# Patient Record
Sex: FEMALE | URBAN - METROPOLITAN AREA
[De-identification: names, ages, dates, MRNs, and addresses within clinical notes are randomized per-mention and may not be internally consistent; named-entity substitution may affect disease eponyms.]

---

## 2021-06-01 ENCOUNTER — LAB REQUISITION (OUTPATIENT)
Dept: LAB | Age: 86
End: 2021-06-01

## 2021-06-01 DIAGNOSIS — L08.0 PYODERMA: ICD-10-CM

## 2021-06-01 PROCEDURE — 87077 CULTURE AEROBIC IDENTIFY: CPT | Performed by: CLINICAL MEDICAL LABORATORY

## 2021-06-01 PROCEDURE — 87205 SMEAR GRAM STAIN: CPT | Performed by: CLINICAL MEDICAL LABORATORY

## 2021-06-01 PROCEDURE — 87070 CULTURE OTHR SPECIMN AEROBIC: CPT | Performed by: CLINICAL MEDICAL LABORATORY

## 2021-06-01 PROCEDURE — 87186 SC STD MICRODIL/AGAR DIL: CPT | Performed by: CLINICAL MEDICAL LABORATORY

## 2021-06-04 LAB
BACTERIA SPEC AEROBE CULT: ABNORMAL
GRAM STN SPEC: ABNORMAL

## 2022-05-22 ENCOUNTER — HOSPITAL ENCOUNTER (EMERGENCY)
Dept: CT IMAGING | Age: 87
Discharge: HOME | End: 2022-05-25
Payer: COMMERCIAL

## 2022-05-22 ENCOUNTER — HOSPITAL ENCOUNTER (EMERGENCY)
Age: 87
Discharge: HOME | End: 2022-05-22
Attending: STUDENT IN AN ORGANIZED HEALTH CARE EDUCATION/TRAINING PROGRAM
Payer: COMMERCIAL

## 2022-05-22 VITALS
DIASTOLIC BLOOD PRESSURE: 90 MMHG | TEMPERATURE: 98.4 F | SYSTOLIC BLOOD PRESSURE: 162 MMHG | RESPIRATION RATE: 16 BRPM | WEIGHT: 126 LBS | HEART RATE: 91 BPM | BODY MASS INDEX: 23.19 KG/M2 | HEIGHT: 62 IN | OXYGEN SATURATION: 99 %

## 2022-05-22 DIAGNOSIS — K52.9 COLITIS: Primary | ICD-10-CM

## 2022-05-22 DIAGNOSIS — N39.0 UTI (URINARY TRACT INFECTION), UNCOMPLICATED: ICD-10-CM

## 2022-05-22 LAB
ALBUMIN SERPL-MCNC: 4 G/DL (ref 3.2–4.6)
ALBUMIN/GLOB SERPL: 1.3 {RATIO} (ref 1.2–3.5)
ALP SERPL-CCNC: 77 U/L (ref 50–130)
ALT SERPL-CCNC: 21 U/L (ref 12–65)
ANION GAP SERPL CALC-SCNC: 9 MMOL/L (ref 7–16)
APPEARANCE UR: CLEAR
AST SERPL-CCNC: 24 U/L (ref 15–37)
BACTERIA URNS QL MICRO: ABNORMAL /HPF
BASOPHILS # BLD: 0 K/UL (ref 0–0.2)
BASOPHILS NFR BLD: 0 % (ref 0–2)
BILIRUB SERPL-MCNC: 0.6 MG/DL (ref 0.2–1.1)
BILIRUB UR QL: NEGATIVE
BUN SERPL-MCNC: 14 MG/DL (ref 8–23)
CALCIUM SERPL-MCNC: 9.4 MG/DL (ref 8.3–10.4)
CASTS URNS QL MICRO: ABNORMAL /LPF
CHLORIDE SERPL-SCNC: 103 MMOL/L (ref 98–107)
CO2 SERPL-SCNC: 28 MMOL/L (ref 21–32)
COLOR UR: YELLOW
CREAT SERPL-MCNC: 0.78 MG/DL (ref 0.6–1)
CRYSTALS URNS QL MICRO: 0 /LPF
DIFFERENTIAL METHOD BLD: ABNORMAL
EOSINOPHIL # BLD: 0 K/UL (ref 0–0.8)
EOSINOPHIL NFR BLD: 0 % (ref 0.5–7.8)
EPI CELLS #/AREA URNS HPF: ABNORMAL /HPF
ERYTHROCYTE [DISTWIDTH] IN BLOOD BY AUTOMATED COUNT: 12.9 % (ref 11.9–14.6)
GLOBULIN SER CALC-MCNC: 3 G/DL (ref 2.3–3.5)
GLUCOSE SERPL-MCNC: 107 MG/DL (ref 65–100)
GLUCOSE UR STRIP.AUTO-MCNC: NEGATIVE MG/DL
HCT VFR BLD AUTO: 39.7 % (ref 35.8–46.3)
HGB BLD-MCNC: 13.4 G/DL (ref 11.7–15.4)
HGB UR QL STRIP: NEGATIVE
IMM GRANULOCYTES # BLD AUTO: 0 K/UL (ref 0–0.5)
IMM GRANULOCYTES NFR BLD AUTO: 0 % (ref 0–5)
KETONES UR QL STRIP.AUTO: 40 MG/DL
LEUKOCYTE ESTERASE UR QL STRIP.AUTO: ABNORMAL
LIPASE SERPL-CCNC: 113 U/L (ref 73–393)
LYMPHOCYTES # BLD: 0.8 K/UL (ref 0.5–4.6)
LYMPHOCYTES NFR BLD: 10 % (ref 13–44)
MAGNESIUM SERPL-MCNC: 1.9 MG/DL (ref 1.8–2.4)
MCH RBC QN AUTO: 31.6 PG (ref 26.1–32.9)
MCHC RBC AUTO-ENTMCNC: 33.8 G/DL (ref 31.4–35)
MCV RBC AUTO: 93.6 FL (ref 79.6–97.8)
MONOCYTES # BLD: 0.3 K/UL (ref 0.1–1.3)
MONOCYTES NFR BLD: 4 % (ref 4–12)
MUCOUS THREADS URNS QL MICRO: 0 /LPF
NEUTS SEG # BLD: 6.4 K/UL (ref 1.7–8.2)
NEUTS SEG NFR BLD: 85 % (ref 43–78)
NITRITE UR QL STRIP.AUTO: NEGATIVE
NRBC # BLD: 0 K/UL (ref 0–0.2)
PH UR STRIP: 7 [PH] (ref 5–9)
PLATELET # BLD AUTO: 186 K/UL (ref 150–450)
PMV BLD AUTO: 9.8 FL (ref 9.4–12.3)
POTASSIUM SERPL-SCNC: 4.3 MMOL/L (ref 3.5–5.1)
PROT SERPL-MCNC: 7 G/DL (ref 6.3–8.2)
PROT UR STRIP-MCNC: NEGATIVE MG/DL
RBC # BLD AUTO: 4.24 M/UL (ref 4.05–5.2)
RBC #/AREA URNS HPF: ABNORMAL /HPF
SODIUM SERPL-SCNC: 140 MMOL/L (ref 136–145)
SP GR UR REFRACTOMETRY: 1.02 (ref 1–1.02)
UROBILINOGEN UR QL STRIP.AUTO: 0.2 EU/DL (ref 0.2–1)
WBC # BLD AUTO: 7.6 K/UL (ref 4.3–11.1)
WBC URNS QL MICRO: ABNORMAL /HPF

## 2022-05-22 PROCEDURE — 85025 COMPLETE CBC W/AUTO DIFF WBC: CPT

## 2022-05-22 PROCEDURE — 2580000003 HC RX 258: Performed by: STUDENT IN AN ORGANIZED HEALTH CARE EDUCATION/TRAINING PROGRAM

## 2022-05-22 PROCEDURE — 81003 URINALYSIS AUTO W/O SCOPE: CPT

## 2022-05-22 PROCEDURE — 6370000000 HC RX 637 (ALT 250 FOR IP): Performed by: STUDENT IN AN ORGANIZED HEALTH CARE EDUCATION/TRAINING PROGRAM

## 2022-05-22 PROCEDURE — 6360000004 HC RX CONTRAST MEDICATION: Performed by: STUDENT IN AN ORGANIZED HEALTH CARE EDUCATION/TRAINING PROGRAM

## 2022-05-22 PROCEDURE — 96375 TX/PRO/DX INJ NEW DRUG ADDON: CPT

## 2022-05-22 PROCEDURE — 83735 ASSAY OF MAGNESIUM: CPT

## 2022-05-22 PROCEDURE — 83690 ASSAY OF LIPASE: CPT

## 2022-05-22 PROCEDURE — 74177 CT ABD & PELVIS W/CONTRAST: CPT

## 2022-05-22 PROCEDURE — 80053 COMPREHEN METABOLIC PANEL: CPT

## 2022-05-22 PROCEDURE — 81001 URINALYSIS AUTO W/SCOPE: CPT

## 2022-05-22 PROCEDURE — 99285 EMERGENCY DEPT VISIT HI MDM: CPT

## 2022-05-22 PROCEDURE — 6360000002 HC RX W HCPCS: Performed by: STUDENT IN AN ORGANIZED HEALTH CARE EDUCATION/TRAINING PROGRAM

## 2022-05-22 PROCEDURE — 96374 THER/PROPH/DIAG INJ IV PUSH: CPT

## 2022-05-22 PROCEDURE — 96361 HYDRATE IV INFUSION ADD-ON: CPT

## 2022-05-22 RX ORDER — ONDANSETRON 2 MG/ML
4 INJECTION INTRAMUSCULAR; INTRAVENOUS
Status: COMPLETED | OUTPATIENT
Start: 2022-05-22 | End: 2022-05-22

## 2022-05-22 RX ORDER — METRONIDAZOLE 500 MG/1
500 TABLET ORAL 3 TIMES DAILY
Qty: 30 TABLET | Refills: 0 | Status: SHIPPED | OUTPATIENT
Start: 2022-05-22 | End: 2022-06-01

## 2022-05-22 RX ORDER — PROMETHAZINE HYDROCHLORIDE 12.5 MG/1
12.5 SUPPOSITORY RECTAL 3 TIMES DAILY PRN
Qty: 8 SUPPOSITORY | Refills: 0 | Status: SHIPPED | OUTPATIENT
Start: 2022-05-22 | End: 2022-09-12

## 2022-05-22 RX ORDER — 0.9 % SODIUM CHLORIDE 0.9 %
500 INTRAVENOUS SOLUTION INTRAVENOUS
Status: COMPLETED | OUTPATIENT
Start: 2022-05-22 | End: 2022-05-22

## 2022-05-22 RX ORDER — METRONIDAZOLE 500 MG/1
500 TABLET ORAL
Status: COMPLETED | OUTPATIENT
Start: 2022-05-22 | End: 2022-05-22

## 2022-05-22 RX ORDER — CEFPODOXIME PROXETIL 200 MG/1
200 TABLET, FILM COATED ORAL 2 TIMES DAILY
Qty: 20 TABLET | Refills: 0 | Status: SHIPPED | OUTPATIENT
Start: 2022-05-22 | End: 2022-06-01

## 2022-05-22 RX ORDER — CEFPODOXIME PROXETIL 200 MG/1
200 TABLET, FILM COATED ORAL ONCE
Status: COMPLETED | OUTPATIENT
Start: 2022-05-22 | End: 2022-05-22

## 2022-05-22 RX ORDER — METOCLOPRAMIDE HYDROCHLORIDE 5 MG/ML
10 INJECTION INTRAMUSCULAR; INTRAVENOUS ONCE
Status: COMPLETED | OUTPATIENT
Start: 2022-05-22 | End: 2022-05-22

## 2022-05-22 RX ADMIN — SODIUM CHLORIDE 500 ML: 900 INJECTION, SOLUTION INTRAVENOUS at 15:11

## 2022-05-22 RX ADMIN — METOCLOPRAMIDE 10 MG: 5 INJECTION, SOLUTION INTRAMUSCULAR; INTRAVENOUS at 16:57

## 2022-05-22 RX ADMIN — ONDANSETRON 4 MG: 2 INJECTION INTRAMUSCULAR; INTRAVENOUS at 15:11

## 2022-05-22 RX ADMIN — METRONIDAZOLE 500 MG: 500 TABLET ORAL at 19:56

## 2022-05-22 RX ADMIN — CEFPODOXIME PROXETIL 200 MG: 200 TABLET, FILM COATED ORAL at 19:56

## 2022-05-22 RX ADMIN — IOPAMIDOL 100 ML: 755 INJECTION, SOLUTION INTRAVENOUS at 18:30

## 2022-05-22 NOTE — ED PROVIDER NOTES
[facility] Emergency Department Provider Note                   PCP:                None Provider               Age: 80 y.o. Sex: female       ICD-10-CM    1. Colitis  K52.9    2. UTI (urinary tract infection), uncomplicated  S34.0        DISPOSITION Decision To Discharge 05/22/2022 07:37:57 PM       New Prescriptions    CEFPODOXIME (VANTIN) 200 MG TABLET    Take 1 tablet by mouth 2 times daily for 10 days    METRONIDAZOLE (FLAGYL) 500 MG TABLET    Take 1 tablet by mouth 3 times daily for 10 days    PROMETHAZINE (PHENERGAN) 12.5 MG SUPPOSITORY    Place 1 suppository rectally 3 times daily as needed for Nausea       Orders Placed This Encounter   Procedures    CT ABDOMEN PELVIS W IV CONTRAST Additional Contrast? None (cannot tolerate any po)    CBC with Auto Differential    Comprehensive Metabolic Panel    Magnesium    Lipase    Urinalysis with Microscopic    Continuous Pulse Oximetry    POCT Urinalysis no Micro        MDM  Number of Diagnoses or Management Options  Colitis  UTI (urinary tract infection), uncomplicated  Diagnosis management comments: 75-year-old female presents the ER with nausea and vomiting. Given Zofran initially, she continued to have vomiting after that. Given Reglan which resolved the symptoms. Patient also given IV fluid. Lab work showed normal white count, stable H&H, normal electrolytes and kidney function, normal liver enzymes and lipase, urine shows urinary tract infection, CT scan with IV contrast shows evidence for colitis. Will treat with Vantin and Flagyl, will give first dose here in the ER. Patient given prescription for Phenergan suppositories since Zofran does not work for her. She was given strict return precautions. Patient and family voiced understanding and agreement with this plan.        Amount and/or Complexity of Data Reviewed  Clinical lab tests: ordered and reviewed  Tests in the radiology section of CPT®: reviewed    Risk of Complications, Morbidity, and/or Mortality  Presenting problems: moderate  Diagnostic procedures: moderate  Management options: moderate         Varghese Byrne is a 80 y.o. female who presents to the Emergency Department with chief complaint of    Chief Complaint   Patient presents with    Emesis      80-year-old female presents to the emergency department with daughter at bedside. Daughter is concerned because for the past 3 days patient has had nausea with vomiting with decreased p.o. intake. Patient reports her stomach has felt unwell but denies any significant pain. Patient does have some dementia which contributes to poor history from patient. Patient also has had some loose stool and states her stomach is felt unwell. Denies any significant abdominal pain. Does report some dysuria. Review of Systems   Constitutional: Negative for chills and fever. HENT: Negative for sore throat. Eyes: Negative for photophobia. Respiratory: Negative for cough and shortness of breath. Cardiovascular: Negative for chest pain. Gastrointestinal: Positive for nausea and vomiting. Negative for diarrhea. Genitourinary: Positive for dysuria. Musculoskeletal: Negative for neck pain and neck stiffness. Skin: Negative for rash. Neurological: Negative for syncope and headaches. Psychiatric/Behavioral: Negative for confusion. All other systems reviewed and are negative. All other systems reviewed and are negative. No past medical history on file. No past surgical history on file. No family history on file.         Social Connections:     Frequency of Communication with Friends and Family: Not on file    Frequency of Social Gatherings with Friends and Family: Not on file    Attends Taoist Services: Not on file    Active Member of Clubs or Organizations: Not on file    Attends Club or Organization Meetings: Not on file    Marital Status: Not on file        No Known Allergies     Vitals signs and nursing note reviewed. Patient Vitals for the past 4 hrs:   Pulse Resp BP SpO2   05/22/22 1800 79 18 (!) 159/77 96 %   05/22/22 1730 79 -- (!) 154/81 95 %   05/22/22 1645 73 16 (!) 166/80 95 %   05/22/22 1600 78 18 (!) 174/93 97 %          Physical Exam  Vitals and nursing note reviewed. Constitutional:       General: She is not in acute distress. Appearance: Normal appearance. HENT:      Head: Normocephalic. Nose: Nose normal.      Mouth/Throat:      Mouth: Mucous membranes are dry. Eyes:      Extraocular Movements: Extraocular movements intact. Cardiovascular:      Rate and Rhythm: Normal rate and regular rhythm. Pulses: Normal pulses. Heart sounds: Normal heart sounds. Pulmonary:      Effort: Pulmonary effort is normal. No respiratory distress. Breath sounds: Normal breath sounds. Abdominal:      General: Abdomen is flat. Palpations: Abdomen is soft. Tenderness: There is no abdominal tenderness. There is no guarding or rebound. Comments: No significant tenderness to palpation, patient does report she is feels uneasy throughout her abdomen   Musculoskeletal:         General: No swelling or tenderness. Normal range of motion. Cervical back: Normal range of motion. No rigidity. Skin:     General: Skin is warm. Findings: No rash. Neurological:      General: No focal deficit present. Mental Status: She is alert and oriented to person, place, and time.    Psychiatric:         Mood and Affect: Mood normal.          Procedures    Labs Reviewed   CBC WITH AUTO DIFFERENTIAL - Abnormal; Notable for the following components:       Result Value    Seg Neutrophils 85 (*)     Lymphocytes 10 (*)     Eosinophils % 0 (*)     All other components within normal limits   COMPREHENSIVE METABOLIC PANEL - Abnormal; Notable for the following components:    Glucose 107 (*)     All other components within normal limits   URINALYSIS WITH MICROSCOPIC - Abnormal; Notable for the following components:    Ketones, Urine 40 (*)     Leukocyte Esterase, Urine SMALL (*)     All other components within normal limits   MAGNESIUM   LIPASE   POCT URINALYSIS DIPSTICK        CT ABDOMEN PELVIS W IV CONTRAST Additional Contrast? None (cannot tolerate any po)   Final Result   1. Mesenteric edema about the colon suggesting colitis. This report was made using voice transcription. Despite my best efforts to avoid   any, transcription errors may persist. If there is any question about the   accuracy of the report or need for clarification, then please call [phone], or text me through [employer] for clarification or correction. [place] Coma Scale  Eye Opening: Spontaneous  Best Verbal Response: Oriented  Best Motor Response: Obeys commands  [place] Coma Scale Score: 15                     Voice dictation software was used during the making of this note. This software is not perfect and grammatical and other typographical errors may be present. This note has not been completely proofread for errors.         Jesus Avelar,   05/23/22 0025 none

## 2022-05-22 NOTE — ED TRIAGE NOTES
Pt ambulatory to triage with her walker. Pt states that for the past week her stomach has been upset, then two days ago pt was eating dinner and she threw up. Pt has then had several more episodes of vomiting since then. Pt denies abd pain, pt denies dysuria, denies diarrhea, denies fever. Pt was just seen at urgent care prior to coming here. They did a chest xray that was unremarkable. Pt states she feels like she may be coughing up mucus, the last time pt ate was yesterday at dinner.

## 2022-05-23 ASSESSMENT — ENCOUNTER SYMPTOMS
DIARRHEA: 0
VOMITING: 1
SHORTNESS OF BREATH: 0
PHOTOPHOBIA: 0
SORE THROAT: 0
COUGH: 0
NAUSEA: 1

## 2022-05-23 NOTE — ED NOTES
I have reviewed discharge instructions with the patient and daughter. The patient and daughter verbalized understanding. Patient left ED via Discharge Method: ambulatory to Home with daughter    Opportunity for questions and clarification provided. No acute distress present      Patient given 3 scripts. To continue your aftercare when you leave the hospital, you may receive an automated call from our care team to check in on how you are doing. This is a free service and part of our promise to provide the best care and service to meet your aftercare needs.  If you have questions, or wish to unsubscribe from this service please call [phone]. Thank you for Choosing our [facility] Emergency Department.       Regan Schaffer RN  05/22/22 2003

## 2022-09-10 SDOH — HEALTH STABILITY: PHYSICAL HEALTH: ON AVERAGE, HOW MANY DAYS PER WEEK DO YOU ENGAGE IN MODERATE TO STRENUOUS EXERCISE (LIKE A BRISK WALK)?: 0 DAYS

## 2022-09-10 ASSESSMENT — SOCIAL DETERMINANTS OF HEALTH (SDOH)
WITHIN THE LAST YEAR, HAVE YOU BEEN AFRAID OF YOUR PARTNER OR EX-PARTNER?: NO
WITHIN THE LAST YEAR, HAVE YOU BEEN KICKED, HIT, SLAPPED, OR OTHERWISE PHYSICALLY HURT BY YOUR PARTNER OR EX-PARTNER?: NO
WITHIN THE LAST YEAR, HAVE YOU BEEN HUMILIATED OR EMOTIONALLY ABUSED IN OTHER WAYS BY YOUR PARTNER OR EX-PARTNER?: NO
WITHIN THE LAST YEAR, HAVE TO BEEN RAPED OR FORCED TO HAVE ANY KIND OF SEXUAL ACTIVITY BY YOUR PARTNER OR EX-PARTNER?: NO

## 2022-09-12 ENCOUNTER — OFFICE VISIT (OUTPATIENT)
Dept: FAMILY MEDICINE CLINIC | Facility: CLINIC | Age: 87
End: 2022-09-12
Payer: COMMERCIAL

## 2022-09-12 VITALS
WEIGHT: 126.2 LBS | BODY MASS INDEX: 23.22 KG/M2 | HEART RATE: 65 BPM | SYSTOLIC BLOOD PRESSURE: 132 MMHG | RESPIRATION RATE: 18 BRPM | HEIGHT: 62 IN | OXYGEN SATURATION: 97 % | DIASTOLIC BLOOD PRESSURE: 68 MMHG

## 2022-09-12 DIAGNOSIS — M19.90 OSTEOARTHRITIS, UNSPECIFIED OSTEOARTHRITIS TYPE, UNSPECIFIED SITE: ICD-10-CM

## 2022-09-12 DIAGNOSIS — J30.9 ALLERGIC RHINITIS, UNSPECIFIED SEASONALITY, UNSPECIFIED TRIGGER: ICD-10-CM

## 2022-09-12 DIAGNOSIS — K21.9 GASTROESOPHAGEAL REFLUX DISEASE WITHOUT ESOPHAGITIS: ICD-10-CM

## 2022-09-12 DIAGNOSIS — I10 PRIMARY HYPERTENSION: Primary | ICD-10-CM

## 2022-09-12 DIAGNOSIS — Z00.00 LABORATORY EXAM ORDERED AS PART OF ROUTINE GENERAL MEDICAL EXAMINATION: ICD-10-CM

## 2022-09-12 DIAGNOSIS — I25.10 CORONARY ARTERY DISEASE DUE TO LIPID RICH PLAQUE: ICD-10-CM

## 2022-09-12 DIAGNOSIS — K58.0 IRRITABLE BOWEL SYNDROME WITH DIARRHEA: ICD-10-CM

## 2022-09-12 DIAGNOSIS — B00.1 COLD SORE: ICD-10-CM

## 2022-09-12 DIAGNOSIS — I25.83 CORONARY ARTERY DISEASE DUE TO LIPID RICH PLAQUE: ICD-10-CM

## 2022-09-12 DIAGNOSIS — E78.5 HYPERLIPIDEMIA, UNSPECIFIED HYPERLIPIDEMIA TYPE: ICD-10-CM

## 2022-09-12 DIAGNOSIS — E55.9 VITAMIN D DEFICIENCY: ICD-10-CM

## 2022-09-12 DIAGNOSIS — H91.93 BILATERAL HEARING LOSS, UNSPECIFIED HEARING LOSS TYPE: ICD-10-CM

## 2022-09-12 DIAGNOSIS — Z23 ENCOUNTER FOR IMMUNIZATION: ICD-10-CM

## 2022-09-12 PROBLEM — H91.90 HEARING LOSS: Status: ACTIVE | Noted: 2022-09-12

## 2022-09-12 PROBLEM — K58.9 IRRITABLE BOWEL SYNDROME: Status: ACTIVE | Noted: 2022-09-12

## 2022-09-12 LAB
BASOPHILS # BLD: 0.1 K/UL (ref 0–0.2)
BASOPHILS NFR BLD: 1 % (ref 0–2)
DIFFERENTIAL METHOD BLD: ABNORMAL
EOSINOPHIL # BLD: 0.1 K/UL (ref 0–0.8)
EOSINOPHIL NFR BLD: 2 % (ref 0.5–7.8)
ERYTHROCYTE [DISTWIDTH] IN BLOOD BY AUTOMATED COUNT: 12.6 % (ref 11.9–14.6)
HCT VFR BLD AUTO: 38.4 % (ref 35.8–46.3)
HGB BLD-MCNC: 12.3 G/DL (ref 11.7–15.4)
IMM GRANULOCYTES # BLD AUTO: 0 K/UL (ref 0–0.5)
IMM GRANULOCYTES NFR BLD AUTO: 0 % (ref 0–5)
LYMPHOCYTES # BLD: 1.7 K/UL (ref 0.5–4.6)
LYMPHOCYTES NFR BLD: 28 % (ref 13–44)
MCH RBC QN AUTO: 31.5 PG (ref 26.1–32.9)
MCHC RBC AUTO-ENTMCNC: 32 G/DL (ref 31.4–35)
MCV RBC AUTO: 98.2 FL (ref 79.6–97.8)
MONOCYTES # BLD: 0.7 K/UL (ref 0.1–1.3)
MONOCYTES NFR BLD: 11 % (ref 4–12)
NEUTS SEG # BLD: 3.6 K/UL (ref 1.7–8.2)
NEUTS SEG NFR BLD: 58 % (ref 43–78)
NRBC # BLD: 0 K/UL (ref 0–0.2)
PLATELET # BLD AUTO: 241 K/UL (ref 150–450)
PMV BLD AUTO: 10.8 FL (ref 9.4–12.3)
RBC # BLD AUTO: 3.91 M/UL (ref 4.05–5.2)
WBC # BLD AUTO: 6.3 K/UL (ref 4.3–11.1)

## 2022-09-12 PROCEDURE — 1123F ACP DISCUSS/DSCN MKR DOCD: CPT | Performed by: NURSE PRACTITIONER

## 2022-09-12 PROCEDURE — G8420 CALC BMI NORM PARAMETERS: HCPCS | Performed by: NURSE PRACTITIONER

## 2022-09-12 PROCEDURE — G8427 DOCREV CUR MEDS BY ELIG CLIN: HCPCS | Performed by: NURSE PRACTITIONER

## 2022-09-12 PROCEDURE — 1090F PRES/ABSN URINE INCON ASSESS: CPT | Performed by: NURSE PRACTITIONER

## 2022-09-12 PROCEDURE — 1036F TOBACCO NON-USER: CPT | Performed by: NURSE PRACTITIONER

## 2022-09-12 PROCEDURE — 99203 OFFICE O/P NEW LOW 30 MIN: CPT | Performed by: NURSE PRACTITIONER

## 2022-09-12 RX ORDER — SIMVASTATIN 40 MG
TABLET ORAL
COMMUNITY
Start: 2022-08-17

## 2022-09-12 RX ORDER — LISINOPRIL 10 MG/1
TABLET ORAL
Qty: 90 TABLET | Refills: 1 | Status: SHIPPED | OUTPATIENT
Start: 2022-09-12 | End: 2022-10-25 | Stop reason: SINTOL

## 2022-09-12 RX ORDER — NAPROXEN 500 MG/1
500 TABLET ORAL PRN
COMMUNITY

## 2022-09-12 RX ORDER — M-VIT,TX,IRON,MINS/CALC/FOLIC 27MG-0.4MG
1 TABLET ORAL DAILY
COMMUNITY

## 2022-09-12 RX ORDER — ACETAMINOPHEN 160 MG
TABLET,DISINTEGRATING ORAL
COMMUNITY
End: 2022-09-14 | Stop reason: DRUGHIGH

## 2022-09-12 RX ORDER — LISINOPRIL 10 MG/1
TABLET ORAL
COMMUNITY
Start: 2022-06-12 | End: 2022-09-12 | Stop reason: SDUPTHER

## 2022-09-12 RX ORDER — VALACYCLOVIR HYDROCHLORIDE 1 G/1
TABLET, FILM COATED ORAL
COMMUNITY
Start: 2022-06-12

## 2022-09-12 RX ORDER — DOXYCYCLINE HYCLATE 100 MG
100 TABLET ORAL 2 TIMES DAILY
COMMUNITY
End: 2022-09-12

## 2022-09-12 RX ORDER — CETIRIZINE HYDROCHLORIDE 10 MG/1
10 TABLET ORAL PRN
COMMUNITY

## 2022-09-12 RX ORDER — CHOLESTYRAMINE 4 G/9G
POWDER, FOR SUSPENSION ORAL
COMMUNITY
Start: 2022-07-06 | End: 2022-10-03 | Stop reason: SDUPTHER

## 2022-09-12 SDOH — HEALTH STABILITY: PHYSICAL HEALTH: ON AVERAGE, HOW MANY DAYS PER WEEK DO YOU ENGAGE IN MODERATE TO STRENUOUS EXERCISE (LIKE A BRISK WALK)?: 0 DAYS

## 2022-09-12 SDOH — HEALTH STABILITY: PHYSICAL HEALTH: ON AVERAGE, HOW MANY MINUTES DO YOU ENGAGE IN EXERCISE AT THIS LEVEL?: 0 MIN

## 2022-09-12 ASSESSMENT — ENCOUNTER SYMPTOMS
SORE THROAT: 0
SHORTNESS OF BREATH: 0
VOMITING: 0
NAUSEA: 0
SINUS PRESSURE: 0
APNEA: 0
BLOOD IN STOOL: 0
RECTAL PAIN: 0
RHINORRHEA: 0
RESPIRATORY NEGATIVE: 1
ABDOMINAL PAIN: 0
DIARRHEA: 1
STRIDOR: 0
COLOR CHANGE: 0
CHOKING: 0
CONSTIPATION: 0
SINUS PAIN: 0
VOICE CHANGE: 0
TROUBLE SWALLOWING: 0
BACK PAIN: 0
CHEST TIGHTNESS: 0
ANAL BLEEDING: 0
ABDOMINAL DISTENTION: 0
COUGH: 0
WHEEZING: 0
EYE PAIN: 0
EYE DISCHARGE: 0
EYES NEGATIVE: 1
FACIAL SWELLING: 0

## 2022-09-12 ASSESSMENT — PATIENT HEALTH QUESTIONNAIRE - PHQ9
SUM OF ALL RESPONSES TO PHQ QUESTIONS 1-9: 0
SUM OF ALL RESPONSES TO PHQ9 QUESTIONS 1 & 2: 0
7. TROUBLE CONCENTRATING ON THINGS, SUCH AS READING THE NEWSPAPER OR WATCHING TELEVISION: 0
6. FEELING BAD ABOUT YOURSELF - OR THAT YOU ARE A FAILURE OR HAVE LET YOURSELF OR YOUR FAMILY DOWN: 0
SUM OF ALL RESPONSES TO PHQ QUESTIONS 1-9: 0
SUM OF ALL RESPONSES TO PHQ QUESTIONS 1-9: 0
1. LITTLE INTEREST OR PLEASURE IN DOING THINGS: 0
10. IF YOU CHECKED OFF ANY PROBLEMS, HOW DIFFICULT HAVE THESE PROBLEMS MADE IT FOR YOU TO DO YOUR WORK, TAKE CARE OF THINGS AT HOME, OR GET ALONG WITH OTHER PEOPLE: 0
2. FEELING DOWN, DEPRESSED OR HOPELESS: 0
3. TROUBLE FALLING OR STAYING ASLEEP: 0
8. MOVING OR SPEAKING SO SLOWLY THAT OTHER PEOPLE COULD HAVE NOTICED. OR THE OPPOSITE, BEING SO FIGETY OR RESTLESS THAT YOU HAVE BEEN MOVING AROUND A LOT MORE THAN USUAL: 0
4. FEELING TIRED OR HAVING LITTLE ENERGY: 0
5. POOR APPETITE OR OVEREATING: 0
9. THOUGHTS THAT YOU WOULD BE BETTER OFF DEAD, OR OF HURTING YOURSELF: 0
SUM OF ALL RESPONSES TO PHQ QUESTIONS 1-9: 0

## 2022-09-12 ASSESSMENT — ANXIETY QUESTIONNAIRES
7. FEELING AFRAID AS IF SOMETHING AWFUL MIGHT HAPPEN: 0
2. NOT BEING ABLE TO STOP OR CONTROL WORRYING: 0
IF YOU CHECKED OFF ANY PROBLEMS ON THIS QUESTIONNAIRE, HOW DIFFICULT HAVE THESE PROBLEMS MADE IT FOR YOU TO DO YOUR WORK, TAKE CARE OF THINGS AT HOME, OR GET ALONG WITH OTHER PEOPLE: NOT DIFFICULT AT ALL
GAD7 TOTAL SCORE: 2
1. FEELING NERVOUS, ANXIOUS, OR ON EDGE: 0
6. BECOMING EASILY ANNOYED OR IRRITABLE: 1
3. WORRYING TOO MUCH ABOUT DIFFERENT THINGS: 1
5. BEING SO RESTLESS THAT IT IS HARD TO SIT STILL: 0
4. TROUBLE RELAXING: 0

## 2022-09-12 ASSESSMENT — LIFESTYLE VARIABLES
HOW OFTEN DO YOU HAVE A DRINK CONTAINING ALCOHOL: NEVER
HOW MANY STANDARD DRINKS CONTAINING ALCOHOL DO YOU HAVE ON A TYPICAL DAY: PATIENT DOES NOT DRINK

## 2022-09-12 NOTE — PROGRESS NOTES
[address]  Phone: [phone] Fax [phone]  Marcella Mary. Niyah MS, APRN, FNP-C  9/12/2022   Chief Complaint   Patient presents with    New Patient     Pt here today to [facility] care. Reports moving to area from [facility] last year. Most recently seen by PA at the [facility] earlier this year. Lives with her . Daughter checks on pt and her  frequently. Other     Pt reports having a hx of multiple chronic conditions and takes several medications. Med rec completed with pt's daughter who had pt's medication list. Pt requesting referrals to local Cardiologist and GI. Pt needs refill on Lisinopril. Denies needing refill on any other medications. Please see ROS/Assessment and Plan section for full details of all items addressed during today's assessment. ASSESSMENT/PLAN:  Below is the assessment and plan developed based on review of pertinent history, physical exam, labs, studies, and medications. 1. Primary hypertension  Pt reports taking Lisinopril 10 mg po daily and reports following heart healthy/DASH diet. Pt reports BP well controlled when she is checking it. BP WNL today 132/68 (goal <140/90). Discussed with pt. Will continue current dose of Lisinopril-refill given per pt request and continue heart healthy/DASH diet. Pt to f/u with me in 6 weeks. Will monitor.   - Comprehensive Metabolic Panel; Future  - lisinopril (PRINIVIL;ZESTRIL) 10 MG tablet; TAKE 1 TABLET BY MOUTH DAILY FOR 90 DAYS  Dispense: 90 tablet; Refill: 1  - 05699 - Venipuncture    2. Hyperlipidemia, unspecified hyperlipidemia type  Pt reports having hx of HLD. Reports taking Simvastatin 40 mg po daily and reports following heart healthy/DASH diet. Discussed with pt. Will have pt continue current dose of Simvastatin for now-denies needing refill and continue heart healthy/DASH diet. Will check fasting lipids prior to f/u with me in 6 weeks. Will adjust dose of Simvastatin if needed based on result if needed. Will monitor.   - Lipid Panel; Future  - 74383 - Venipuncture    3. Coronary artery disease due to lipid rich plaque  Pt reports having hx of CAD-hx of cardiac stents in past per pt. Per pt was seeing Cardiology in [place]. Would like referral to local Cardiologist to monitor (requests Dr. Noe Epperson at [facility]). Pt denies any recent or current chest pain or pressure or SOB. Discussed with pt. Will refer pt to Dr. Noe Epperson per her request. Pt to f/u with me in 6 weeks. Pt agrees to go to ER or call 9-1-1 for any symptoms of MI as discussed. Will monitor.   - Jeanette Cornelius MD, Cardiology, [place]    4. Irritable bowel syndrome with diarrhea  5. Gastroesophageal reflux disease without esophagitis  Pt reports GERD/IBS controlled at this time with PRN OTC Tums, current dose of Cholestyramine, and GERD/IBS diet. Pt reports having periods when GERD/IBS seems to worsen, but over past few weeks has been well controlled. Pt was treated for Colitis in May 2022 with abx (seen on CT in ER May 2022). Denies any current symptoms of Colitis. Discussed with pt. Will refer pt to GI for further evaluation and treatment of her GERD/IBS due to recent hx of Colitis. Will havd pt continue GERD/IBS diet, current dose of PRN OTC Tums, and current dose of Cholestyramine-denies needing refill. May consider trying pt on Omeprazole for GERD and PRN Bentyl for IBS-D should her GERD and and IBS-D symptoms worsen again. Will check CBC/CMP today. Pt to f/u with me in 6 weeks. Will monitor.   - CBC with Auto Differential; Future  - Comprehensive Metabolic Panel; Future  - Marcus Guillen MD, Gastroenterology, [place]    6. Osteoarthritis, unspecified osteoarthritis type, unspecified site  Pt reports having OA pain to neck, back, hands-relieved by occasional PRN OTC Aleve. Uses rolling walker due to OA pain. Discussed with pt.  Will have pt take PRN OTC Aleve sparingly due to hx of GERD. Recommend she try PRN OTC Tylenol Arthritis as directed in stead. Pt to f/u with me in 6 weeks. Will monitor. 7. Cold sore  Pt reports having a hx of cold sores to her mouth. Takes PRN Valtrex with relief. Denies having any recent cold sores. Discussed with pt. Will have pt continue PRN Valtrex-denies needing refill. Pt to f/u with me in 6 weeks. Will monitor. 8. Vitamin D deficiency  Pt reports having hx of Vitamin D deficiency. Reports taking Vitamin D 2000 units po daily. Discussed with pt. Will have pt continue current dose of Vitamin D for now-denies needing refill. Will check Vitamin D level today. Will adjust dose of Vitamin D if needed based on result if needed. Pt to f/u with me in 6 weeks. Will monitor.   - Vitamin D 25 Hydroxy; Future  - 26905 - Venipuncture    9. Bilateral hearing loss, unspecified hearing loss type  Pt has bilat hearing loss-bilat hearing aids. 10. Laboratory exam ordered as part of routine general medical examination  Following baseline labs drawn today except for fasting lipids. Pt to have fasting lipids drawn prior to f/u with me in 6 weeks. Will discuss results with pt at next appointment. - CBC with Auto Differential; Future  - Comprehensive Metabolic Panel; Future  - Lipid Panel; Future  - TSH; Future  - T4, Free; Future  - Hepatitis C Antibody; Future    11. Encounter for immunization  Pt declined Covid, Flu, Tdap, Shingrix, Pneumonia vaccines. 12. Allergic rhinitis, unspecified seasonality, unspecified trigger  Pt reports allergic rhinitis well controlled on PRN Zyrtec. Denies any current symptoms. Discussed with pt. Will have pt continue current dose of PRN Zyrtec-denies needing refill at this time. Pt to f/u with me in 6 weeks. Will monitor. Return in about 6 weeks (around 10/24/2022) for Lab review/recheck GI issues/chronic conditions. Call sooner for concerns.  ER for severe symptoms. SUBJECTIVE/OBJECTIVE:    JULIO-  Pk Tapia (: 1931) is a 80 y.o. female, New patient patient, here for evaluation of the following chief complaint(s):  New Patient (Pt here today [facility]. Reports moving to area from [place] last year. Most recently seen by PA at the [facility] earlier this year. Lives with her . Daughter checks on pt and her  frequently. ) and Other (Pt reports having a hx of multiple chronic conditions and takes several medications. Med rec completed with pt's daughter who had pt's medication list. Pt requesting referrals to local Cardiologist and GI. Pt needs refill on Lisinopril. Denies needing refill on any other medications. Please see ROS/Assessment and Plan section for full details of all items addressed during today's assessment. /)     Allergies   Allergen Reactions    Seasonal      [unfilled]  Past Medical History:   Diagnosis Date    CAD (coronary artery disease)     Had 4 stents    GERD (gastroesophageal reflux disease)     Has been in ER about 2 mo ago for issues Has issues with dry coughing, vomiting,and bad bowels issues    Hearing loss     Been wearing aids for a long time    Hyperlipidemia     Irritable bowel syndrome     Never professionally diagnosed but thinks she has it    Osteoarthritis Many years    Has it in neck, back and hands    Primary hypertension      Past Surgical History:   Procedure Laterality Date    CARDIAC SURGERY      4 stents    COSMETIC SURGERY  Many    Eyes, and 3 face lifts    HYSTERECTOMY, TOTAL ABDOMINAL (CERVIX REMOVED)      JOINT REPLACEMENT Bilateral 20-30 yrs ago    Knees     Family History   Problem Relation Age of Onset    Alcohol Abuse Father     Alcohol Abuse Brother     Alcohol Abuse Sister     Alcohol Abuse Brother      Social History     Tobacco Use   Smoking Status Never   Smokeless Tobacco Never         Review of Systems   Constitutional: Negative.   Negative for appetite change, chills, diaphoresis, fatigue, fever and unexpected weight change. HENT:  Positive for hearing loss (has bilat hearing aids). Negative for congestion, dental problem, drooling, ear discharge, ear pain, facial swelling, mouth sores, nosebleeds, postnasal drip, rhinorrhea, sinus pressure, sinus pain, sneezing, sore throat, tinnitus, trouble swallowing and voice change. Allergic rhinitis well controlled on PRN Zyrtec. Denies any current symptoms   Eyes: Negative. Negative for pain, discharge and visual disturbance. Respiratory: Negative. Negative for apnea, cough, choking, chest tightness, shortness of breath, wheezing and stridor. Cardiovascular: Negative. Negative for chest pain, palpitations and leg swelling. Gastrointestinal:  Positive for diarrhea (IBS-D controlled with current dose of Cholesteryramine and IBS diet per pt). Negative for abdominal distention, abdominal pain, anal bleeding, blood in stool, constipation, nausea, rectal pain and vomiting. GERD controlled at present with PRN OTC Tums/GERD diet per pt    Endocrine: Negative. Negative for cold intolerance, heat intolerance, polydipsia, polyphagia and polyuria. Genitourinary: Negative. Negative for decreased urine volume, difficulty urinating, dyspareunia, dysuria, flank pain, frequency, genital sores, hematuria, menstrual problem, pelvic pain, urgency, vaginal bleeding, vaginal discharge and vaginal pain. Musculoskeletal:  Positive for arthralgias (OA pain to neck, back, hands-relieved by occasional PRN OTC Aleve; uses rolling walker due to OA pain) and gait problem (uses rolling walker due to OA pain). Negative for back pain, joint swelling, myalgias, neck pain and neck stiffness. Skin:  Negative for color change, pallor, rash and wound. Pt reports having a hx of cold sores to her mouth. None recent.  Takes PRN Valtrex with relief   Allergic/Immunologic: Positive for environmental allergies (Allergic rhinitis well controlled on PRN Zyrtec. Denies any current symptoms). Neurological:  Negative for dizziness, tremors, seizures, syncope, facial asymmetry, speech difficulty, weakness, light-headedness, numbness and headaches. Hematological: Negative. Negative for adenopathy. Does not bruise/bleed easily. Psychiatric/Behavioral: Negative. Negative for agitation, behavioral problems, confusion, decreased concentration, dysphoric mood, hallucinations, self-injury, sleep disturbance and suicidal ideas. The patient is not nervous/anxious and is not hyperactive. Vitals:    09/12/22 1442   BP: 132/68   Pulse: 65   Resp: 18   SpO2: 97%       Physical Exam  Vitals reviewed. Constitutional:       General: She is not in acute distress. Appearance: Normal appearance. She is normal weight. She is not ill-appearing, toxic-appearing or diaphoretic. HENT:      Head: Normocephalic and atraumatic. Right Ear: Tympanic membrane, ear canal and external ear normal. There is no impacted cerumen. Left Ear: Tympanic membrane, ear canal and external ear normal. There is no impacted cerumen. Ears:      Comments: Bilat hearing aids removed from ear exam and then replaced     Nose: Nose normal. No congestion or rhinorrhea. Mouth/Throat:      Mouth: Mucous membranes are moist.      Pharynx: Oropharynx is clear. No oropharyngeal exudate or posterior oropharyngeal erythema. Comments: No cold sores noted to mouth at present  Eyes:      General: No scleral icterus. Right eye: No discharge. Left eye: No discharge. Extraocular Movements: Extraocular movements intact. Conjunctiva/sclera: Conjunctivae normal.      Pupils: Pupils are equal, round, and reactive to light. Cardiovascular:      Rate and Rhythm: Normal rate and regular rhythm. Pulses: Normal pulses. Heart sounds: Normal heart sounds. No murmur heard. No friction rub. No gallop.    Pulmonary: Effort: Pulmonary effort is normal. No respiratory distress. Breath sounds: Normal breath sounds. No stridor. No wheezing, rhonchi or rales. Chest:      Chest wall: No tenderness. Abdominal:      General: Abdomen is flat. Bowel sounds are normal. There is no distension. Palpations: Abdomen is soft. There is no mass. Tenderness: There is no abdominal tenderness. There is no right CVA tenderness, left CVA tenderness, guarding or rebound. Hernia: No hernia is present. Musculoskeletal:         General: No swelling, tenderness, deformity or signs of injury. Normal range of motion. Cervical back: Normal range of motion and neck supple. No rigidity or tenderness. Right lower leg: No edema. Left lower leg: No edema. Comments: gait slow but steady and assisted by rolling walker   Lymphadenopathy:      Cervical: No cervical adenopathy. Skin:     General: Skin is warm. Coloration: Skin is not jaundiced or pale. Findings: No bruising, erythema, lesion or rash. Neurological:      General: No focal deficit present. Mental Status: She is alert and oriented to person, place, and time. Cranial Nerves: No cranial nerve deficit. Sensory: No sensory deficit. Motor: No weakness. Coordination: Coordination normal.      Gait: Gait (gait slow but steady and assisted by rolling walker) normal.   Psychiatric:         Mood and Affect: Mood normal.         Behavior: Behavior normal.         Thought Content:  Thought content normal.         Judgment: Judgment normal.   Component      Latest Ref Rng & Units 5/22/2022 5/22/2022 5/22/2022           2:04 PM  2:04 PM  2:04 PM   WBC      4.3 - 11.1 K/uL   7.6   RBC      4.05 - 5.2 M/uL   4.24   Hemoglobin Quant      11.7 - 15.4 g/dL   13.4   Hematocrit      35.8 - 46.3 %   39.7   MCV      79.6 - 97.8 FL   93.6   MCH      26.1 - 32.9 PG   31.6   MCHC      31.4 - 35.0 g/dL   33.8   RDW      11.9 - 14.6 %   12.9 Platelet Count      693 - 450 K/uL   186   MPV      9.4 - 12.3 FL   9.8   Nucleated Red Blood Cells      0.0 - 0.2 K/uL   0.00   Differential Type         AUTOMATED   Seg Neutrophils      43 - 78 %   85 (H)   Lymphocytes      13 - 44 %   10 (L)   Monocytes      4.0 - 12.0 %   4   Eosinophils %      0.5 - 7.8 %   0 (L)   Basophils      0.0 - 2.0 %   0   Immature Granulocytes      0.0 - 5.0 %   0   Segs Absolute      1.7 - 8.2 K/UL   6.4   Absolute Lymph #      0.5 - 4.6 K/UL   0.8   Absolute Mono #      0.1 - 1.3 K/UL   0.3   Absolute Eos #      0.0 - 0.8 K/UL   0.0   Basophils Absolute      0.0 - 0.2 K/UL   0.0   Absolute Immature Granulocyte      0.0 - 0.5 K/UL   0.0   Sodium      136 - 145 mmol/L  140    Potassium      3.5 - 5.1 mmol/L  4.3    Chloride      98 - 107 mmol/L  103    CO2      21 - 32 mmol/L  28    Anion Gap      7 - 16 mmol/L  9    Glucose, Random      65 - 100 mg/dL  107 (H)    BUN,BUNPL      8 - 23 MG/DL  14    Creatinine      0.6 - 1.0 MG/DL  0.78    GFR African American      >60 ml/min/1.73m2  >60    GFR Non-African American      >60 ml/min/1.73m2  >60    CALCIUM, SERUM, 495830      8.3 - 10.4 MG/DL  9.4    Bilirubin      0.2 - 1.1 MG/DL  0.6    ALT      12 - 65 U/L  21    AST      15 - 37 U/L  24    Alk Phosphatase      50 - 130 U/L  77    Total Protein      6.3 - 8.2 g/dL  7.0    Albumin      3.2 - 4.6 g/dL  4.0    Globulin      2.3 - 3.5 g/dL  3.0    ALBUMIN/GLOBULIN RATIO      1.2 - 3.5    1.3    Lipase      73 - 393 U/L 113       CT ABDOMEN AND PELVIS WITH INTRAVENOUS CONTRAST DATED 5/22/2022. History: Abdominal discomfort for one week. Vomiting with food for 2 days. Comparison: None. Technique:   Multiple contiguous helical CT images reconstructed at 5 mm   intervals were obtained from above the diaphragms through the ischial   tuberosities following 100 cc Isovue-370 without acute complication.   All CT   scans performed at this facility use one or all of the following: Automated   exposure control, adjustment of the mA and/or kVp according to patient's size,   iterative reconstruction. Findings:   CT ABDOMEN:     Limited evaluation of the lung bases and base of the mediastinum demonstrates no   significant abnormalities. The Liver is homogeneous in attenuation. The spleen is homogeneous in   attenuation. No contour deforming or enhancing mass lesions are seen of the   pancreas or adrenal glands. The gallbladder has an unremarkable CT appearance   without radiopaque stones or pericholecystic fluid/inflammatory changes. The   kidneys enhance symmetrically and no evidence of hydronephrosis is seen. The visualized loops of small bowel and colon are normal in caliber. The   appendix is seen on axial image 55 without acute abnormality. No free air is   seen. However, mesenteric stranding is seen most evident adjacent to the splenic   flexure the colon although felt to occur adjacent additional segments of colon. Abnormal fluid collection is seen. No adenopathy is seen. The abdominal aorta   shows mild to moderate atherosclerotic calcification. CT PELVIS:   No abnormal pelvic fluid collections or inflammatory changes are present. No   pelvic adenopathy is seen. The urinary bladder appears thick-walled although   this is likely due to poor distention given that no adjacent mesenteric edema is   seen. Impression   1. Mesenteric edema about the colon suggesting colitis.     PHQ-9 Total Score: 0 (9/12/2022  2:40 PM)  Thoughts that you would be better off dead, or of hurting yourself in some way: Not at all (9/12/2022  2:40 PM)  KIMBERLEY-7 SCREENING 9/12/2022   Feeling nervous, anxious, or on edge Not at all   Not being able to stop or control worrying Not at all   Worrying too much about different things Several days   Trouble relaxing Not at all   Being so restless that it is hard to sit still Not at all   Becoming easily annoyed or irritable Several days   Feeling afraid as if something awful might happen Not at all   KIMBERLEY-7 Total Score 2   How difficult have these problems made it for you to do your work, take care of things at home, or get along with other people? Not difficult at all       PLEASE NOTE:  This document has been produced using voice recognition software. Unrecognized errors in transcription may be present. An electronic signature was used to authenticate this note.   -- KELBY Pike NP

## 2022-09-14 DIAGNOSIS — E55.9 VITAMIN D DEFICIENCY: Primary | ICD-10-CM

## 2022-09-14 PROBLEM — N28.9 RENAL INSUFFICIENCY: Status: ACTIVE | Noted: 2022-09-14

## 2022-09-14 LAB
25(OH)D3 SERPL-MCNC: 19.1 NG/ML (ref 30–100)
ALBUMIN SERPL-MCNC: 4 G/DL (ref 3.2–4.6)
ALBUMIN/GLOB SERPL: 1.7 {RATIO} (ref 1.2–3.5)
ALP SERPL-CCNC: 86 U/L (ref 50–136)
ALT SERPL-CCNC: 18 U/L (ref 12–65)
ANION GAP SERPL CALC-SCNC: 4 MMOL/L (ref 4–13)
AST SERPL-CCNC: 15 U/L (ref 15–37)
BILIRUB SERPL-MCNC: 0.3 MG/DL (ref 0.2–1.1)
BUN SERPL-MCNC: 19 MG/DL (ref 8–23)
CALCIUM SERPL-MCNC: 9 MG/DL (ref 8.3–10.4)
CHLORIDE SERPL-SCNC: 106 MMOL/L (ref 101–110)
CO2 SERPL-SCNC: 25 MMOL/L (ref 21–32)
CREAT SERPL-MCNC: 1 MG/DL (ref 0.6–1)
GLOBULIN SER CALC-MCNC: 2.3 G/DL (ref 2.3–3.5)
GLUCOSE SERPL-MCNC: 101 MG/DL (ref 65–100)
HCV AB SER QL: NONREACTIVE
POTASSIUM SERPL-SCNC: 4.4 MMOL/L (ref 3.5–5.1)
PROT SERPL-MCNC: 6.3 G/DL (ref 6.3–8.2)
SODIUM SERPL-SCNC: 135 MMOL/L (ref 136–145)
T4 FREE SERPL-MCNC: 1.1 NG/DL (ref 0.78–1.46)
TSH, 3RD GENERATION: 2.17 UIU/ML (ref 0.36–3.74)

## 2022-10-03 RX ORDER — CHOLESTYRAMINE 4 G/9G
1 POWDER, FOR SUSPENSION ORAL DAILY PRN
Qty: 90 PACKET | Refills: 1 | Status: SHIPPED | OUTPATIENT
Start: 2022-10-03

## 2022-10-12 ENCOUNTER — NURSE ONLY (OUTPATIENT)
Dept: FAMILY MEDICINE CLINIC | Facility: CLINIC | Age: 87
End: 2022-10-12

## 2022-10-12 DIAGNOSIS — E78.5 HYPERLIPIDEMIA, UNSPECIFIED HYPERLIPIDEMIA TYPE: ICD-10-CM

## 2022-10-12 DIAGNOSIS — Z00.00 LABORATORY EXAM ORDERED AS PART OF ROUTINE GENERAL MEDICAL EXAMINATION: ICD-10-CM

## 2022-10-12 LAB
CHOLEST SERPL-MCNC: 160 MG/DL
HDLC SERPL-MCNC: 90 MG/DL (ref 40–60)
HDLC SERPL: 1.8 {RATIO}
LDLC SERPL CALC-MCNC: 55.8 MG/DL
TRIGL SERPL-MCNC: 71 MG/DL (ref 35–150)
VLDLC SERPL CALC-MCNC: 14.2 MG/DL (ref 6–23)

## 2022-10-25 ENCOUNTER — INITIAL CONSULT (OUTPATIENT)
Dept: CARDIOLOGY CLINIC | Age: 87
End: 2022-10-25
Payer: COMMERCIAL

## 2022-10-25 VITALS
BODY MASS INDEX: 23.08 KG/M2 | HEIGHT: 62 IN | SYSTOLIC BLOOD PRESSURE: 138 MMHG | HEART RATE: 81 BPM | DIASTOLIC BLOOD PRESSURE: 66 MMHG

## 2022-10-25 DIAGNOSIS — E78.2 MIXED HYPERLIPIDEMIA: ICD-10-CM

## 2022-10-25 DIAGNOSIS — I10 PRIMARY HYPERTENSION: ICD-10-CM

## 2022-10-25 DIAGNOSIS — I25.10 CORONARY ARTERY DISEASE INVOLVING NATIVE CORONARY ARTERY OF NATIVE HEART WITHOUT ANGINA PECTORIS: Primary | ICD-10-CM

## 2022-10-25 DIAGNOSIS — R05.3 CHRONIC COUGH: ICD-10-CM

## 2022-10-25 PROCEDURE — 93000 ELECTROCARDIOGRAM COMPLETE: CPT | Performed by: INTERNAL MEDICINE

## 2022-10-25 PROCEDURE — 99204 OFFICE O/P NEW MOD 45 MIN: CPT | Performed by: INTERNAL MEDICINE

## 2022-10-25 PROCEDURE — 1123F ACP DISCUSS/DSCN MKR DOCD: CPT | Performed by: INTERNAL MEDICINE

## 2022-10-25 RX ORDER — ASPIRIN 81 MG/1
81 TABLET ORAL DAILY
Qty: 90 TABLET | Refills: 1 | COMMUNITY
Start: 2022-10-25

## 2022-10-25 RX ORDER — AMLODIPINE BESYLATE 5 MG/1
5 TABLET ORAL DAILY
Qty: 90 TABLET | Refills: 3 | Status: SHIPPED | OUTPATIENT
Start: 2022-10-25

## 2022-10-25 ASSESSMENT — ENCOUNTER SYMPTOMS
SHORTNESS OF BREATH: 0
COUGH: 1
BACK PAIN: 1

## 2022-10-25 NOTE — PROGRESS NOTES
[facility] CARDIOLOGY  [address] [facility], [address]  [facility], [address] [place] [address]  PHONE: [phone]      10/25/22    NAME:  Rose Mary Spencer  : 1931  MRN: [id number]         SUBJECTIVE:   Rose Mary Spencer is a 80 y.o. female seen for a consultation visit regarding the following:     Chief Complaint   Patient presents with    Consultation    Coronary Artery Disease    Hypertension    Hyperlipidemia            HPI:  Consultation is requested by KELBY Collier NP for evaluation of Consultation, Coronary Artery Disease, Hypertension, and Hyperlipidemia   . Consult to establish local cardiology care having moved here from [place] with her . Followed there for CAD with prior coronary stents. Minimal medical therapy includes low intensity statin with well controlled lipids, and lisinopril      Here with her daughter. Patient recalls little about her cardiac history other than having had a stent. Daughter notes she had presented with syncope at the time and has done well since. She's had a chronic, dry, hacking cough, has been on the ACEi for years. The cough has been so severe she may have some vomiting at night. 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 She's not been taking low dose ASA, some memory loss but no history of intolerance just never felt it helped her feel any differently. PAST CARDIAC HISTORY:  ~- coronary stent in [place] - no records          Past Medical History, Past Surgical History, Family history, Social History, and Medications were all reviewed with the patient today and updated as necessary. Prior to Admission medications    Medication Sig Start Date End Date Taking?  Authorizing Provider   amLODIPine (NORVASC) 5 MG tablet Take 1 tablet by mouth daily 10/25/22  Yes Itz Hernandez MD   aspirin EC 81 MG EC tablet Take 1 tablet by mouth daily 10/25/22  Yes Nya Roldan MD Di   cholestyramine (QUESTRAN) 4 g packet Take 1 packet by mouth daily as needed (IBS)  Patient taking differently: Take 1 packet by mouth daily 10/3/22  Yes KELBY Stephens NP   Cholecalciferol (VITAMIN D3) 125 MCG (5000 UT) CAPS Take 1 capsule by mouth daily 9/14/22  Yes KELBY Gomez NP   simvastatin (ZOCOR) 40 MG tablet TAKE 1 TABLET BY MOUTH IN THE EVENING ONCE A DAY 90 DAYS 8/17/22  Yes Historical Provider, MD   valACYclovir (VALTREX) 1 g tablet TAKE 2 TABLETS AT THE ONSET OF THE COLD SORE, AND 2 TABLETS 12 HOURS LATER 6/12/22  Yes Historical Provider, MD   naproxen (NAPROSYN) 500 MG tablet Take 500 mg by mouth as needed for Pain   Yes Historical Provider, MD   cetirizine (ZYRTEC) 10 MG tablet Take 10 mg by mouth as needed for Allergies   Yes Historical Provider, MD   Multiple Vitamins-Minerals (THERAPEUTIC MULTIVITAMIN-MINERALS) tablet Take 1 tablet by mouth daily   Yes Historical Provider, MD     Allergies   Allergen Reactions    Seasonal      Past Medical History:   Diagnosis Date    CAD (coronary artery disease) 2008    Had 4 stents    GERD (gastroesophageal reflux disease)     Has been in ER about 2 mo ago for issues Has issues with dry coughing, vomiting,and bad bowels issues    Hearing loss     Been wearing aids for a long time    Hyperlipidemia     Irritable bowel syndrome     Never professionally diagnosed but thinks she has it    Osteoarthritis Many years    Has it in neck, back and hands    Primary hypertension      Past Surgical History:   Procedure Laterality Date    CARDIAC SURGERY  2008    4 stents    COSMETIC SURGERY  Many    Eyes, and 3 face lifts    HYSTERECTOMY, TOTAL ABDOMINAL (CERVIX REMOVED)  1972    JOINT REPLACEMENT Bilateral 20-30 yrs ago    Knees     Family History   Problem Relation Age of Onset    Alcohol Abuse Father     Alcohol Abuse Brother     Alcohol Abuse Sister     Alcohol Abuse Brother      Social History     Tobacco Use    Smoking status: Never    Smokeless tobacco: Never   Substance Use Topics    Alcohol use: Yes     Alcohol/week: 14.0 standard drinks     Types: 14 Glasses of wine per week     Comment: Has had a history of bad drinking       ROS:    Review of Systems   Cardiovascular:  Negative for chest pain. Respiratory:  Positive for cough. Negative for shortness of breath. Musculoskeletal:  Positive for arthritis, back pain and joint pain. PHYSICAL EXAM:   /66   Pulse 81   Ht 5' 2\" (1.575 m)   LMP 01/01/1975   BMI 23.08 kg/m²      Physical Exam  Constitutional:       Appearance: Normal appearance. HENT:      Head: Normocephalic and atraumatic. Eyes:      Conjunctiva/sclera: Conjunctivae normal.   Neck:      Vascular: No carotid bruit. Cardiovascular:      Rate and Rhythm: Normal rate and regular rhythm. Heart sounds: No murmur heard. No friction rub. No gallop. Pulmonary:      Effort: No respiratory distress. Breath sounds: No wheezing or rales. Musculoskeletal:         General: No swelling. Cervical back: Neck supple. Skin:     General: Skin is warm and dry. Neurological:      General: No focal deficit present. Mental Status: She is alert. Psychiatric:         Mood and Affect: Mood normal.         Behavior: Behavior normal.       Medical problems and test results were reviewed with the patient today.      DATA REVIEW    BMP  Lab Results   Component Value Date/Time     09/12/2022 03:23 PM    K 4.4 09/12/2022 03:23 PM     09/12/2022 03:23 PM    CO2 25 09/12/2022 03:23 PM    BUN 19 09/12/2022 03:23 PM    CREATININE 1.00 09/12/2022 03:23 PM    GLUCOSE 101 09/12/2022 03:23 PM    CALCIUM 9.0 09/12/2022 03:23 PM        LIPIDS  Lab Results   Component Value Date    CHOL 160 10/12/2022     Lab Results   Component Value Date    TRIG 71 10/12/2022     Lab Results   Component Value Date    HDL 90 (H) 10/12/2022     Lab Results   Component Value Date    LDLCALC 55.8 10/12/2022 Lab Results   Component Value Date    LABVLDL 14.2 10/12/2022     Lab Results   Component Value Date    CHOLHDLRATIO 1.8 10/12/2022       EKG    Sinus rhythm 81  normal axis, intervals, ST and T waves  low voltage      CXR/IMAGING        DEVICE INTERROGATION        OUTSIDE RECORDS REVIEW    Records from outside providers have been reviewed and summarized as noted in the HPI, past history and data review sections of this note, and reviewed with patient. .       ASSESSMENT and PLAN    [facility] Staff was seen today for consultation, coronary artery disease, hypertension and hyperlipidemia. Diagnoses and all orders for this visit:    Coronary artery disease involving native coronary artery of native heart without angina pectoris  -     EKG 12 Lead    Primary hypertension  -     EKG 12 Lead    Mixed hyperlipidemia  -     EKG 12 Lead    Chronic cough    Other orders  -     amLODIPine (NORVASC) 5 MG tablet; Take 1 tablet by mouth daily  -     aspirin EC 81 MG EC tablet; Take 1 tablet by mouth daily        IMPRESSION:    No angina. On a good, safe regimen with excellent lipid control, continue with the exception of lisinopril which could be contributing to chronic cough (also likely exacerbated in part by local allergies). Should improve within a month if ACEi related. Low dose amlodipine, discussed possibility of edema, notify me if a problem    Return for BP check with me in 3 months. Encouraged to begin a low dose daily ASA, (daughter recalls patient had multiple stents)    Return in about 3 months (around 1/25/2023). Thank you for allowing me to participate in this patient's care. Please call or contact me if there are any questions or concerns regarding the above.       Glo Carrel, MD  10/25/22  11:40 AM

## 2022-10-27 ENCOUNTER — OFFICE VISIT (OUTPATIENT)
Dept: FAMILY MEDICINE CLINIC | Facility: CLINIC | Age: 87
End: 2022-10-27
Payer: COMMERCIAL

## 2022-10-27 ENCOUNTER — HOSPITAL ENCOUNTER (OUTPATIENT)
Dept: GENERAL RADIOLOGY | Age: 87
Discharge: HOME | End: 2022-10-30

## 2022-10-27 VITALS — DIASTOLIC BLOOD PRESSURE: 82 MMHG | OXYGEN SATURATION: 99 % | HEART RATE: 79 BPM | SYSTOLIC BLOOD PRESSURE: 136 MMHG

## 2022-10-27 DIAGNOSIS — B00.1 COLD SORE: ICD-10-CM

## 2022-10-27 DIAGNOSIS — J30.9 ALLERGIC RHINITIS, UNSPECIFIED SEASONALITY, UNSPECIFIED TRIGGER: ICD-10-CM

## 2022-10-27 DIAGNOSIS — H91.93 BILATERAL HEARING LOSS, UNSPECIFIED HEARING LOSS TYPE: ICD-10-CM

## 2022-10-27 DIAGNOSIS — K21.9 GASTROESOPHAGEAL REFLUX DISEASE WITHOUT ESOPHAGITIS: ICD-10-CM

## 2022-10-27 DIAGNOSIS — I25.10 CORONARY ARTERY DISEASE INVOLVING NATIVE CORONARY ARTERY OF NATIVE HEART WITHOUT ANGINA PECTORIS: ICD-10-CM

## 2022-10-27 DIAGNOSIS — K58.0 IRRITABLE BOWEL SYNDROME WITH DIARRHEA: ICD-10-CM

## 2022-10-27 DIAGNOSIS — E78.2 MIXED HYPERLIPIDEMIA: ICD-10-CM

## 2022-10-27 DIAGNOSIS — M19.90 OSTEOARTHRITIS, UNSPECIFIED OSTEOARTHRITIS TYPE, UNSPECIFIED SITE: ICD-10-CM

## 2022-10-27 DIAGNOSIS — R05.3 CHRONIC COUGH: Primary | ICD-10-CM

## 2022-10-27 DIAGNOSIS — I10 PRIMARY HYPERTENSION: ICD-10-CM

## 2022-10-27 DIAGNOSIS — Z23 ENCOUNTER FOR IMMUNIZATION: ICD-10-CM

## 2022-10-27 DIAGNOSIS — Z91.81 AT HIGH RISK FOR FALLS: ICD-10-CM

## 2022-10-27 DIAGNOSIS — N28.9 RENAL INSUFFICIENCY: ICD-10-CM

## 2022-10-27 DIAGNOSIS — E55.9 VITAMIN D DEFICIENCY: ICD-10-CM

## 2022-10-27 DIAGNOSIS — R05.3 CHRONIC COUGH: ICD-10-CM

## 2022-10-27 LAB
BASOPHILS # BLD: 0.1 K/UL (ref 0–0.2)
BASOPHILS NFR BLD: 1 % (ref 0–2)
DIFFERENTIAL METHOD BLD: ABNORMAL
EOSINOPHIL # BLD: 0.1 K/UL (ref 0–0.8)
EOSINOPHIL NFR BLD: 1 % (ref 0.5–7.8)
ERYTHROCYTE [DISTWIDTH] IN BLOOD BY AUTOMATED COUNT: 11.9 % (ref 11.9–14.6)
HCT VFR BLD AUTO: 36.7 % (ref 35.8–46.3)
HGB BLD-MCNC: 11.8 G/DL (ref 11.7–15.4)
IMM GRANULOCYTES # BLD AUTO: 0 K/UL (ref 0–0.5)
IMM GRANULOCYTES NFR BLD AUTO: 0 % (ref 0–5)
LYMPHOCYTES # BLD: 1.3 K/UL (ref 0.5–4.6)
LYMPHOCYTES NFR BLD: 16 % (ref 13–44)
MCH RBC QN AUTO: 31.1 PG (ref 26.1–32.9)
MCHC RBC AUTO-ENTMCNC: 32.2 G/DL (ref 31.4–35)
MCV RBC AUTO: 96.6 FL (ref 82–102)
MONOCYTES # BLD: 0.8 K/UL (ref 0.1–1.3)
MONOCYTES NFR BLD: 10 % (ref 4–12)
NEUTS SEG # BLD: 5.7 K/UL (ref 1.7–8.2)
NEUTS SEG NFR BLD: 72 % (ref 43–78)
NRBC # BLD: 0 K/UL (ref 0–0.2)
PLATELET # BLD AUTO: 337 K/UL (ref 150–450)
PMV BLD AUTO: 9.7 FL (ref 9.4–12.3)
RBC # BLD AUTO: 3.8 M/UL (ref 4.05–5.2)
WBC # BLD AUTO: 8 K/UL (ref 4.3–11.1)

## 2022-10-27 PROCEDURE — 99214 OFFICE O/P EST MOD 30 MIN: CPT | Performed by: NURSE PRACTITIONER

## 2022-10-27 PROCEDURE — 1123F ACP DISCUSS/DSCN MKR DOCD: CPT | Performed by: NURSE PRACTITIONER

## 2022-10-27 RX ORDER — BENZONATATE 100 MG/1
100 CAPSULE ORAL 3 TIMES DAILY PRN
Qty: 30 CAPSULE | Refills: 2 | Status: SHIPPED | OUTPATIENT
Start: 2022-10-27

## 2022-10-27 RX ORDER — DOXYCYCLINE HYCLATE 100 MG/1
100 CAPSULE ORAL 2 TIMES DAILY
Qty: 14 CAPSULE | Refills: 0 | Status: SHIPPED | OUTPATIENT
Start: 2022-10-27 | End: 2022-11-03

## 2022-10-27 ASSESSMENT — ENCOUNTER SYMPTOMS
BLOOD IN STOOL: 0
ABDOMINAL PAIN: 0
SINUS PAIN: 0
RHINORRHEA: 1
EYES NEGATIVE: 1
COUGH: 1
TROUBLE SWALLOWING: 0
VOICE CHANGE: 0
EYE DISCHARGE: 0
EYE PAIN: 0
COLOR CHANGE: 0
RECTAL PAIN: 0
CHEST TIGHTNESS: 0
BACK PAIN: 0
FACIAL SWELLING: 0
CHOKING: 0
CONSTIPATION: 0
STRIDOR: 0
SHORTNESS OF BREATH: 0
NAUSEA: 0
SINUS PRESSURE: 0
WHEEZING: 0
ANAL BLEEDING: 0
SORE THROAT: 1
VOMITING: 0
ABDOMINAL DISTENTION: 0
DIARRHEA: 1
APNEA: 0

## 2022-10-27 NOTE — PROGRESS NOTES
[address], [address] [place] [address]  Phone: [phone] Fax [phone]  Tejas De La Torre. Niyah MS, APRN, FNP-C  10/27/2022   Chief Complaint   Patient presents with    Follow-up     Pt here today for f/u for routine lab review and to recheck multiple chronic conditions. Pt reports taking her medications and following POC as directed. Please see ROS/Assessment and Plan sections for full details of all items addressed during appointment today. Pt accompanied today by her daughter. ASSESSMENT/PLAN:  Below is the assessment and plan developed based on review of pertinent history, physical exam, labs, studies, and medications. 1. Chronic cough  Pt reports chronic cough for past month or so. Was dry at first but over past few days has become productive at times with yellow sputum. Pt denies any SOB, wheezing, CP, or fever. Pt denies any prior hx of COPD or asthma. Is having some increased allergic rhinitis symptoms (Takes Zyrtec and Flonase for allergic rhinitis but not consistently lately. Reports some nasal congestion with clear rhinorrhea and scratchy sore throat-PND recently. Denies any sinus pressure or pain. Has not been taking her Zyrtec and Flonase consistently lately). Pt saw [facility] Cardiology-Dr. Grant 10/25/22 and she stopped pt's Lisinopril and switched her to Norvasc 5 mg po daily for HTN as she felt the ACE may have been causing her chronic cough. On physical exam today, pt has no findings to suggest acute sinusitis. Lung CTA. RA sat WNL 99%. Discussed with pt. Will have pt take her Flonase and Zyrtec more consistently. Recommend pt take PRN OTC Mucinex as directed to help with allergy symptoms/productive cough. Will also give PRN Tessalon as directed to help with productive cough. Will given Doxy 100 mg po bid for 7 days to cover for possible emerging bronchitis. Will check CXR and CBC today to rule out CAP.  Will have pt continue plan to avoid ACE inhibitors and take Norvasc as directed for HTN. Pt to f/u with me in 2 weeks to recheck. Pt agrees to call sooner for concerns/new or worsening symptoms. Pt agrees to go to ER for severe symptoms as discussed. If not improving, will refer pt to Pulmonary for PFT's to rule out asthma/COPD. Will monitor.   - doxycycline hyclate (VIBRAMYCIN) 100 MG capsule; Take 1 capsule by mouth 2 times daily for 7 days  Dispense: 14 capsule; Refill: 0  - benzonatate (TESSALON) 100 MG capsule; Take 1 capsule by mouth 3 times daily as needed for Cough  Dispense: 30 capsule; Refill: 2  - XR CHEST PA LAT (2 VIEWS); Future  - CBC with Auto Differential; Future    2. Allergic rhinitis, unspecified seasonality, unspecified trigger  See # 1 above. - CBC with Auto Differential; Future    3. Gastroesophageal reflux disease without esophagitis  4. Irritable bowel syndrome with diarrhea  Pt reports GERD/IBS controlled at this time with PRN OTC Tums, current dose of Cholestyramine, and GERD/IBS diet. Pt reports having periods when GERD/IBS seems to worsen, but over past few weeks has been well controlled. Pt was treated for Colitis in May 2022 with abx (seen on CT in ER May 2022). Denies any current symptoms of Colitis. Pt was referred to GI for further evaluation and treatment of her GERD/IBS due to recent hx of Colitis-has appointment 11/11/22. Discussed with pt. Will havd pt continue GERD/IBS diet, current dose of PRN OTC Tums, and current dose of Cholestyramine-denies needing refill, and keep upcoming appointment with GI as scheduled. May consider trying pt on Omeprazole for GERD and PRN Bentyl for IBS-D should her GERD and and IBS-D symptoms worsen again. Pt to f/u with me in 2 weeks. Will monitor. 5. Primary hypertension  Pt saw [facility] Cardiology-Dr. Grant 10/25/22 and she stopped pt's Lisinopril and switched her to Norvasc 5 mg po daily for HTN as she felt the ACE may have been causing the cough.  Pt reports stopping the Lisinopril and taking Norvasc as directed. Pt following heart healthy/DASH diet. Pt's BP /82 today. Discussed with pt. Will have pt continue current dose of Norvasc-denies needing refill and follow heart healthy/DASH diet. Pt to f/u with me in 2 weeks. Will monitor. 6. Mixed hyperlipidemia  Pt reports taking Simvastatin 40 mg po daily and reports following heart healthy/DASH diet as directed for HLD. On 10/12/22, pt's fasting lipids were well controlled and at goal. Discussed with pt. Will have pt continue current dose of Simvastatin-denies needing refill and continue heart healthy/DASH diet. Will check fasting lipids again in 6 months (April 2023) Pt to f/u with me in 2 weeks. Will monitor. 7. Coronary artery disease involving native coronary artery of native heart without angina pectoris  Pt reports having hx of CAD-hx of cardiac stents in past per pt. Pt was seeing Cardiology in [place] and was referred at last appointment to local Cardiologist to monitor/manage CAD. Pt saw [facility] Cardiology-Dr. Grant 10/25/22 and established care for her CAD. Pt denies any recent or current chest pain or pressure or SOB. Discussed with pt. Pt encouraged to continue medications/POC per [facility] Cardiology-Dr. Charisma Hernandez for CAD. Pt agrees to go to ER or call 9-1-1 for any symptoms of MI as discussed. Pt to f/u with me in 2 weeks. Will monitor. 8. Osteoarthritis, unspecified osteoarthritis type, unspecified site  Pt reports having OA pain to neck, back, hands-relieved by occasional PRN OTC Aleve, but this was discouraged previously and pt recommended to take PRN OTC Tylenol Arthritis as directed due to age/recent mild renal insufficiency/hx of GERD. Discussed with pt. Pt again encouraged to avoid PRN OTC Aleve and other NSAIDs and take PRN OTC Tylenol Arthritis as directed for her OA pain. Pt to f/u with me in in 2 weeks. Will monitor. 9. Cold sore  Pt reports having a hx of cold sores to her mouth.  Takes PRN Valtrex with relief. Denies having any recent cold sores. Discussed with pt. Will have pt continue PRN Valtrex-denies needing refill. Pt to f/u with me in 2 weeks. Will monitor. 10. Vitamin D deficiency  On 22, pt's Vitamin D level was low at at 19.1. I stopped Vitamin D 2000 units po daily and sent new Rx for Vitamin D 5000 units po daily to pt's pharmacy. Pt reports taking as directed. Discussed with pt. Will have pt continue Vitamin D 5000 units po daily. Will recheck Vitamin D level in 3 months (2023). Pt to f/u with me in 2 weeks. Will monitor. 11. Bilateral hearing loss, unspecified hearing loss type  Pt has bilat hearing loss-bilat hearing aids. 12. Renal insufficiency  On 22, pt's serum creatinine was normal at 1.00, but GFR was a little low at 55. Pt was encouraged to stay well hydrated and to avoid PRN OTC Aleve/other OTC NSAIDs. Discussed with pt. Will recheck BMP today. If renal insufficiency still present, will refer pt to Nephrology. Will have pt continue to avoid PRN OTC Aleve/other OTC NSAIDs and stay well hydrated. Pt to f/u with me in 2 weeks. Will monitor.   - Basic Metabolic Panel; Future    13. Encounter for immunization  Pt declined Covid, Flu, Tdap, Shingrix, Pneumonia vaccines. Will continue to encourage pt to get UTD on her vaccines at future appointments. 14. At high risk for falls  Pt reports having 1 fall in past year but none recently per pt. Pt reports that she has not fallen since she got her rolling walker. Pt encouraged to continue to use rolling walker at all times and report any further falls to me. Pt to f/u with me in 2 weeks. Will monitor. Return in about 2 weeks (around 11/10/2022) for F/u with me in 2 weeks to recheck cough/allergies. Call sooner for concerns. ER for severe symptoms.         SUBJECTIVE/OBJECTIVE:    HPI 22-  Alta Moran (: 1931) is a 80 y.o. female, New patient patient, here for evaluation of the following chief complaint(s):  New Patient (Pt here today to [facility]. Reports moving to area from [place] last year. Most recently seen by PA at the [facility] earlier this year. Lives with her . Daughter checks on pt and her  frequently. ) and Other (Pt reports having a hx of multiple chronic conditions and takes several medications. Med rec completed with pt's daughter who had pt's medication list. Pt requesting referrals to local Cardiologist and GI. Pt needs refill on Lisinopril. Denies needing refill on any other medications. Please see ROS/Assessment and Plan section for full details of all items addressed during today's assessment. /)    HPI today-  Andrea Sanchez (: 1931) is a 80 y.o. female, Established patient patient, here for evaluation of the following chief complaint(s):  Follow-up (Pt here today for f/u for routine lab review and to recheck multiple chronic conditions. Pt reports taking her medications and following POC as directed. Please see ROS/Assessment and Plan sections for full details of all items addressed during appointment today.  Pt accompanied today by her daughter. )     Allergies   Allergen Reactions    Seasonal      [unfilled]  Past Medical History:   Diagnosis Date    CAD (coronary artery disease)     Had 4 stents    GERD (gastroesophageal reflux disease)     Has been in ER about 2 mo ago for issues Has issues with dry coughing, vomiting,and bad bowels issues    Hearing loss     Been wearing aids for a long time    Hyperlipidemia     Irritable bowel syndrome     Never professionally diagnosed but thinks she has it    Osteoarthritis Many years    Has it in neck, back and hands    Primary hypertension      Past Surgical History:   Procedure Laterality Date    CARDIAC SURGERY      4 stents    COSMETIC SURGERY  Many    Eyes, and 3 face lifts    HYSTERECTOMY, TOTAL ABDOMINAL (CERVIX REMOVED)      JOINT REPLACEMENT Bilateral 20-30 yrs ago Knees     Family History   Problem Relation Age of Onset    Alcohol Abuse Father     Alcohol Abuse Brother     Alcohol Abuse Sister     Alcohol Abuse Brother      Social History     Tobacco Use   Smoking Status Never   Smokeless Tobacco Never         Review of Systems   Constitutional: Negative. Negative for appetite change, chills, diaphoresis, fatigue, fever and unexpected weight change. HENT:  Positive for congestion, hearing loss (has bilat hearing aids), postnasal drip (scratchy sore throat-PND), rhinorrhea (clear) and sore throat (scratchy sore throat-PND). Negative for dental problem, drooling, ear discharge, ear pain, facial swelling, mouth sores, nosebleeds, sinus pressure, sinus pain, sneezing, tinnitus, trouble swallowing and voice change. Takes Zyrtec and Flonase for allergic rhinitis but not consistently lately. Reports some nasal congestion with clear rhinorrhea and scratchy sore throat-PND recently. Denies any sinus pressure or pain. Eyes: Negative. Negative for pain, discharge and visual disturbance. Respiratory:  Positive for cough. Negative for apnea, choking, chest tightness, shortness of breath, wheezing and stridor. Pt reports chronic cough for past month or so. Was dry at first but over past few days has become productive at times with yellow sputum. Pt denies any SOB, wheezing, CP, or fever. Pt denies any prior hx of COPD or asthma. Is having some increased allergic rhinitis symptoms-see HENT. Has not been taking her Zyrtec and Flonase consistently lately. Pt saw [facility] Cardiology-Dr. Nara Huerta 10/25/22 and she stopped pt's Lisinopril and switched her to Norvasc 5 mg po daily for HTN as she felt the ACE may have been causing her chronic cough   Cardiovascular: Negative. Negative for chest pain, palpitations and leg swelling. Gastrointestinal:  Positive for diarrhea (IBS-D controlled with current dose of Cholesteryramine and IBS diet per pt).  Negative for abdominal distention, abdominal pain, anal bleeding, blood in stool, constipation, nausea, rectal pain and vomiting. GERD controlled at present with PRN OTC Tums/GERD diet per pt    Endocrine: Negative. Negative for cold intolerance, heat intolerance, polydipsia, polyphagia and polyuria. Genitourinary: Negative. Negative for decreased urine volume, difficulty urinating, dyspareunia, dysuria, flank pain, frequency, genital sores, hematuria, menstrual problem, pelvic pain, urgency, vaginal bleeding, vaginal discharge and vaginal pain. Musculoskeletal:  Positive for arthralgias (OA pain to neck, back, hands-relieved by occasional PRN OTC Aleve, but this was discouraged previously and pt recommended to take PRN OTC Tylenol Arthritis as directed due to age/recent mild renal insufficiency/hx of GERD) and gait problem (uses rolling walker due to OA pain). Negative for back pain, joint swelling, myalgias, neck pain and neck stiffness. Skin:  Negative for color change, pallor, rash and wound. Pt reports having a hx of cold sores to her mouth. None recent. Takes PRN Valtrex with relief   Allergic/Immunologic: Positive for environmental allergies (Takes Zyrtec and Flonase for allergic rhinitis but not consistently lately. Reports some nasal congestion with clear rhinorrhea and scratchy sore throat-PND recently. ). Neurological:  Negative for dizziness, tremors, seizures, syncope, facial asymmetry, speech difficulty, weakness, light-headedness, numbness and headaches. Hematological: Negative. Negative for adenopathy. Does not bruise/bleed easily. Psychiatric/Behavioral: Negative. Negative for agitation, behavioral problems, confusion, decreased concentration, dysphoric mood, hallucinations, self-injury, sleep disturbance and suicidal ideas. The patient is not nervous/anxious and is not hyperactive. Vitals:    10/27/22 1418   BP: 136/82   Pulse: 79   SpO2: 99%       Physical Exam  Vitals reviewed. Constitutional:       General: She is not in acute distress. Appearance: Normal appearance. She is normal weight. She is not ill-appearing, toxic-appearing or diaphoretic. HENT:      Head: Normocephalic and atraumatic. Right Ear: Tympanic membrane, ear canal and external ear normal. There is no impacted cerumen. Left Ear: Tympanic membrane, ear canal and external ear normal. There is no impacted cerumen. Ears:      Comments: Bilat hearing aids removed from ear exam and then replaced     Nose: Congestion (turbs boggy and edematous) and rhinorrhea (clear) present. Comments: No sinus ttp       Mouth/Throat:      Mouth: Mucous membranes are moist.      Pharynx: Posterior oropharyngeal erythema (cobblestone appearance-PND. No tonsillary edema or exudates) present. No oropharyngeal exudate. Comments: No cold sores noted to mouth at present  Eyes:      General: No scleral icterus. Right eye: No discharge. Left eye: No discharge. Extraocular Movements: Extraocular movements intact. Conjunctiva/sclera: Conjunctivae normal.      Pupils: Pupils are equal, round, and reactive to light. Cardiovascular:      Rate and Rhythm: Normal rate and regular rhythm. Pulses: Normal pulses. Heart sounds: Normal heart sounds. No murmur heard. No friction rub. No gallop. Pulmonary:      Effort: Pulmonary effort is normal. No respiratory distress. Breath sounds: Normal breath sounds. No stridor. No wheezing, rhonchi or rales. Chest:      Chest wall: No tenderness. Abdominal:      General: Abdomen is flat. Bowel sounds are normal. There is no distension. Palpations: Abdomen is soft. There is no mass. Tenderness: There is no abdominal tenderness. There is no right CVA tenderness, left CVA tenderness, guarding or rebound. Hernia: No hernia is present. Musculoskeletal:         General: No swelling, tenderness, deformity or signs of injury.  Normal range of motion. Cervical back: Normal range of motion and neck supple. No rigidity or tenderness. Right lower leg: No edema. Left lower leg: No edema. Comments: gait slow but steady and assisted by rolling walker   Lymphadenopathy:      Cervical: No cervical adenopathy. Skin:     General: Skin is warm. Capillary Refill: Capillary refill takes less than 2 seconds. Coloration: Skin is not jaundiced or pale. Findings: No bruising, erythema, lesion or rash. Neurological:      General: No focal deficit present. Mental Status: She is alert and oriented to person, place, and time. Cranial Nerves: No cranial nerve deficit. Sensory: No sensory deficit. Motor: No weakness. Coordination: Coordination normal.      Gait: Gait (gait slow but steady and assisted by rolling walker) normal.   Psychiatric:         Mood and Affect: Mood normal.         Behavior: Behavior normal.         Thought Content:  Thought content normal.         Judgment: Judgment normal.     Following labs reviewed with pt today  Component      Latest Ref Rng & Units 10/12/2022           9:08 AM   CHOLESTEROL, TOTAL, [id number]      <200 MG/   Triglycerides      35 - 150 MG/DL 71   HDL Cholesterol      40 - 60 MG/DL 90 (H)   LDL Calculated      <100 MG/DL 55.8   VLDL Cholesterol Calculated      6.0 - 23.0 MG/DL 14.2   Chol/HDL Ratio       1.8     Component      Latest Ref Rng & Units 9/12/2022           3:23 PM   WBC      4.3 - 11.1 K/uL 6.3   RBC      4.05 - 5.2 M/uL 3.91 (L)   Hemoglobin Quant      11.7 - 15.4 g/dL 12.3   Hematocrit      35.8 - 46.3 % 38.4   MCV      79.6 - 97.8 FL 98.2 (H)   MCH      26.1 - 32.9 PG 31.5   MCHC      31.4 - 35.0 g/dL 32.0   RDW      11.9 - 14.6 % 12.6   Platelet Count      615 - 450 K/uL 241   MPV      9.4 - 12.3 FL 10.8   Nucleated Red Blood Cells      0.0 - 0.2 K/uL 0.00   Differential Type       AUTOMATED   Seg Neutrophils      43 - 78 % 58   Lymphocytes 13 - 44 % 28   Monocytes      4.0 - 12.0 % 11   Eosinophils %      0.5 - 7.8 % 2   Basophils      0.0 - 2.0 % 1   Immature Granulocytes      0.0 - 5.0 % 0   Segs Absolute      1.7 - 8.2 K/UL 3.6   Absolute Lymph #      0.5 - 4.6 K/UL 1.7   Absolute Mono #      0.1 - 1.3 K/UL 0.7   Absolute Eos #      0.0 - 0.8 K/UL 0.1   Basophils Absolute      0.0 - 0.2 K/UL 0.1   Absolute Immature Granulocyte      0.0 - 0.5 K/UL 0.0     Component      Latest Ref Rng & Units 9/12/2022 5/22/2022           3:23 PM  2:04 PM   Sodium      136 - 145 mmol/L 135 (L) 140   Potassium      3.5 - 5.1 mmol/L 4.4 4.3   Chloride      101 - 110 mmol/L 106 103   CO2      21 - 32 mmol/L 25 28   Anion Gap      4 - 13 mmol/L 4 9   Glucose, Random      65 - 100 mg/dL 101 (H) 107 (H)   BUN,BUNPL      8 - 23 MG/DL 19 14   Creatinine      0.6 - 1.0 MG/DL 1.00 0.78   GFR African American      >60 ml/min/1.73m2 >60 >60   GFR Non-African American      >60 ml/min/1.73m2 55 (L) >60   CALCIUM, SERUM, 349722      8.3 - 10.4 MG/DL 9.0 9.4   Bilirubin      0.2 - 1.1 MG/DL 0.3 0.6   ALT      12 - 65 U/L 18 21   AST      15 - 37 U/L 15 24   Alk Phosphatase      50 - 136 U/L 86 77   Total Protein      6.3 - 8.2 g/dL 6.3 7.0   Albumin      3.2 - 4.6 g/dL 4.0 4.0   Globulin      2.3 - 3.5 g/dL 2.3 3.0   ALBUMIN/GLOBULIN RATIO      1.2 - 3.5   1.7 1.3     Component      Latest Ref Rng & Units 9/12/2022 9/12/2022           3:23 PM  3:23 PM   T4 Free      0.78 - 1.46 NG/DL  1.1   TSH, 3RD GENERATION      0.358 - 3.740 uIU/mL 2.170      Component      Latest Ref Rng & Units 9/12/2022           3:23 PM   Hepatitis C Ab      NONREACTIVE   NONREACTIVE     Component      Latest Ref Rng & Units 9/12/2022           3:23 PM   Vit D, 25-Hydroxy      30.0 - 100.0 ng/mL 19.1 (L)     PLEASE NOTE:  This document has been produced using voice recognition software. Unrecognized errors in transcription may be present.      On this date 10/27/2022 I have spent 30 minutes reviewing previous notes, test results and face to face with the patient discussing the diagnosis and importance of compliance with the treatment plan as well as documenting on the day of the visit. An electronic signature was used to authenticate this note.   -- KELBY Marshall NP

## 2022-10-28 ENCOUNTER — CLINICAL DOCUMENTATION (OUTPATIENT)
Dept: FAMILY MEDICINE CLINIC | Facility: CLINIC | Age: 87
End: 2022-10-28

## 2022-10-28 DIAGNOSIS — R11.0 NAUSEA: Primary | ICD-10-CM

## 2022-10-28 PROBLEM — J18.9 COMMUNITY ACQUIRED PNEUMONIA OF LEFT LOWER LOBE OF LUNG: Status: ACTIVE | Noted: 2022-10-28

## 2022-10-28 LAB
ANION GAP SERPL CALC-SCNC: 4 MMOL/L (ref 2–11)
BUN SERPL-MCNC: 17 MG/DL (ref 8–23)
CALCIUM SERPL-MCNC: 9.4 MG/DL (ref 8.3–10.4)
CHLORIDE SERPL-SCNC: 104 MMOL/L (ref 101–110)
CO2 SERPL-SCNC: 28 MMOL/L (ref 21–32)
CREAT SERPL-MCNC: 0.7 MG/DL (ref 0.6–1)
GLUCOSE SERPL-MCNC: 108 MG/DL (ref 65–100)
POTASSIUM SERPL-SCNC: 4.6 MMOL/L (ref 3.5–5.1)
SODIUM SERPL-SCNC: 136 MMOL/L (ref 133–143)

## 2022-10-28 RX ORDER — ONDANSETRON 4 MG/1
4 TABLET, FILM COATED ORAL 3 TIMES DAILY PRN
Qty: 15 TABLET | Refills: 0 | Status: SHIPPED | OUTPATIENT
Start: 2022-10-28

## 2022-10-28 NOTE — PROGRESS NOTES
Pt's daughter was at an appointment with pt's  and reported that pt was taking the Doxy as directed for her CAP, but reports that it was causing some nausea. Denies pt having any V/D/abd pain. Denies pt having any fevers, SOB, wheezing, CP. Recommended to pt's daughter that pt take the Doxy with food. Sent Rx for PRN Zofran for pt to take to make the Doxy more tolerable. Advised daughter to take pt to UC or ER over weekend for any worsening symptoms-SOB, wheezing, CP, fever, V/D/abd pain, etc. Pt's daughter verbalized complete understanding. Brian Linder MS, APRN, FNP-C

## 2022-10-28 NOTE — PROGRESS NOTES
Spoke with radiologist Dr. Gulshan Qureshi and clarified that CXR was supposed to read that pt's pneumonia was in the left lower lung not right. He recommended repeat CXR after pt finishes Doxy to be sure it resolves. Will order the CXR at pt's f/u with me 11/10/22. Marielena Linder MS, APRN, FNP-C

## 2022-11-10 ENCOUNTER — HOSPITAL ENCOUNTER (OUTPATIENT)
Dept: GENERAL RADIOLOGY | Age: 87
Discharge: HOME | End: 2022-11-13

## 2022-11-10 ENCOUNTER — OFFICE VISIT (OUTPATIENT)
Dept: FAMILY MEDICINE CLINIC | Facility: CLINIC | Age: 87
End: 2022-11-10
Payer: COMMERCIAL

## 2022-11-10 VITALS
OXYGEN SATURATION: 97 % | WEIGHT: 126 LBS | BODY MASS INDEX: 23.19 KG/M2 | DIASTOLIC BLOOD PRESSURE: 70 MMHG | HEIGHT: 62 IN | SYSTOLIC BLOOD PRESSURE: 110 MMHG | HEART RATE: 86 BPM

## 2022-11-10 DIAGNOSIS — H91.93 BILATERAL HEARING LOSS, UNSPECIFIED HEARING LOSS TYPE: ICD-10-CM

## 2022-11-10 DIAGNOSIS — N28.9 RENAL INSUFFICIENCY: ICD-10-CM

## 2022-11-10 DIAGNOSIS — Z23 ENCOUNTER FOR IMMUNIZATION: ICD-10-CM

## 2022-11-10 DIAGNOSIS — K58.0 IRRITABLE BOWEL SYNDROME WITH DIARRHEA: ICD-10-CM

## 2022-11-10 DIAGNOSIS — J18.9 COMMUNITY ACQUIRED PNEUMONIA OF LEFT LOWER LOBE OF LUNG: Primary | ICD-10-CM

## 2022-11-10 DIAGNOSIS — R05.3 CHRONIC COUGH: ICD-10-CM

## 2022-11-10 DIAGNOSIS — J30.9 ALLERGIC RHINITIS, UNSPECIFIED SEASONALITY, UNSPECIFIED TRIGGER: ICD-10-CM

## 2022-11-10 DIAGNOSIS — B00.1 COLD SORE: ICD-10-CM

## 2022-11-10 DIAGNOSIS — K21.9 GASTROESOPHAGEAL REFLUX DISEASE WITHOUT ESOPHAGITIS: ICD-10-CM

## 2022-11-10 DIAGNOSIS — E55.9 VITAMIN D DEFICIENCY: ICD-10-CM

## 2022-11-10 DIAGNOSIS — I25.10 CORONARY ARTERY DISEASE INVOLVING NATIVE CORONARY ARTERY OF NATIVE HEART WITHOUT ANGINA PECTORIS: ICD-10-CM

## 2022-11-10 DIAGNOSIS — I10 PRIMARY HYPERTENSION: ICD-10-CM

## 2022-11-10 DIAGNOSIS — J18.9 COMMUNITY ACQUIRED PNEUMONIA OF LEFT LOWER LOBE OF LUNG: ICD-10-CM

## 2022-11-10 DIAGNOSIS — M19.90 OSTEOARTHRITIS, UNSPECIFIED OSTEOARTHRITIS TYPE, UNSPECIFIED SITE: ICD-10-CM

## 2022-11-10 DIAGNOSIS — E78.2 MIXED HYPERLIPIDEMIA: ICD-10-CM

## 2022-11-10 PROCEDURE — 1123F ACP DISCUSS/DSCN MKR DOCD: CPT | Performed by: NURSE PRACTITIONER

## 2022-11-10 PROCEDURE — 99214 OFFICE O/P EST MOD 30 MIN: CPT | Performed by: NURSE PRACTITIONER

## 2022-11-10 ASSESSMENT — ENCOUNTER SYMPTOMS
SHORTNESS OF BREATH: 0
STRIDOR: 0
TROUBLE SWALLOWING: 0
BLOOD IN STOOL: 0
WHEEZING: 0
RECTAL PAIN: 0
CHOKING: 0
EYES NEGATIVE: 1
CONSTIPATION: 0
NAUSEA: 0
APNEA: 0
ABDOMINAL PAIN: 0
SINUS PRESSURE: 0
ANAL BLEEDING: 0
COUGH: 0
CHEST TIGHTNESS: 0
EYE DISCHARGE: 0
VOMITING: 0
COLOR CHANGE: 0
VOICE CHANGE: 0
DIARRHEA: 1
BACK PAIN: 0
RHINORRHEA: 0
SINUS PAIN: 0
ABDOMINAL DISTENTION: 0
FACIAL SWELLING: 0
EYE PAIN: 0
SORE THROAT: 0

## 2022-11-10 ASSESSMENT — PATIENT HEALTH QUESTIONNAIRE - PHQ9
SUM OF ALL RESPONSES TO PHQ9 QUESTIONS 1 & 2: 0
2. FEELING DOWN, DEPRESSED OR HOPELESS: 0
SUM OF ALL RESPONSES TO PHQ QUESTIONS 1-9: 0
1. LITTLE INTEREST OR PLEASURE IN DOING THINGS: 0
SUM OF ALL RESPONSES TO PHQ QUESTIONS 1-9: 0

## 2022-11-10 NOTE — PROGRESS NOTES
[address] [place] [address], [address] [place] [address]  Phone: [phone] Fax [phone]  Shireen Linder MS, APRN, FNP-C  11/10/2022   Chief Complaint   Patient presents with    Follow-up     Pt here today to recheck CAP to LLL and to recheck multiple chronic conditions. Pt's accompanied today by her daughter. Pneumonia     Pt had previously reported having a chronic cough for past month or so. Was dry at first but became productive with yellow sputum. Pt had a CXR 10/27/22 that revealed  possible patchy pneumonia to LLL. CBC was WNL. Pt was treated with Doxy 100 mg po bid for 7 days as well as PRN OTC Mucinex and PRN Tessalon. Pt reports taking as directed. Allergic Reaction     Pt reports several times that she felt that Doxy caused her to itch, but never developed a rash or any face, lip, tongue, or throat swelling. Pt was able to finish the Doxy-due to reaction will list Doxy as possible allergy however. Pt reports that her productive cough has completely resolved. Pt denies having any fevers and denies any SOB, wheezing, or CP. Allergic Rhinitis      Pt reports taking Zyrtec and Flonase for allergic rhinitis as directed-now well controlled. Denies any current symptoms. Please see ROS/Assessment and Plan sections for full details of all other items addressed during today's appointment. ASSESSMENT/PLAN:  Below is the assessment and plan developed based on review of pertinent history, physical exam, labs, studies, and medications. 1. Community acquired pneumonia of left lower lobe of lung  2. Chronic cough  Pt had previously reported having a chronic cough for past month or so. Was dry at first but became productive with yellow sputum. Pt had a CXR 10/27/22 that revealed  possible patchy pneumonia to LLL. CBC was WNL. Pt was treated with Doxy 100 mg po bid for 7 days as well as PRN OTC Mucinex and PRN Tessalon.  Pt reports taking as directed. ), Allergic Reaction (Pt reports several times that she felt that Doxy caused her to itch, but never developed a rash or any face, lip, tongue, or throat swelling. Pt was able to finish the Doxy-due to reaction will list Doxy as possible allergy however. Pt reports that her productive cough has completely resolved. Pt denies having any fevers and denies any SOB, wheezing, or CP. Pt's lungs CTA today. RA sat WNL 97%. Discussed with pt. Will recheck CXR today to be sure that CAP to LLL has resolved. Pt to f/u with me in 8 weeks. Pt agrees to call me sooner should cough return. Will monitor. - XR CHEST PA LAT (2 VIEWS); Future    3. Allergic rhinitis, unspecified seasonality, unspecified trigger  Pt reports taking Zyrtec and Flonase for allergic rhinitis as directed-now well controlled. Denies any current symptoms. Discussed with pt. Will have pt continue current dose of Flonase and Zyrtec-denies needing refills. Pt to f/u with me in 8 weeks. Will monitor. 4. Gastroesophageal reflux disease without esophagitis  5. Irritable bowel syndrome with diarrhea  Pt reports GERD/IBS controlled at this time with PRN OTC Tums, current dose of Cholestyramine, and GERD/IBS diet. Pt reports having periods when GERD/IBS seems to worsen, but over past few weeks has been well controlled. Pt was treated for Colitis in May 2022 with abx (seen on CT in ER May 2022). Denies any current symptoms of Colitis. Pt was referred to GI for further evaluation and treatment of her GERD/IBS due to recent hx of Colitis-has appointment tomorrow, 11/11/22. Discussed with pt. Will have pt continue GERD/IBS diet, current dose of PRN OTC Tums, and current dose of Cholestyramine-denies needing refill, and keep upcoming appointment with GI as scheduled. May consider trying pt on Omeprazole for GERD and PRN Bentyl for IBS-D should her GERD and and IBS-D symptoms worsen again. Pt to f/u with me in 8 weeks.  Will monitor.   - Comprehensive Metabolic Panel; Future    6. Primary hypertension  Pt reports taking Norvasc 5 mg po daily as directed. Pt following heart healthy/DASH diet. Pt's BP /70 today. Discussed with pt. Will have pt continue current dose of Norvasc-denies needing refill and follow heart healthy/DASH diet. Pt to f/u with me in 8 weeks. Will monitor.   - Comprehensive Metabolic Panel; Future    7. Mixed hyperlipidemia  Pt reports taking Simvastatin 40 mg po daily and reports following heart healthy/DASH diet as directed for HLD. On 10/12/22, pt's fasting lipids were well controlled and at goal. Discussed with pt. Will have pt continue current dose of Simvastatin-denies needing refill and continue heart healthy/DASH diet. Will check fasting lipids again at 6 month point (April 2023) Pt to f/u with me in 8 weeks. Will monitor. 8. Coronary artery disease involving native coronary artery of native heart without angina pectoris  Pt reports having hx of CAD-hx of cardiac stents in past per pt. Pt was seeing Cardiology in [place] and was referred at last appointment to local Cardiologist to monitor/manage CAD. Pt saw [facility] Cardiology-Dr. Grant 10/25/22 and established care for her CAD. Pt denies any recent or current chest pain or pressure or SOB. Discussed with pt. Pt encouraged to continue medications/POC per [facility] Cardiology-Dr. Elena Brannon for CAD. Pt agrees to go to ER or call 9-1-1 for any symptoms of MI as discussed. Pt to f/u with me in 8 weeks. Will monitor.   - Comprehensive Metabolic Panel; Future    9. Osteoarthritis, unspecified osteoarthritis type, unspecified site  Pt reports having OA pain to neck, back, hands-relieved by occasional PRN OTC Aleve, but this was discouraged previously and pt recommended to take PRN OTC Tylenol Arthritis as directed due to age/recent mild renal insufficiency/hx of GERD. Discussed with pt.  Pt again encouraged to avoid PRN OTC Aleve and other NSAIDs and take PRN OTC Tylenol Arthritis as directed for her OA multiple chronic conditions. Pt reports taking her medications and following POC as directed. Please see ROS/Assessment and Plan sections for full details of all items addressed during appointment today. Pt accompanied today by her daughter. )       HPI today-  Sarika Ortega (: 1931) is a 80 y.o. female, Established patient patient, here for evaluation of the following chief complaint(s):  Follow-up (Pt here today to recheck CAP to LLL and to recheck multiple chronic conditions. Pt's accompanied today by her daughter. /), Pneumonia (Pt had previously reported having a chronic cough for past month or so. Was dry at first but became productive with yellow sputum. Pt had a CXR 10/27/22 that revealed  possible patchy pneumonia to LLL. CBC was WNL. Pt was treated with Doxy 100 mg po bid for 7 days as well as PRN OTC Mucinex and PRN Tessalon. Pt reports taking as directed. ), Allergic Reaction (Pt reports several times that she felt that Doxy caused her to itch, but never developed a rash or any face, lip, tongue, or throat swelling. Pt was able to finish the Doxy-due to reaction will list Doxy as possible allergy however. Pt reports that her productive cough has completely resolved. Pt denies having any fevers and denies any SOB, wheezing, or CP./), and Allergic Rhinitis  (Pt reports taking Zyrtec and Flonase for allergic rhinitis as directed-now well controlled. Denies any current symptoms.  Please see ROS/Assessment and Plan sections for full details of all other items addressed during today's appointment. )     Allergies   Allergen Reactions    Doxycycline Itching    Seasonal      [unfilled]  Past Medical History:   Diagnosis Date    CAD (coronary artery disease)     Had 4 stents    GERD (gastroesophageal reflux disease)     Has been in ER about 2 mo ago for issues Has issues with dry coughing, vomiting,and bad bowels issues    Hearing loss     Been wearing aids for a long time    Hyperlipidemia Irritable bowel syndrome     Never professionally diagnosed but thinks she has it    Osteoarthritis Many years    Has it in neck, back and hands    Primary hypertension      Past Surgical History:   Procedure Laterality Date    CARDIAC SURGERY  2008    4 stents    COSMETIC SURGERY  Many    Eyes, and 3 face lifts    HYSTERECTOMY, TOTAL ABDOMINAL (CERVIX REMOVED)  1972    JOINT REPLACEMENT Bilateral 20-30 yrs ago    Knees     Family History   Problem Relation Age of Onset    Alcohol Abuse Father     Alcohol Abuse Brother     Alcohol Abuse Sister     Alcohol Abuse Brother      Social History     Tobacco Use   Smoking Status Never   Smokeless Tobacco Never         Review of Systems   Constitutional: Negative. Negative for appetite change, chills, diaphoresis, fatigue, fever and unexpected weight change. HENT:  Positive for hearing loss (has bilat hearing aids). Negative for congestion, dental problem, drooling, ear discharge, ear pain, facial swelling, mouth sores, nosebleeds, postnasal drip, rhinorrhea, sinus pressure, sinus pain, sneezing, sore throat, tinnitus, trouble swallowing and voice change. Pt reports taking Zyrtec and Flonase for allergic rhinitis as directed-now well controlled. Denies any current symptoms. Eyes: Negative. Negative for pain, discharge and visual disturbance. Respiratory:  Negative for apnea, cough (resolved), choking, chest tightness, shortness of breath, wheezing and stridor. Pt had previously reported having a chronic cough for past month or so. Was dry at first but became productive with yellow sputum. Pt had a CXR 10/27/22 that revealed  possible patchy pneumonia to LLL. CBC was WNL. Pt was treated with Doxy 100 mg po bid for 7 days as well as PRN OTC Mucinex and PRN Tessalon. Pt reports taking as directed. Pt reports several times that she felt that Doxy caused her to itch, but never developed a rash or any face, lip, tongue, or throat swelling.  Pt was able to finish the Doxy-due to reaction will list Doxy as possible allergy however. Pt reports that her productive cough has completely resolved. Pt denies having any fevers and denies any SOB, wheezing, or CP. Cardiovascular: Negative. Negative for chest pain, palpitations and leg swelling. Gastrointestinal:  Positive for diarrhea (IBS-D controlled with current dose of Cholesteryramine and IBS diet per pt). Negative for abdominal distention, abdominal pain, anal bleeding, blood in stool, constipation, nausea, rectal pain and vomiting. GERD controlled at present with PRN OTC Tums/GERD diet per pt    Endocrine: Negative. Negative for cold intolerance, heat intolerance, polydipsia, polyphagia and polyuria. Genitourinary: Negative. Negative for decreased urine volume, difficulty urinating, dyspareunia, dysuria, flank pain, frequency, genital sores, hematuria, menstrual problem, pelvic pain, urgency, vaginal bleeding, vaginal discharge and vaginal pain. Musculoskeletal:  Positive for arthralgias (OA pain to neck, back, hands-relieved by occasional PRN OTC Aleve, but this was discouraged previously and pt recommended to take PRN OTC Tylenol Arthritis as directed due to age/recent mild renal insufficiency/hx of GERD) and gait problem (uses rolling walker due to OA pain). Negative for back pain, joint swelling, myalgias, neck pain and neck stiffness. Skin:  Negative for color change, pallor, rash and wound. Pt reports having a hx of cold sores to her mouth. None recent. Takes PRN Valtrex with relief   Allergic/Immunologic: Positive for environmental allergies (Pt reports taking Zyrtec and Flonase for allergic rhinitis as directed-now well controlled. Denies any current symptoms. ). Neurological:  Negative for dizziness, tremors, seizures, syncope, facial asymmetry, speech difficulty, weakness, light-headedness, numbness and headaches. Hematological: Negative. Negative for adenopathy.  Does not bruise/bleed easily. Psychiatric/Behavioral: Negative. Negative for agitation, behavioral problems, confusion, decreased concentration, dysphoric mood, hallucinations, self-injury, sleep disturbance and suicidal ideas. The patient is not nervous/anxious and is not hyperactive. Vitals:    11/10/22 1451   BP: 110/70   Pulse: 86   SpO2: 97%       Physical Exam  Vitals reviewed. Constitutional:       General: She is not in acute distress. Appearance: Normal appearance. She is normal weight. She is not ill-appearing, toxic-appearing or diaphoretic. HENT:      Head: Normocephalic and atraumatic. Right Ear: Tympanic membrane, ear canal and external ear normal. There is no impacted cerumen. Left Ear: Tympanic membrane, ear canal and external ear normal. There is no impacted cerumen. Ears:      Comments: Bilat hearing aids removed from ear exam and then replaced     Nose: Nose normal. No congestion or rhinorrhea. Comments: No sinus ttp       Mouth/Throat:      Mouth: Mucous membranes are moist.      Pharynx: Oropharynx is clear. No oropharyngeal exudate or posterior oropharyngeal erythema. Comments: No cold sores noted to mouth at present  Eyes:      General: No scleral icterus. Right eye: No discharge. Left eye: No discharge. Extraocular Movements: Extraocular movements intact. Conjunctiva/sclera: Conjunctivae normal.      Pupils: Pupils are equal, round, and reactive to light. Cardiovascular:      Rate and Rhythm: Normal rate and regular rhythm. Pulses: Normal pulses. Heart sounds: Normal heart sounds. No murmur heard. No friction rub. No gallop. Pulmonary:      Effort: Pulmonary effort is normal. No respiratory distress. Breath sounds: Normal breath sounds. No stridor. No wheezing, rhonchi or rales. Chest:      Chest wall: No tenderness. Abdominal:      General: Abdomen is flat. Bowel sounds are normal. There is no distension. Palpations: Abdomen is soft. There is no mass. Tenderness: There is no abdominal tenderness. There is no right CVA tenderness, left CVA tenderness, guarding or rebound. Hernia: No hernia is present. Musculoskeletal:         General: No swelling, tenderness, deformity or signs of injury. Normal range of motion. Cervical back: Normal range of motion and neck supple. No rigidity or tenderness. Right lower leg: No edema. Left lower leg: No edema. Comments: gait slow but steady and assisted by rolling walker   Lymphadenopathy:      Cervical: No cervical adenopathy. Skin:     General: Skin is warm. Capillary Refill: Capillary refill takes less than 2 seconds. Coloration: Skin is not jaundiced or pale. Findings: No bruising, erythema, lesion or rash. Neurological:      General: No focal deficit present. Mental Status: She is alert and oriented to person, place, and time. Cranial Nerves: No cranial nerve deficit. Sensory: No sensory deficit. Motor: No weakness. Coordination: Coordination normal.      Gait: Gait (gait slow but steady and assisted by rolling walker) normal.   Psychiatric:         Mood and Affect: Mood normal.         Behavior: Behavior normal.         Thought Content:  Thought content normal.         Judgment: Judgment normal.     Component      Latest Ref Rng & Units 10/27/2022 9/12/2022           2:58 PM  3:23 PM   WBC      4.3 - 11.1 K/uL 8.0 6.3   RBC      4.05 - 5.2 M/uL 3.80 (L) 3.91 (L)   Hemoglobin Quant      11.7 - 15.4 g/dL 11.8 12.3   Hematocrit      35.8 - 46.3 % 36.7 38.4   MCV      82 - 102 FL 96.6 98.2 (H)   MCH      26.1 - 32.9 PG 31.1 31.5   MCHC      31.4 - 35.0 g/dL 32.2 32.0   RDW      11.9 - 14.6 % 11.9 12.6   Platelet Count      309 - 450 K/uL 337 241   MPV      9.4 - 12.3 FL 9.7 10.8   Nucleated Red Blood Cells      0.0 - 0.2 K/uL 0.00 0.00   Differential Type       AUTOMATED AUTOMATED   Seg Neutrophils      43 - 78 % 72 58   Lymphocytes      13 - 44 % 16 28   Monocytes      4.0 - 12.0 % 10 11   Eosinophils %      0.5 - 7.8 % 1 2   Basophils      0.0 - 2.0 % 1 1   Immature Granulocytes      0.0 - 5.0 % 0 0   Segs Absolute      1.7 - 8.2 K/UL 5.7 3.6   Absolute Lymph #      0.5 - 4.6 K/UL 1.3 1.7   Absolute Mono #      0.1 - 1.3 K/UL 0.8 0.7   Absolute Eos #      0.0 - 0.8 K/UL 0.1 0.1   Basophils Absolute      0.0 - 0.2 K/UL 0.1 0.1   Absolute Immature Granulocyte      0.0 - 0.5 K/UL 0.0 0.0     Component      Latest Ref Rng & Units 10/27/2022 9/12/2022           2:58 PM  3:23 PM   Sodium      133 - 143 mmol/L 136 135 (L)   Potassium      3.5 - 5.1 mmol/L 4.6 4.4   Chloride      101 - 110 mmol/L 104 106   CO2      21 - 32 mmol/L 28 25   Anion Gap      2 - 11 mmol/L 4 4   Glucose, Random      65 - 100 mg/dL 108 (H) 101 (H)   BUN,BUNPL      8 - 23 MG/DL 17 19   Creatinine      0.6 - 1.0 MG/DL 0.70 1.00   GFR African American      >60 ml/min/1.73m2  >60   GFR Non-African American      >60 ml/min/1.73m2  55 (L)   CALCIUM, SERUM, 616819      8.3 - 10.4 MG/DL 9.4 9.0   Bilirubin      0.2 - 1.1 MG/DL  0.3   ALT      12 - 65 U/L  18   AST      15 - 37 U/L  15   Alk Phosphatase      50 - 136 U/L  86   Total Protein      6.3 - 8.2 g/dL  6.3   Albumin      3.2 - 4.6 g/dL  4.0   Globulin      2.3 - 3.5 g/dL  2.3   ALBUMIN/GLOBULIN RATIO      1.2 - 3.5    1.7   Est, Glom Filt Rate      >60 ml/min/1.73m2 >60      Component      Latest Ref Rng & Units 10/12/2022           9:08 AM   CHOLESTEROL, TOTAL, [id number]      <200 MG/   Triglycerides      35 - 150 MG/DL 71   HDL Cholesterol      40 - 60 MG/DL 90 (H)   LDL Calculated      <100 MG/DL 55.8   VLDL Cholesterol Calculated      6.0 - 23.0 MG/DL 14.2   Chol/HDL Ratio       1.8     Component      Latest Ref Rng & Units 9/12/2022 9/12/2022           3:23 PM  3:23 PM   T4 Free      0.78 - 1.46 NG/DL  1.1   TSH, 3RD GENERATION      0.358 - 3.740 uIU/mL 2.170      Component      Latest Ref Rng & Units 9/12/2022 9/12/2022           3:23 PM  3:23 PM   Vit D, 25-Hydroxy      30.0 - 100.0 ng/mL 19.1 (L)    Hepatitis C Ab      NONREACTIVE    NONREACTIVE          XR CHEST (2 VW): Patient Communication   Append Comments   Add Notifications     CXR revealed possible patchy pneumonia. Take Doxycycline and other medications as discussed. Keep f/u as scheduled. Call sooner for any new or worsening symptoms. Go to ER for any high fever, trouble breathing, chest pain. Written by KELBY Greenfield NP on 10/27/2022  5:16 PM EDT  Seen by patient Tommie Solitario on 10/27/2022  8:31 PM    Routing History    Priority Sent On From To Message Type    10/27/2022  4:54 PM [place], [facility] Incoming Orders Results To [address], APRN - NP Results     Radiation Dose Estimates    No radiation information found for this patient  Addendum    Signed by Tavo Mesa MD on 10/28/22 9215  Addendum: Addendum: Correction to the body of the report. The airspace    opacity   is in the left lower lung. Narrative   PA AND LATERAL CHEST X-RAY. Clinical Indication: Chronic cough       Comparison: No prior       Findings: 2 views of the chest submitted demonstrate patchy airspace opacity in   the right lower lung concerning for pneumonia. There is no pleural effusion. The   cardiac silhouette and mediastinum are unremarkable. Impression   Left lower lung pneumonia suspected. PLEASE NOTE:  This document has been produced using voice recognition software. Unrecognized errors in transcription may be present. On this date 11/10/2022 I have spent 30 minutes reviewing previous notes, test results and face to face with the patient discussing the diagnosis and importance of compliance with the treatment plan as well as documenting on the day of the visit. An electronic signature was used to authenticate this note.   -- KELBY Greenfield NP

## 2022-11-11 ENCOUNTER — OFFICE VISIT (OUTPATIENT)
Dept: GASTROENTEROLOGY | Age: 87
End: 2022-11-11
Payer: COMMERCIAL

## 2022-11-11 VITALS
HEART RATE: 53 BPM | SYSTOLIC BLOOD PRESSURE: 136 MMHG | HEIGHT: 62 IN | DIASTOLIC BLOOD PRESSURE: 81 MMHG | OXYGEN SATURATION: 94 % | TEMPERATURE: 96.9 F | WEIGHT: 130 LBS | BODY MASS INDEX: 23.92 KG/M2

## 2022-11-11 DIAGNOSIS — K21.9 GERD WITHOUT ESOPHAGITIS: Primary | ICD-10-CM

## 2022-11-11 DIAGNOSIS — J18.9 COMMUNITY ACQUIRED PNEUMONIA OF LEFT LOWER LOBE OF LUNG: Primary | ICD-10-CM

## 2022-11-11 DIAGNOSIS — K52.9 CHRONIC DIARRHEA: ICD-10-CM

## 2022-11-11 PROCEDURE — 1123F ACP DISCUSS/DSCN MKR DOCD: CPT | Performed by: INTERNAL MEDICINE

## 2022-11-11 PROCEDURE — 99203 OFFICE O/P NEW LOW 30 MIN: CPT | Performed by: INTERNAL MEDICINE

## 2022-11-11 RX ORDER — AZITHROMYCIN 250 MG/1
TABLET, FILM COATED ORAL
Qty: 6 TABLET | Refills: 0 | Status: SHIPPED | OUTPATIENT
Start: 2022-11-11

## 2022-11-11 ASSESSMENT — ENCOUNTER SYMPTOMS
DIARRHEA: 1
ABDOMINAL PAIN: 0
COLOR CHANGE: 0
VOMITING: 0
TROUBLE SWALLOWING: 0
NAUSEA: 1
SHORTNESS OF BREATH: 0
BLOOD IN STOOL: 0

## 2022-11-11 NOTE — PROGRESS NOTES
Jake Mc (:  1931) is a 80 y.o. female, new patient here for evaluation of the following chief complaint(s):  Nausea & Vomiting and Heartburn         ASSESSMENT/PLAN:  1. GERD without esophagitis  2. Chronic diarrhea    Instructed the patient to start taking Pepcid AC on an as-needed basis. Since some days she said she does not require or have reflux symptoms. Diet and lifestyle modification were discussed especially early dinner. For her chronic diarrhea she is to continue cholestyramine as the patient like the effect and is happy with her bowel movement. Since there is no bleeding weight loss and the CT scan did not show any mass lesion into the colon, colonoscopy is optional at this moment but the patient is against the idea. Subjective   SUBJECTIVE/OBJECTIVE  Patient present to establish herself she is a pleasant 58-year-old female patient with transplant from [place] she came here to live next to her daughter. She has longstanding history of reflux disease and diarrhea with IBS type on maintained on cholestyramine therapy. This summer she had an episode of acute nausea vomiting discomfort and diarrhea she was seen in the emergency room CT scan was suspicious for the possibility of infectious colitis she was treated with antibiotic. Since then her bowel movements are back to normal as long as she take her cholestyramine she has good solid bowel movement on a daily basis. Her reflux is occasionally not well controlled because she only takes Tums. Denied any dysphagia any vomiting weight loss. She had had prior endoscopic and colonoscopic evaluation in the past stopped after age 79.     Past Medical History:   Diagnosis Date    CAD (coronary artery disease)     Had 4 stents    GERD (gastroesophageal reflux disease)     Has been in ER about 2 mo ago for issues Has issues with dry coughing, vomiting,and bad bowels issues    Hearing loss     Been wearing aids for a long time    Hyperlipidemia     Irritable bowel syndrome     Never professionally diagnosed but thinks she has it    Osteoarthritis Many years    Has it in neck, back and hands    Primary hypertension        Past Surgical History:   Procedure Laterality Date    CARDIAC SURGERY  2008    4 stents    COSMETIC SURGERY  Many    Eyes, and 3 face lifts    HYSTERECTOMY, TOTAL ABDOMINAL (CERVIX REMOVED)  1972    JOINT REPLACEMENT Bilateral 20-30 yrs ago    Knees             Allergies   Allergen Reactions    Doxycycline Itching    Seasonal           Review of Systems   Constitutional:  Negative for appetite change. HENT:  Negative for mouth sores and trouble swallowing. Respiratory:  Negative for shortness of breath. Cardiovascular:  Negative for chest pain. Gastrointestinal:  Positive for diarrhea and nausea. Negative for abdominal pain, blood in stool and vomiting. Skin:  Negative for color change. Allergic/Immunologic: Negative for food allergies. Neurological:  Negative for seizures and weakness. Hematological:  Does not bruise/bleed easily. Objective   Physical Exam  HENT:      Head: Normocephalic. Mouth/Throat:      Mouth: Mucous membranes are moist.   Eyes:      General: No scleral icterus. Cardiovascular:      Rate and Rhythm: Normal rate and regular rhythm. Pulmonary:      Effort: No respiratory distress. Abdominal:      General: There is no distension. Tenderness: There is no abdominal tenderness. There is no rebound. Lymphadenopathy:      Cervical: No cervical adenopathy. Skin:     Coloration: Skin is not jaundiced. Findings: No bruising. Neurological:      General: No focal deficit present. Mental Status: She is alert.             Current Outpatient Medications   Medication Sig Dispense Refill    azithromycin (ZITHROMAX) 250 MG tablet Take 2 Tablets with food by mouth first day, then 1 tab with food by mouth daily for days 2 through 5. 6 tablet 0    ondansetron (ZOFRAN) 4 MG tablet Take 1 tablet by mouth 3 times daily as needed for Nausea 15 tablet 0    benzonatate (TESSALON) 100 MG capsule Take 1 capsule by mouth 3 times daily as needed for Cough 30 capsule 2    amLODIPine (NORVASC) 5 MG tablet Take 1 tablet by mouth daily 90 tablet 3    aspirin EC 81 MG EC tablet Take 1 tablet by mouth daily 90 tablet 1    Cholecalciferol (VITAMIN D3) 125 MCG (5000 UT) CAPS Take 1 capsule by mouth daily 90 capsule 1    simvastatin (ZOCOR) 40 MG tablet TAKE 1 TABLET BY MOUTH IN THE EVENING ONCE A DAY 90 DAYS      valACYclovir (VALTREX) 1 g tablet TAKE 2 TABLETS AT THE ONSET OF THE COLD SORE, AND 2 TABLETS 12 HOURS LATER      naproxen (NAPROSYN) 500 MG tablet Take 500 mg by mouth as needed for Pain      cetirizine (ZYRTEC) 10 MG tablet Take 10 mg by mouth as needed for Allergies      Multiple Vitamins-Minerals (THERAPEUTIC MULTIVITAMIN-MINERALS) tablet Take 1 tablet by mouth daily      cholestyramine (QUESTRAN) 4 g packet Take 1 packet by mouth daily as needed (IBS) (Patient taking differently: Take 1 packet by mouth daily) 90 packet 1     No current facility-administered medications for this visit. Family History   Problem Relation Age of Onset    Alcohol Abuse Father     Alcohol Abuse Brother     Alcohol Abuse Sister     Alcohol Abuse Brother        Return if symptoms worsen or fail to improve. An electronic signature was used to authenticate this note.     --Jaun Raymond MD

## 2022-11-16 DIAGNOSIS — E78.2 MIXED HYPERLIPIDEMIA: Primary | ICD-10-CM

## 2022-11-16 RX ORDER — SIMVASTATIN 40 MG
40 TABLET ORAL NIGHTLY
Qty: 90 TABLET | Refills: 1 | Status: SHIPPED | OUTPATIENT
Start: 2022-11-16 | End: 2022-11-22 | Stop reason: ALTCHOICE

## 2022-11-22 DIAGNOSIS — E78.2 MIXED HYPERLIPIDEMIA: ICD-10-CM

## 2022-11-22 DIAGNOSIS — B37.2 YEAST DERMATITIS: Primary | ICD-10-CM

## 2022-11-22 RX ORDER — EZETIMIBE 10 MG/1
10 TABLET ORAL DAILY
Qty: 90 TABLET | Refills: 1 | Status: SHIPPED | OUTPATIENT
Start: 2022-11-22

## 2022-12-02 ENCOUNTER — TELEPHONE (OUTPATIENT)
Dept: CARDIOLOGY CLINIC | Age: 87
End: 2022-12-02

## 2022-12-02 NOTE — TELEPHONE ENCOUNTER
My chart message from pt. Trent Matthew, I noticed that my moms ankles are swelling in the last week. I think you asked me to let you know if the new medication Amlodipine caused any changes in the legs( I think thats what you said) Anyways, they are in fact swelling. Lmk how to proceed. Pretty   Has no idea what BP is running.

## 2022-12-02 NOTE — TELEPHONE ENCOUNTER
----- [place] [id number] [place] sent at 12/2/2022  4:41 PM EST -----  Regarding: FW: Ankles Swelling     ----- Message -----  From: Alta Moran  Sent: 12/2/2022   4:26 PM EST  To: [facility] Card[facility] Clinical Staff  Subject: Ankles Swelling                                  Hi Tiff, I noticed that my moms ankles are swelling in the last week. I think you asked me to let you know if the new medication Amlodipine caused any changes in the legs( I think thats what you said) Anyways, they are in fact swelling. Lmk how to proceed.  Naya Sanchez

## 2022-12-03 ENCOUNTER — HOSPITAL ENCOUNTER (EMERGENCY)
Age: 87
Discharge: HOME | End: 2022-12-03
Attending: EMERGENCY MEDICINE
Payer: MEDICARE

## 2022-12-03 ENCOUNTER — HOSPITAL ENCOUNTER (EMERGENCY)
Dept: CT IMAGING | Age: 87
End: 2022-12-03
Payer: MEDICARE

## 2022-12-03 ENCOUNTER — HOSPITAL ENCOUNTER (EMERGENCY)
Dept: GENERAL RADIOLOGY | Age: 87
End: 2022-12-03
Payer: MEDICARE

## 2022-12-03 VITALS
TEMPERATURE: 98 F | WEIGHT: 123 LBS | HEIGHT: 62 IN | SYSTOLIC BLOOD PRESSURE: 168 MMHG | RESPIRATION RATE: 17 BRPM | BODY MASS INDEX: 22.63 KG/M2 | OXYGEN SATURATION: 98 % | HEART RATE: 80 BPM | DIASTOLIC BLOOD PRESSURE: 95 MMHG

## 2022-12-03 DIAGNOSIS — S81.812A SKIN TEAR OF LEFT LOWER LEG WITHOUT COMPLICATION, INITIAL ENCOUNTER: ICD-10-CM
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
## 2022-12-03 DIAGNOSIS — S80.812A: ICD-10-CM

## 2022-12-03 DIAGNOSIS — R11.2 NAUSEA AND VOMITING, UNSPECIFIED VOMITING TYPE: ICD-10-CM

## 2022-12-03 LAB
ALBUMIN SERPL-MCNC: 3.9 G/DL (ref 3.2–4.6)
ALBUMIN/GLOB SERPL: 1.2 {RATIO} (ref 0.4–1.6)
ALP SERPL-CCNC: 121 U/L (ref 50–130)
ALT SERPL-CCNC: 34 U/L (ref 12–65)
ANION GAP SERPL CALC-SCNC: 9 MMOL/L (ref 2–11)
APPEARANCE UR: ABNORMAL
AST SERPL-CCNC: 30 U/L (ref 15–37)
BACTERIA URNS QL MICRO: NEGATIVE /HPF
BASOPHILS # BLD: 0 K/UL (ref 0–0.2)
BASOPHILS NFR BLD: 1 % (ref 0–2)
BILIRUB SERPL-MCNC: 0.6 MG/DL (ref 0.2–1.1)
BILIRUB UR QL: NEGATIVE
BUN SERPL-MCNC: 16 MG/DL (ref 8–23)
CALCIUM SERPL-MCNC: 9 MG/DL (ref 8.3–10.4)
CASTS URNS QL MICRO: ABNORMAL /LPF
CHLORIDE SERPL-SCNC: 104 MMOL/L (ref 101–110)
CO2 SERPL-SCNC: 27 MMOL/L (ref 21–32)
COLOR UR: ABNORMAL
CREAT SERPL-MCNC: 0.66 MG/DL (ref 0.6–1)
DIFFERENTIAL METHOD BLD: ABNORMAL
EOSINOPHIL # BLD: 0 K/UL (ref 0–0.8)
EOSINOPHIL NFR BLD: 0 % (ref 0.5–7.8)
EPI CELLS #/AREA URNS HPF: ABNORMAL /HPF
ERYTHROCYTE [DISTWIDTH] IN BLOOD BY AUTOMATED COUNT: 14.1 % (ref 11.9–14.6)
GLOBULIN SER CALC-MCNC: 3.3 G/DL (ref 2.8–4.5)
GLUCOSE SERPL-MCNC: 121 MG/DL (ref 65–100)
GLUCOSE UR STRIP.AUTO-MCNC: NEGATIVE MG/DL
HCT VFR BLD AUTO: 38.4 % (ref 35.8–46.3)
HGB BLD-MCNC: 12.8 G/DL (ref 11.7–15.4)
HGB UR QL STRIP: NEGATIVE
IMM GRANULOCYTES # BLD AUTO: 0 K/UL (ref 0–0.5)
IMM GRANULOCYTES NFR BLD AUTO: 0 % (ref 0–5)
KETONES UR QL STRIP.AUTO: 15 MG/DL
LEUKOCYTE ESTERASE UR QL STRIP.AUTO: ABNORMAL
LIPASE SERPL-CCNC: 113 U/L (ref 73–393)
LYMPHOCYTES # BLD: 1 K/UL (ref 0.5–4.6)
LYMPHOCYTES NFR BLD: 14 % (ref 13–44)
MAGNESIUM SERPL-MCNC: 2.2 MG/DL (ref 1.8–2.4)
MCH RBC QN AUTO: 30.7 PG (ref 26.1–32.9)
MCHC RBC AUTO-ENTMCNC: 33.3 G/DL (ref 31.4–35)
MCV RBC AUTO: 92.1 FL (ref 82–102)
MONOCYTES # BLD: 0.4 K/UL (ref 0.1–1.3)
MONOCYTES NFR BLD: 5 % (ref 4–12)
NEUTS SEG # BLD: 5.7 K/UL (ref 1.7–8.2)
NEUTS SEG NFR BLD: 79 % (ref 43–78)
NITRITE UR QL STRIP.AUTO: NEGATIVE
NRBC # BLD: 0 K/UL (ref 0–0.2)
PH UR STRIP: 8 [PH] (ref 5–9)
PLATELET # BLD AUTO: 245 K/UL (ref 150–450)
PMV BLD AUTO: 9 FL (ref 9.4–12.3)
POTASSIUM SERPL-SCNC: 3.4 MMOL/L (ref 3.5–5.1)
PROT SERPL-MCNC: 7.2 G/DL (ref 6.3–8.2)
PROT UR STRIP-MCNC: NEGATIVE MG/DL
RBC # BLD AUTO: 4.17 M/UL (ref 4.05–5.2)
RBC #/AREA URNS HPF: ABNORMAL /HPF
SODIUM SERPL-SCNC: 140 MMOL/L (ref 133–143)
SP GR UR REFRACTOMETRY: 1.01 (ref 1–1.02)
UROBILINOGEN UR QL STRIP.AUTO: 0.2 EU/DL (ref 0.2–1)
WBC # BLD AUTO: 7.2 K/UL (ref 4.3–11.1)
WBC URNS QL MICRO: ABNORMAL /HPF

## 2022-12-03 PROCEDURE — 72072 X-RAY EXAM THORAC SPINE 3VWS: CPT

## 2022-12-03 PROCEDURE — 80053 COMPREHEN METABOLIC PANEL: CPT

## 2022-12-03 PROCEDURE — 73562 X-RAY EXAM OF KNEE 3: CPT

## 2022-12-03 PROCEDURE — 96374 THER/PROPH/DIAG INJ IV PUSH: CPT

## 2022-12-03 PROCEDURE — 72125 CT NECK SPINE W/O DYE: CPT

## 2022-12-03 PROCEDURE — 81001 URINALYSIS AUTO W/SCOPE: CPT

## 2022-12-03 PROCEDURE — 83735 ASSAY OF MAGNESIUM: CPT

## 2022-12-03 PROCEDURE — A4216 STERILE WATER/SALINE, 10 ML: HCPCS | Performed by: EMERGENCY MEDICINE

## 2022-12-03 PROCEDURE — 96375 TX/PRO/DX INJ NEW DRUG ADDON: CPT

## 2022-12-03 PROCEDURE — 83690 ASSAY OF LIPASE: CPT

## 2022-12-03 PROCEDURE — 74022 RADEX COMPL AQT ABD SERIES: CPT

## 2022-12-03 PROCEDURE — 73590 X-RAY EXAM OF LOWER LEG: CPT

## 2022-12-03 PROCEDURE — C9113 INJ PANTOPRAZOLE SODIUM, VIA: HCPCS | Performed by: EMERGENCY MEDICINE

## 2022-12-03 PROCEDURE — 85025 COMPLETE CBC W/AUTO DIFF WBC: CPT

## 2022-12-03 PROCEDURE — 2580000003 HC RX 258: Performed by: EMERGENCY MEDICINE

## 2022-12-03 PROCEDURE — 99284 EMERGENCY DEPT VISIT MOD MDM: CPT

## 2022-12-03 PROCEDURE — 70450 CT HEAD/BRAIN W/O DYE: CPT

## 2022-12-03 PROCEDURE — 6360000002 HC RX W HCPCS: Performed by: EMERGENCY MEDICINE

## 2022-12-03 RX ORDER — FAMOTIDINE 20 MG/1
20 TABLET, FILM COATED ORAL 2 TIMES DAILY
Qty: 20 TABLET | Refills: 0 | Status: SHIPPED | OUTPATIENT
Start: 2022-12-03

## 2022-12-03 RX ORDER — ONDANSETRON 2 MG/ML
4 INJECTION INTRAMUSCULAR; INTRAVENOUS
Status: COMPLETED | OUTPATIENT
Start: 2022-12-03 | End: 2022-12-03

## 2022-12-03 RX ORDER — FAMOTIDINE 20 MG/1
20 TABLET, FILM COATED ORAL 2 TIMES DAILY
Qty: 20 TABLET | Refills: 0 | Status: SHIPPED | OUTPATIENT
Start: 2022-12-03 | End: 2022-12-03 | Stop reason: SDUPTHER

## 2022-12-03 RX ORDER — ONDANSETRON 4 MG/1
4 TABLET, ORALLY DISINTEGRATING ORAL 4 TIMES DAILY PRN
Qty: 21 TABLET | Refills: 0 | Status: SHIPPED | OUTPATIENT
Start: 2022-12-03

## 2022-12-03 RX ORDER — 0.9 % SODIUM CHLORIDE 0.9 %
1000 INTRAVENOUS SOLUTION INTRAVENOUS ONCE
Status: COMPLETED | OUTPATIENT
Start: 2022-12-03 | End: 2022-12-03

## 2022-12-03 RX ORDER — ONDANSETRON 4 MG/1
4 TABLET, ORALLY DISINTEGRATING ORAL 4 TIMES DAILY PRN
Qty: 21 TABLET | Refills: 0 | Status: SHIPPED | OUTPATIENT
Start: 2022-12-03 | End: 2022-12-03 | Stop reason: SDUPTHER

## 2022-12-03 RX ADMIN — SODIUM CHLORIDE 1000 ML: 900 INJECTION, SOLUTION INTRAVENOUS at 15:07

## 2022-12-03 RX ADMIN — ONDANSETRON 4 MG: 2 INJECTION INTRAMUSCULAR; INTRAVENOUS at 14:49

## 2022-12-03 RX ADMIN — SODIUM CHLORIDE 40 MG: 9 INJECTION, SOLUTION INTRAMUSCULAR; INTRAVENOUS; SUBCUTANEOUS at 14:49

## 2022-12-03 ASSESSMENT — ENCOUNTER SYMPTOMS
EYE PAIN: 0
WHEEZING: 0
ABDOMINAL PAIN: 0
COUGH: 0
BACK PAIN: 1
TROUBLE SWALLOWING: 0
CONSTIPATION: 0
VOMITING: 1
SHORTNESS OF BREATH: 0
EYE REDNESS: 0
EYE ITCHING: 0
VISUAL CHANGE: 0
DIARRHEA: 0
NAUSEA: 1
SINUS PAIN: 0

## 2022-12-03 ASSESSMENT — PAIN - FUNCTIONAL ASSESSMENT
PAIN_FUNCTIONAL_ASSESSMENT: 0-10
PAIN_FUNCTIONAL_ASSESSMENT: NONE - DENIES PAIN

## 2022-12-03 ASSESSMENT — PAIN SCALES - GENERAL: PAINLEVEL_OUTOF10: 0

## 2022-12-03 NOTE — ED TRIAGE NOTES
Per ems report , pt fell after walker brakes came lose, pt has lac to left leg and, reddened area on back  Pt had 3 episode of vomiting today

## 2022-12-03 NOTE — DISCHARGE INSTRUCTIONS
Continue all your current medications  Use Zofran as needed for episodes of nausea  Be sure to drink plenty of clear liquids  Apply Neosporin and clean dressings to the left leg wounds once a day    Be sure to exile your evil walker to an appropriate dumpster     Use your new walker whenever you are up and moving around    Call your doctor or the follow up doctor to set up appointment for recheck visit    Return to ER for any worsening symptoms or new problems which may arise

## 2022-12-03 NOTE — ED PROVIDER NOTES
Emergency Department Provider Note                   PCP:                KELBY Parish NP               Age: 80 y.o. Sex: female       ICD-10-CM    1. Fall, initial encounter  [address] XXXA       2. Skin tear of left lower leg without complication, initial encounter  S81.812A       3. Calf abrasion, left, initial encounter  S80.812A       4. Nausea and vomiting, unspecified vomiting type  R11.2           DISPOSITION Decision To Discharge 12/03/2022 03:41:16 PM       MDM  Number of Diagnoses or Management Options  Calf abrasion, left, initial encounter: new, needed workup  Fall, initial encounter: new, needed workup  Nausea and vomiting, unspecified vomiting type: new, needed workup  Skin tear of left lower leg without complication, initial encounter: new, needed workup  Diagnosis management comments: 66-year-old female patient presents after a fall last night  Has skin tear and abrasions to her left lower extremity and a area of erythema on her upper back which is linear  Difficult to tell whether or not this could be early shingles as the patient does complain of some itching to the area. Family members are fairly consistent in their assessment that it was related to the trauma from the fall last evening  Patient is also had 2-3 episodes of nausea vomiting today  Patient reports she has had \"stomach issues\"  We will check her labs including a lipase today we will need to check her urine as well.   Will check CT of her head and neck plain films of her thoracic spine and plain films of the left lower extremity  Family reports that the patient's last tetanus shot was approximately 1 year ago    3:47 PM  Work-up today reveals no evidence of other acute injuries  Arthritic changes on imaging  Lab work is normal    Patient will be discharged home with Zofran and Pepcid for her nausea and vomiting  Advised close primary care follow-up  Fall and walker precautions discussed       Amount and/or Complexity of Data Reviewed  Clinical lab tests: ordered and reviewed  Tests in the radiology section of CPT®: ordered and reviewed  Tests in the medicine section of CPT®: ordered and reviewed  Decide to obtain previous medical records or to obtain history from someone other than the patient: yes  Review and summarize past medical records: yes  Independent visualization of images, tracings, or specimens: yes    Risk of Complications, Morbidity, and/or Mortality  Presenting problems: moderate  Diagnostic procedures: moderate  Management options: moderate  General comments: Elements of this note have been dictated via voice recognition software. Text and phrases may be limited by the accuracy of the software. The chart has been reviewed, but errors may still be present.         Patient Progress  Patient progress: improved             Orders Placed This Encounter   Procedures    CT HEAD WO CONTRAST    CT CERVICAL SPINE WO CONTRAST    XR THORACIC SPINE (3 VIEWS)    XR KNEE LEFT (3 VIEWS)    XR TIBIA FIBULA LEFT (2 VIEWS)    XR ACUTE ABD SERIES CHEST 1 VW    CBC with Auto Differential    Comprehensive Metabolic Panel    Lipase    Magnesium    Urinalysis w rflx microscopic    Continuous Pulse Oximetry    POCT Urine Dipstick    Apply dressing        Medications   0.9 % sodium chloride bolus (1,000 mLs IntraVENous New Bag 12/3/22 1507)   pantoprazole (PROTONIX) 40 mg in sodium chloride (PF) 0.9 % 10 mL injection (40 mg IntraVENous Given 12/3/22 1449)   ondansetron (ZOFRAN) injection 4 mg (4 mg IntraVENous Given 12/3/22 1449)       Current Discharge Medication List        START taking these medications    Details   famotidine (PEPCID) 20 MG tablet Take 1 tablet by mouth 2 times daily  Qty: 20 tablet, Refills: 0      ondansetron (ZOFRAN-ODT) 4 MG disintegrating tablet Take 1 tablet by mouth 4 times daily as needed for Nausea or Vomiting  Qty: 21 tablet, Refills: 0              Laura Devlin is a 80 y.o. female who presents to the Emergency Department with chief complaint of    Chief Complaint   Patient presents with    Fall    Emesis      29-year-old female patient presents from her care facility after a fall  Patient reports that last night her walker \"ran away from me and I ran after it\"  Patient reports that she fell and in process fell into the walker and sustained several wounds to her left lower extremity  She does have some upper back pain  She denies hitting her head  Has also complained of some nausea and vomiting over the last 24 hours    The history is provided by the patient and a relative. Fall  The accident occurred 12 to 24 hours ago. The fall occurred while walking. She landed on A hard floor. The volume of blood lost was minimal. Point of impact: Left knee and left lower leg. Pain location: Left knee and left lower leg. The pain is moderate. She was Ambulatory at the scene. There was No entrapment after the fall. There was No drug use involved in the accident. There was No alcohol use involved in the accident. Associated symptoms include nausea and vomiting. Pertinent negatives include no visual change, no fever, no abdominal pain, no hematuria, no headaches and no tingling. The symptoms are aggravated by use of the injured limb. Prehospitalization: Family applied bandages to the open wounds. Emesis  This is a new problem. The current episode started 3 to 5 hours ago. The problem occurs rarely. The problem has been gradually improving. Pertinent negatives include no chest pain, no abdominal pain, no headaches and no shortness of breath. The symptoms are aggravated by walking and standing. Nothing relieves the symptoms. She has tried nothing for the symptoms. All other systems reviewed and are negative unless otherwise stated in the history of present illness section. Review of Systems   Constitutional:  Negative for chills, fatigue and fever.    HENT:  Negative for ear pain, hearing loss, sinus pain, sneezing and trouble swallowing. Eyes:  Negative for pain, redness and itching. Respiratory:  Negative for cough, shortness of breath and wheezing. Cardiovascular:  Negative for chest pain and palpitations. Gastrointestinal:  Positive for nausea and vomiting. Negative for abdominal pain, constipation and diarrhea. Endocrine: Negative for polydipsia, polyphagia and polyuria. Genitourinary:  Negative for dysuria, flank pain, frequency and hematuria. Musculoskeletal:  Positive for arthralgias and back pain. Negative for myalgias. Skin:  Negative for rash and wound. Allergic/Immunologic: Negative for food allergies and immunocompromised state. Neurological:  Negative for dizziness, tingling, syncope, speech difficulty, light-headedness and headaches. Hematological:  Negative for adenopathy. Does not bruise/bleed easily. Psychiatric/Behavioral:  Negative for dysphoric mood and self-injury. The patient is not nervous/anxious. All other systems reviewed and are negative.     Past Medical History:   Diagnosis Date    CAD (coronary artery disease) 2008    Had 4 stents    GERD (gastroesophageal reflux disease)     Has been in ER about 2 mo ago for issues Has issues with dry coughing, vomiting,and bad bowels issues    Hearing loss     Been wearing aids for a long time    Hyperlipidemia     Irritable bowel syndrome     Never professionally diagnosed but thinks she has it    Osteoarthritis Many years    Has it in neck, back and hands    Primary hypertension         Past Surgical History:   Procedure Laterality Date    CARDIAC SURGERY  2008    4 stents    COSMETIC SURGERY  Many    Eyes, and 3 face lifts    HYSTERECTOMY, TOTAL ABDOMINAL (CERVIX REMOVED)  1972    JOINT REPLACEMENT Bilateral 20-30 yrs ago    Knees        Family History   Problem Relation Age of Onset    Alcohol Abuse Father     Alcohol Abuse Brother     Alcohol Abuse Sister     Alcohol Abuse Brother         Social History     Socioeconomic History Marital status:    Tobacco Use    Smoking status: Never    Smokeless tobacco: Never   Substance and Sexual Activity    Alcohol use: Yes     Alcohol/week: 14.0 standard drinks     Types: 14 Glasses of wine per week     Comment: Has had a history of bad drinking    Drug use: Never    Sexual activity: Not Currently     Social Determinants of Health     Physical Activity: Inactive    Days of Exercise per Week: 0 days    Minutes of Exercise per Session: 0 min   Intimate Partner Violence: Not At Risk    Fear of Current or Ex-Partner: No    Emotionally Abused: No    Physically Abused: No    Sexually Abused: No        Allergies: Doxycycline and Seasonal    Current Discharge Medication List        CONTINUE these medications which have NOT CHANGED    Details   nystatin-triamcinolone (MYCOLOG II) 166288-8.1 UNIT/GM-% cream Apply to affected area twice a day for 7 days  Qty: 30 g, Refills: 0    Associated Diagnoses: Yeast dermatitis      ezetimibe (ZETIA) 10 MG tablet Take 1 tablet by mouth daily  Qty: 90 tablet, Refills: 1    Associated Diagnoses: Mixed hyperlipidemia      azithromycin (ZITHROMAX) 250 MG tablet Take 2 Tablets with food by mouth first day, then 1 tab with food by mouth daily for days 2 through 5.   Qty: 6 tablet, Refills: 0    Associated Diagnoses: Community acquired pneumonia of left lower lobe of lung      benzonatate (TESSALON) 100 MG capsule Take 1 capsule by mouth 3 times daily as needed for Cough  Qty: 30 capsule, Refills: 2    Associated Diagnoses: Chronic cough      amLODIPine (NORVASC) 5 MG tablet Take 1 tablet by mouth daily  Qty: 90 tablet, Refills: 3      aspirin EC 81 MG EC tablet Take 1 tablet by mouth daily  Qty: 90 tablet, Refills: 1      cholestyramine (QUESTRAN) 4 g packet Take 1 packet by mouth daily as needed (IBS)  Qty: 90 packet, Refills: 1      Cholecalciferol (VITAMIN D3) 125 MCG (5000 UT) CAPS Take 1 capsule by mouth daily  Qty: 90 capsule, Refills: 1    Associated Diagnoses: Vitamin D deficiency      valACYclovir (VALTREX) 1 g tablet TAKE 2 TABLETS AT THE ONSET OF THE COLD SORE, AND 2 TABLETS 12 HOURS LATER      naproxen (NAPROSYN) 500 MG tablet Take 500 mg by mouth as needed for Pain      cetirizine (ZYRTEC) 10 MG tablet Take 10 mg by mouth as needed for Allergies      Multiple Vitamins-Minerals (THERAPEUTIC MULTIVITAMIN-MINERALS) tablet Take 1 tablet by mouth daily              Vitals signs and nursing note reviewed. Patient Vitals for the past 4 hrs:   Temp Pulse Resp BP SpO2   12/03/22 1310 98 °F (36.7 °C) 84 18 (!) 171/100 98 %          Physical Exam  Vitals and nursing note reviewed. Constitutional:       General: She is in acute distress. Appearance: Normal appearance. She is normal weight. HENT:      Head: Normocephalic and atraumatic. Right Ear: External ear normal.      Left Ear: External ear normal.      Nose: Nose normal.      Mouth/Throat:      Mouth: Mucous membranes are moist.      Pharynx: Oropharynx is clear. Eyes:      General: No scleral icterus. Extraocular Movements: Extraocular movements intact. Conjunctiva/sclera: Conjunctivae normal.      Pupils: Pupils are equal, round, and reactive to light. Cardiovascular:      Rate and Rhythm: Normal rate and regular rhythm. Pulses: Normal pulses. Heart sounds: Normal heart sounds. Pulmonary:      Effort: Pulmonary effort is normal. No respiratory distress. Breath sounds: Normal breath sounds. Abdominal:      General: Abdomen is flat. Bowel sounds are normal. There is no distension. Palpations: Abdomen is soft. There is no mass. Tenderness: There is no abdominal tenderness. Musculoskeletal:         General: No deformity or signs of injury. Normal range of motion. Cervical back: Normal range of motion and neck supple. Skin:     General: Skin is warm and dry. Capillary Refill: Capillary refill takes less than 2 seconds.       Comments: Skin tear to the left shin    Bruising and superficial abrasion noted to the posterior aspect of the upper calf and left knee    Right upper back reveals a linear band of erythema. Macular  I do not see any evidence of any vesicular lesions   Neurological:      General: No focal deficit present. Mental Status: She is alert and oriented to person, place, and time. Psychiatric:         Mood and Affect: Mood normal.         Behavior: Behavior normal.         Thought Content: Thought content normal.         Judgment: Judgment normal.        Procedures    ED EKG Interpretation  EKG was interpreted in the absence of a cardiologist.        Results for orders placed or performed during the hospital encounter of 12/03/22   CT HEAD WO CONTRAST    Narrative    Head CT    INDICATION: Fall with blunt head injury. Multiple axial images obtained through the brain without intravenous contrast.   Radiation dose reduction techniques were used for this study: All CT scans  performed at this facility use one or all of the following: Automated exposure  control, adjustment of the mA and/or kVp according to patient's size, iterative  reconstruction. FINDINGS: Periventricular white matter hypodensities are present likely  reflecting chronic microvascular ischemic change. There is no CT evidence of  acute hemorrhage or infarction. The ventricles appear prominent in size of  proportion to the amount of cerebral parenchymal volume loss possibly indicating  mild hydrocephalus. As the lateral and third ventricles. There are no  extra-axial fluid collections. No masses are seen. The sinuses are clear. There  are no bony lesions. Impression    1. No acute intracranial abnormality. Probable chronic microvascular ischemic  change. 2. Prominence of the lateral ventricles and third ventricle out of proportion to  the generalized cerebral parenchymal volume loss. Hydrocephalus is possible.      CT CERVICAL SPINE WO CONTRAST    Narrative    CT CERVICAL SPINE WO CONTRAST 12/3/2022 2:45 PM     HISTORY: Fall with blunt head injury. Neck pain. COMPARISON: None. Multiple axial images were obtained through the cervical spine without  intravenous contrast. Coronal and sagittal reformatted images were also  reviewed. Radiation dose reduction techniques were used for this study: All CT  scans performed at this facility use one or all of the following: Automated  exposure control, adjustment of the mA and/or kVp according to patient's size,  iterative reconstruction. FINDINGS:   There is no evidence of fracture or subluxation. No bony lesions are seen. There is no prevertebral soft tissue swelling. Multilevel degenerative disc  changes noted throughout the cervical spine with reversed affected levels at  C5-6 and C6-7. Degenerative bilateral facet changes also noted. Impression    No evidence of cervical spine fracture or malalignment. Multilevel  degenerative cervical spine changes. XR THORACIC SPINE (3 VIEWS)    Narrative    Thoracic spine    CLINICAL INDICATION: Back pain after a fall. FINDINGS: Three views of the thoracic spine compared to a prior CT of the  abdomen and pelvis again shows compression fracture deformities in the upper  lumbar spine at L1 and L2. There is slight loss vertebral body height at T8. The  remainder of the vertebral bodies are normal in height and alignment. The disc  spaces are maintained. The paraspinal soft tissues are unremarkable. Impression    1. Age indeterminate 25% compression fracture deformity at T8.  2. Old compression fracture deformities at L1 and L2. XR KNEE LEFT (3 VIEWS)    Narrative    Left knee and left tibia/fibula    Clinical indications: Left knee and leg pain after a fall    FINDINGS:    Left knee: Three views of the left knee show the medial compartment arthroplasty  hardware to be intact. There is no fracture. There is no joint effusion. The  joint spaces are maintained.     Left tibia-fibula: Four views submitted. The tibia and fibula are intact. There  is no fracture. Soft tissues are unremarkable. Impression    1. No acute fracture or joint derangement of the left knee. 2. No fracture of the left tibia and fibula   XR TIBIA FIBULA LEFT (2 VIEWS)    Narrative    Left knee and left tibia/fibula    Clinical indications: Left knee and leg pain after a fall    FINDINGS:    Left knee: Three views of the left knee show the medial compartment arthroplasty  hardware to be intact. There is no fracture. There is no joint effusion. The  joint spaces are maintained. Left tibia-fibula: Four views submitted. The tibia and fibula are intact. There  is no fracture. Soft tissues are unremarkable. Impression    1. No acute fracture or joint derangement of the left knee. 2. No fracture of the left tibia and fibula   XR ACUTE ABD SERIES CHEST 1 VW    Narrative    Acute abdominal series    CLINICAL INDICATION: Abdominal pain after a fall vomiting    FINDINGS: The cardiopulmonary structures are unremarkable. The bowel gas pattern  is normal. No dilated loops of bowel present to suspect obstruction. No dilated  gastric bubble evident. No abnormal calcifications present. Advanced  degenerative OA changes noted at the hip joints. Impression    1. No acute abdominal abnormality. 2. Degenerative osteoarthritis of the bilateral hips.    CBC with Auto Differential   Result Value Ref Range    WBC 7.2 4.3 - 11.1 K/uL    RBC 4.17 4.05 - 5.2 M/uL    Hemoglobin 12.8 11.7 - 15.4 g/dL    Hematocrit 38.4 35.8 - 46.3 %    MCV 92.1 82.0 - 102.0 FL    MCH 30.7 26.1 - 32.9 PG    MCHC 33.3 31.4 - 35.0 g/dL    RDW 14.1 11.9 - 14.6 %    Platelets 749 850 - 576 K/uL    MPV 9.0 (L) 9.4 - 12.3 FL    nRBC 0.00 0.0 - 0.2 K/uL    Differential Type AUTOMATED      Seg Neutrophils 79 (H) 43 - 78 %    Lymphocytes 14 13 - 44 %    Monocytes 5 4.0 - 12.0 %    Eosinophils % 0 (L) 0.5 - 7.8 %    Basophils 1 0.0 - 2.0 % Immature Granulocytes 0 0.0 - 5.0 %    Segs Absolute 5.7 1.7 - 8.2 K/UL    Absolute Lymph # 1.0 0.5 - 4.6 K/UL    Absolute Mono # 0.4 0.1 - 1.3 K/UL    Absolute Eos # 0.0 0.0 - 0.8 K/UL    Basophils Absolute 0.0 0.0 - 0.2 K/UL    Absolute Immature Granulocyte 0.0 0.0 - 0.5 K/UL   Comprehensive Metabolic Panel   Result Value Ref Range    Sodium 140 133 - 143 mmol/L    Potassium 3.4 (L) 3.5 - 5.1 mmol/L    Chloride 104 101 - 110 mmol/L    CO2 27 21 - 32 mmol/L    Anion Gap 9 2 - 11 mmol/L    Glucose 121 (H) 65 - 100 mg/dL    BUN 16 8 - 23 MG/DL    Creatinine 0.66 0.6 - 1.0 MG/DL    Est, Glom Filt Rate >60 >60 ml/min/1.73m2    Calcium 9.0 8.3 - 10.4 MG/DL    Total Bilirubin 0.6 0.2 - 1.1 MG/DL    ALT 34 12 - 65 U/L    AST 30 15 - 37 U/L    Alk Phosphatase 121 50 - 130 U/L    Total Protein 7.2 6.3 - 8.2 g/dL    Albumin 3.9 3.2 - 4.6 g/dL    Globulin 3.3 2.8 - 4.5 g/dL    Albumin/Globulin Ratio 1.2 0.4 - 1.6     Lipase   Result Value Ref Range    Lipase 113 73 - 393 U/L   Magnesium   Result Value Ref Range    Magnesium 2.2 1.8 - 2.4 mg/dL   Urinalysis w rflx microscopic   Result Value Ref Range    Color, UA YELLOW/STRAW      Appearance CLOUDY      Specific [place], UA 1.011 1.001 - 1.023      pH, Urine 8.0 5.0 - 9.0      Protein, UA Negative NEG mg/dL    Glucose, UA Negative mg/dL    Ketones, Urine 15 (A) NEG mg/dL    Bilirubin Urine Negative NEG      Blood, Urine Negative NEG      Urobilinogen, Urine 0.2 0.2 - 1.0 EU/dL    Nitrite, Urine Negative NEG      Leukocyte Esterase, Urine TRACE (A) NEG      WBC, UA 10-20 (A) U4 /hpf    RBC, UA 0-5 U5 /hpf    Epithelial Cells UA 0-5 U5 /hpf    BACTERIA, URINE Negative NEG /hpf    Casts 0-2 U2 /lpf        CT HEAD WO CONTRAST   Final Result   1. No acute intracranial abnormality. Probable chronic microvascular ischemic   change. 2. Prominence of the lateral ventricles and third ventricle out of proportion to   the generalized cerebral parenchymal volume loss.  Hydrocephalus is possible. CT CERVICAL SPINE WO CONTRAST   Final Result   No evidence of cervical spine fracture or malalignment. Multilevel   degenerative cervical spine changes. XR THORACIC SPINE (3 VIEWS)   Final Result   1. Age indeterminate 25% compression fracture deformity at T8.   2. Old compression fracture deformities at L1 and L2. XR KNEE LEFT (3 VIEWS)   Final Result   1. No acute fracture or joint derangement of the left knee. 2. No fracture of the left tibia and fibula      XR TIBIA FIBULA LEFT (2 VIEWS)   Final Result   1. No acute fracture or joint derangement of the left knee. 2. No fracture of the left tibia and fibula      XR ACUTE ABD SERIES CHEST 1 VW   Final Result   1. No acute abdominal abnormality. 2. Degenerative osteoarthritis of the bilateral hips. Voice dictation software was used during the making of this note. This software is not perfect and grammatical and other typographical errors may be present. This note has not been completely proofread for errors.      Simran Perdomo MD  12/03/22 [id number]

## 2022-12-03 NOTE — ED NOTES
I have reviewed discharge instructions with the patient and caregiver. The patient and caregiver verbalized understanding. Patient left ED via Discharge Method: wheelchair to Home with family. Opportunity for questions and clarification provided. Patient given 2 scripts. No e-sign. To continue your aftercare when you leave the hospital, you may receive an automated call from our care team to check in on how you are doing. This is a free service and part of our promise to provide the best care and service to meet your aftercare needs.  If you have questions, or wish to unsubscribe from this service please call [phone]. Thank you for Choosing our [facility] Emergency Department.          Ana Paula Wallace RN  12/03/22 [id number]

## 2022-12-05 ENCOUNTER — PATIENT MESSAGE (OUTPATIENT)
Dept: CARDIOLOGY CLINIC | Age: 87
End: 2022-12-05

## 2022-12-05 RX ORDER — GREEN TEA/HOODIA GORDONII 315-12.5MG
1 CAPSULE ORAL DAILY
Qty: 30 TABLET | Refills: 0 | Status: SHIPPED | OUTPATIENT
Start: 2022-12-05 | End: 2023-01-04

## 2022-12-05 RX ORDER — CIPROFLOXACIN 250 MG/1
250 TABLET, FILM COATED ORAL 2 TIMES DAILY
Qty: 10 TABLET | Refills: 0 | Status: SHIPPED | OUTPATIENT
Start: 2022-12-05 | End: 2022-12-10

## 2022-12-05 RX ORDER — LISINOPRIL 10 MG/1
TABLET ORAL
Qty: 30 TABLET | Refills: 11 | Status: SHIPPED | OUTPATIENT
Start: 2022-12-05

## 2022-12-05 NOTE — TELEPHONE ENCOUNTER
Dimple Jackson MD  You 6 minutes ago (8:58 AM)     BM  We must tread carefully given that over medication in a nonagenarian may create more circumstances that would risk falling. BP in office was 138/66, we stopped lisinopril to see if cough would improve. If it hasn't by now, would resume previous dos e lisinopril. If it has improved, begin 25 mg losartan instead and keep use posted. BP up in ER not surprising with pain/anxiety, etc.  If we can keep her generally 150's/90's or below that would be a success.   thanks

## 2022-12-05 NOTE — TELEPHONE ENCOUNTER
[facility], so if it's not severe wouldn't change anything, just elevate the legs when sitting, low Na diet, etc.  If its severe and painful, we will need to stop it and find something else.

## 2022-12-05 NOTE — TELEPHONE ENCOUNTER
Pt.fell Friday night and was evaluated in [facility] ER.BP was elevated in -190/ 100's at home 156/91.pT.HAD N/V. Daughter not sure cause of the fall. Pt.claims her walker got away from her. .No med changes made in the ER. However she was told to call here about elevated BP. Daughter does not feel the swelling in the feet caused the fall. Daughter now concerned about the BP. Swelling in feet is not that bad. Current Outpatient Medications on File Prior to Visit   Medication Sig Dispense Refill    nystatin-triamcinolone (MYCOLOG II) 807890-3.1 UNIT/GM-% cream Apply to affected area twice a day for 7 days 30 g 0    ezetimibe (ZETIA) 10 MG tablet Take 1 tablet by mouth daily 90 tablet 1    azithromycin (ZITHROMAX) 250 MG tablet Take 2 Tablets with food by mouth first day, then 1 tab with food by mouth daily for days 2 through 5. 6 tablet 0    benzonatate (TESSALON) 100 MG capsule Take 1 capsule by mouth 3 times daily as needed for Cough 30 capsule 2    amLODIPine (NORVASC) 5 MG tablet Take 1 tablet by mouth daily 90 tablet 3    aspirin EC 81 MG EC tablet Take 1 tablet by mouth daily 90 tablet 1    cholestyramine (QUESTRAN) 4 g packet Take 1 packet by mouth daily as needed (IBS) (Patient taking differently: Take 1 packet by mouth daily) 90 packet 1    Cholecalciferol (VITAMIN D3) 125 MCG (5000 UT) CAPS Take 1 capsule by mouth daily 90 capsule 1    valACYclovir (VALTREX) 1 g tablet TAKE 2 TABLETS AT THE ONSET OF THE COLD SORE, AND 2 TABLETS 12 HOURS LATER      naproxen (NAPROSYN) 500 MG tablet Take 500 mg by mouth as needed for Pain      cetirizine (ZYRTEC) 10 MG tablet Take 10 mg by mouth as needed for Allergies      Multiple Vitamins-Minerals (THERAPEUTIC MULTIVITAMIN-MINERALS) tablet Take 1 tablet by mouth daily       No current facility-administered medications on file prior to visit.

## 2022-12-05 NOTE — TELEPHONE ENCOUNTER
I called daughter back however she does not know if cough is gone . Daughter will discuss w/pt. and send another [facility]. So these are answers to Miguels questions we discussed   Maddie Escamilla said her cough is gone (if you see her My Chart she also had patchy pneumonia during that same time. She was treated for that. Her ankles are not swollen this am. She just got up and has been off of them. Her BP this am is 144/94 pulse 77. She hasnt taken her BP meds yet and Ill wait to hear from you . Thanks. Pretty       I told daughter to have pt. resume her Lisinopril. Med escribed as below  Requested Prescriptions     Signed Prescriptions Disp Refills    lisinopril (PRINIVIL;ZESTRIL) 10 MG tablet 30 tablet 11     Sig: TAKE 1 TABLET BY MOUTH DAILY FOR 90 DAYS     Authorizing Provider: Nida URRUTIA     Ordering User: MADELEINE LARSON

## 2022-12-08 ENCOUNTER — HOSPITAL ENCOUNTER (EMERGENCY)
Age: 87
Discharge: HOME | End: 2022-12-08
Attending: EMERGENCY MEDICINE
Payer: MEDICARE

## 2022-12-08 ENCOUNTER — CLINICAL DOCUMENTATION (OUTPATIENT)
Dept: FAMILY MEDICINE CLINIC | Facility: CLINIC | Age: 87
End: 2022-12-08

## 2022-12-08 ENCOUNTER — HOSPITAL ENCOUNTER (EMERGENCY)
Dept: CT IMAGING | Age: 87
End: 2022-12-08
Payer: MEDICARE

## 2022-12-08 VITALS
WEIGHT: 123 LBS | HEART RATE: 76 BPM | SYSTOLIC BLOOD PRESSURE: 132 MMHG | RESPIRATION RATE: 18 BRPM | OXYGEN SATURATION: 100 % | TEMPERATURE: 98.4 F | HEIGHT: 62 IN | DIASTOLIC BLOOD PRESSURE: 74 MMHG | BODY MASS INDEX: 22.63 KG/M2

## 2022-12-08 DIAGNOSIS — N39.0 URINARY TRACT INFECTION IN FEMALE: ICD-10-CM

## 2022-12-08 DIAGNOSIS — R11.2 NAUSEA AND VOMITING, UNSPECIFIED VOMITING TYPE: ICD-10-CM

## 2022-12-08 DIAGNOSIS — E86.0 DEHYDRATION: Primary | ICD-10-CM

## 2022-12-08 LAB
ALBUMIN SERPL-MCNC: 3.7 G/DL (ref 3.2–4.6)
ALBUMIN/GLOB SERPL: 1.3 {RATIO} (ref 0.4–1.6)
ALP SERPL-CCNC: 98 U/L (ref 50–130)
ALT SERPL-CCNC: 64 U/L (ref 12–65)
ANION GAP SERPL CALC-SCNC: 7 MMOL/L (ref 2–11)
APPEARANCE UR: CLEAR
AST SERPL-CCNC: 54 U/L (ref 15–37)
BACTERIA URNS QL MICRO: NEGATIVE /HPF
BASOPHILS # BLD: 0.1 K/UL (ref 0–0.2)
BASOPHILS NFR BLD: 1 % (ref 0–2)
BILIRUB SERPL-MCNC: 0.7 MG/DL (ref 0.2–1.1)
BILIRUB UR QL: NEGATIVE
BUN SERPL-MCNC: 15 MG/DL (ref 8–23)
CALCIUM SERPL-MCNC: 9.3 MG/DL (ref 8.3–10.4)
CASTS URNS QL MICRO: ABNORMAL /LPF
CHLORIDE SERPL-SCNC: 102 MMOL/L (ref 101–110)
CO2 SERPL-SCNC: 29 MMOL/L (ref 21–32)
COLOR UR: ABNORMAL
CREAT SERPL-MCNC: 0.91 MG/DL (ref 0.6–1)
DIFFERENTIAL METHOD BLD: ABNORMAL
EOSINOPHIL # BLD: 0 K/UL (ref 0–0.8)
EOSINOPHIL NFR BLD: 0 % (ref 0.5–7.8)
EPI CELLS #/AREA URNS HPF: ABNORMAL /HPF
ERYTHROCYTE [DISTWIDTH] IN BLOOD BY AUTOMATED COUNT: 14.3 % (ref 11.9–14.6)
FLUAV AG NPH QL IA: NEGATIVE
FLUBV AG NPH QL IA: NEGATIVE
GLOBULIN SER CALC-MCNC: 2.8 G/DL (ref 2.8–4.5)
GLUCOSE SERPL-MCNC: 104 MG/DL (ref 65–100)
GLUCOSE UR STRIP.AUTO-MCNC: NEGATIVE MG/DL
HCT VFR BLD AUTO: 36.4 % (ref 35.8–46.3)
HGB BLD-MCNC: 12 G/DL (ref 11.7–15.4)
HGB UR QL STRIP: NEGATIVE
IMM GRANULOCYTES # BLD AUTO: 0 K/UL (ref 0–0.5)
IMM GRANULOCYTES NFR BLD AUTO: 0 % (ref 0–5)
KETONES UR QL STRIP.AUTO: 15 MG/DL
LACTATE SERPL-SCNC: 1.4 MMOL/L (ref 0.4–2)
LEUKOCYTE ESTERASE UR QL STRIP.AUTO: ABNORMAL
LIPASE SERPL-CCNC: 112 U/L (ref 73–393)
LYMPHOCYTES # BLD: 1.5 K/UL (ref 0.5–4.6)
LYMPHOCYTES NFR BLD: 20 % (ref 13–44)
MAGNESIUM SERPL-MCNC: 2.3 MG/DL (ref 1.8–2.4)
MCH RBC QN AUTO: 31.3 PG (ref 26.1–32.9)
MCHC RBC AUTO-ENTMCNC: 33 G/DL (ref 31.4–35)
MCV RBC AUTO: 94.8 FL (ref 82–102)
MONOCYTES # BLD: 0.7 K/UL (ref 0.1–1.3)
MONOCYTES NFR BLD: 9 % (ref 4–12)
NEUTS SEG # BLD: 5.5 K/UL (ref 1.7–8.2)
NEUTS SEG NFR BLD: 71 % (ref 43–78)
NITRITE UR QL STRIP.AUTO: NEGATIVE
NRBC # BLD: 0 K/UL (ref 0–0.2)
PH UR STRIP: 5.5 [PH] (ref 5–9)
PLATELET # BLD AUTO: 264 K/UL (ref 150–450)
PMV BLD AUTO: 10.1 FL (ref 9.4–12.3)
POTASSIUM SERPL-SCNC: 4.4 MMOL/L (ref 3.5–5.1)
PROT SERPL-MCNC: 6.5 G/DL (ref 6.3–8.2)
PROT UR STRIP-MCNC: 30 MG/DL
RBC # BLD AUTO: 3.84 M/UL (ref 4.05–5.2)
RBC #/AREA URNS HPF: ABNORMAL /HPF
SARS-COV-2 RDRP RESP QL NAA+PROBE: NOT DETECTED
SODIUM SERPL-SCNC: 138 MMOL/L (ref 133–143)
SOURCE: NORMAL
SP GR UR REFRACTOMETRY: 1.02 (ref 1–1.02)
SPECIMEN SOURCE: NORMAL
UROBILINOGEN UR QL STRIP.AUTO: 0.2 EU/DL (ref 0.2–1)
WBC # BLD AUTO: 7.8 K/UL (ref 4.3–11.1)
WBC URNS QL MICRO: ABNORMAL /HPF

## 2022-12-08 PROCEDURE — 87804 INFLUENZA ASSAY W/OPTIC: CPT

## 2022-12-08 PROCEDURE — 80053 COMPREHEN METABOLIC PANEL: CPT

## 2022-12-08 PROCEDURE — 83605 ASSAY OF LACTIC ACID: CPT

## 2022-12-08 PROCEDURE — 6360000002 HC RX W HCPCS: Performed by: NURSE PRACTITIONER

## 2022-12-08 PROCEDURE — 87635 SARS-COV-2 COVID-19 AMP PRB: CPT

## 2022-12-08 PROCEDURE — 96374 THER/PROPH/DIAG INJ IV PUSH: CPT

## 2022-12-08 PROCEDURE — 96361 HYDRATE IV INFUSION ADD-ON: CPT

## 2022-12-08 PROCEDURE — 85025 COMPLETE CBC W/AUTO DIFF WBC: CPT

## 2022-12-08 PROCEDURE — 70450 CT HEAD/BRAIN W/O DYE: CPT

## 2022-12-08 PROCEDURE — 99284 EMERGENCY DEPT VISIT MOD MDM: CPT

## 2022-12-08 PROCEDURE — 83690 ASSAY OF LIPASE: CPT

## 2022-12-08 PROCEDURE — 83735 ASSAY OF MAGNESIUM: CPT

## 2022-12-08 PROCEDURE — 81001 URINALYSIS AUTO W/SCOPE: CPT

## 2022-12-08 PROCEDURE — 2580000003 HC RX 258: Performed by: NURSE PRACTITIONER

## 2022-12-08 PROCEDURE — 87086 URINE CULTURE/COLONY COUNT: CPT

## 2022-12-08 RX ORDER — 0.9 % SODIUM CHLORIDE 0.9 %
1000 INTRAVENOUS SOLUTION INTRAVENOUS ONCE
Status: COMPLETED | OUTPATIENT
Start: 2022-12-08 | End: 2022-12-08

## 2022-12-08 RX ORDER — ONDANSETRON 2 MG/ML
4 INJECTION INTRAMUSCULAR; INTRAVENOUS
Status: COMPLETED | OUTPATIENT
Start: 2022-12-08 | End: 2022-12-08

## 2022-12-08 RX ADMIN — SODIUM CHLORIDE 1000 ML: 9 INJECTION, SOLUTION INTRAVENOUS at 13:55

## 2022-12-08 RX ADMIN — ONDANSETRON 4 MG: 2 INJECTION INTRAMUSCULAR; INTRAVENOUS at 13:55

## 2022-12-08 ASSESSMENT — PAIN - FUNCTIONAL ASSESSMENT: PAIN_FUNCTIONAL_ASSESSMENT: NONE - DENIES PAIN

## 2022-12-08 NOTE — DISCHARGE INSTRUCTIONS
Please resume compliance with prescribed antibiotics for UTI. Return to the ED if there is any change in mental status, return of vomiting, any worsening condition. Follow-up with your PCP in 1 day. Take medications as prescribed. Follow-up with recommended provider in the next 1-2 days. Return to the ED immediately for any new, worsening, concerning symptoms; or for danger signs as discussed.

## 2022-12-08 NOTE — PROGRESS NOTES
Spoke with pt's daughter and she agrees to take her mother back to ER for further evaluation and care at this time. Pt's daughter agrees to keep me posted with any concerns. Ginger Linder MS, APRN, FNP-C

## 2022-12-08 NOTE — ED NOTES
Patient was here on 12/3 following a fall on night of 12/2. Since that time patient has been having vomiting with decreased oral intake. Patient with history of some confusion however worse than normal. Patient had family over last night telling them she was dying and crying. Patient denies any pain at this time. Answering our questions correctly in relation to self, place time and event however still having periods of confusion.       Renee Palomo RN  12/08/22 0773

## 2022-12-08 NOTE — ED PROVIDER NOTES
[facility] Emergency Department Provider Note                   PCP:                KELBY Corado NP               Age: 80 y.o. Sex: female       ICD-10-CM    1. Dehydration  E86.0       2. Urinary tract infection in female  N39.0       3. Nausea and vomiting, unspecified vomiting type  R11.2           DISPOSITION    Discharged    MDM     [facility] as charted. Pt neck is supple, no meningeal signs. Lungs are clear to auscultation, no diminished sounds. Abdomen is soft and not tender. She denies pain, urinary complaint, denies nausea and vomiting to me in the ED. They are concerned she may be a bit dehydrated. She has no headache, no numbness/ting's weakness  Alert and appropriately oriented. She has no neurofocal deficits. Is a very reassuring exam.  Patient shows sound decision-making ability. States she refuses to be admitted to the hospital, that she does not feel sick. Labs are quite reassuring, repeated CT scan of the head and appears stable with no acute process noted. Normal lactic acid. Negative flu and COVID testing, UA consistent with UTI. She is currently taking antibiotics, there is no culture available. We have obtained urine culture. Patient is weightbearing and ambulatory throughout the department of her own volition using her rolling walker, which is how she ambulates at baseline. Daughter states that she is concerned that she and her father cannot care for the patient at the independent setting in which they live. States that the primary care provider stated that if he came to the emergency department today, we would be able to have the patient placed in an assisted living facility.   Slight, not able to have patient placed in assisted living facility from the ED, spoke with ED  who has contacted the [facility] ([facility]) where the patient currently lives, states that they are able to begin the process to have patient and her  moved to assisted living at their facility. Patient and daughter are quite satisfied with this outcome, advised to follow-up with administrators at the site as well as the primary care to facilitate completion of this. Given strict return precautions. Strict return to ED for care instruction provided. Advised to follow-up with PCP in 1-2 days. Return to ED immediately for any new, worsening, concerning symptoms. Patient is very well-hydrated appearing, no distress, pleasant and conversational.  Nontoxic-appearing, tolerating oral intake. Patient is hemodynamically stable and in no acute distress. Patient is not ill-appearing. Discussed patient with attending who reviewed the patient's results and collaborated on care planning. All findings and plans were discussed with the patient. Patient verbalizes desire to be discharged home at this time. All questions answered. Discussed with the patient that an unremarkable evaluation in the ED does not preclude the development or presence of a serious or life threatening condition. Patient was instructed to return immediately for any worsening or change in current symptoms, or if symptoms do not continue to improve. I instructed them to follow up with their primary care provider, own specialist, or medical provider that I am recommending for him within the next 2-3 days     the patient acknowledged understanding plan of care and affirmed approval. Patient is discharged home, with no further complaint.      Orders Placed This Encounter   Procedures    Rapid influenza A/B antigens    COVID-19, Rapid    CT Head W/O Contrast    Lipase    Magnesium    CBC with Auto Differential    CMP    Lactic Acid    Urinalysis        Shikha Fleming is a 80 y.o. female who presents to the Emergency Department with chief complaint of    Chief Complaint   Patient presents with    Emesis      HPI  Pleasant 75-year-old female presents to the ED with her daughter with complaint of worsening confusion over the last few days, decreased oral intake, nausea and vomiting. States that patient had a fall last weekend, was evaluated in the ED and after reassuring work-up was discharged home. They state that since that time the patient has had nausea with multiple episodes of vomiting, has had a decreased appetite due to nausea has not been eating or drinking very much, nor taking her medications as prescribed. Followed up with her primary care who noted white blood cells in UA from ED and prescribe Cipro, though they state that they have not been compliant with this medication. They took 1 dose yesterday morning and 1 dose this morning. States that the patient was crying and tearful last night stating that she was afraid she was \"going to die. \"  Daughter states they prayed after that, and patient suddenly stood up, states she felt well and went to watch television, has been largely improved since that time. They deny any specific urinary complaint. Denies black, bloody or bilious emesis. No diarrhea, constipation, abdominal pain, chest pain or back pain. Denies fever/chills, change in appetite, weight loss, decreased oral intake, blurred vision, headaches, neck pain/stiffness. That they called the patient's PCP today and they advised him to come to the ED for evaluation. Alert & oriented x 3, afebrile, hemodynamically stable, non-toxic appearing, appears in no distress. Medical/surgical/social history reviewed with the patient. Review of Systems  Constitutional: Negative for fever. Negative for appetite change, chills, diaphoresis and unexpected weight change. HENT: As in HPI     Eyes: Negative. Respiratory: As in HPI   Cardiovascular: Negative. GI/: As in HPI      Musculoskeletal: As in HPI   Skin: Negative. Allergic/Immunologic: Negative.      Neurological: As in HPI      Past Medical History:   Diagnosis Date    CAD (coronary artery disease) 2008    Had 4 stents    GERD (gastroesophageal reflux disease)     Has been in ER about 2 mo ago for issues Has issues with dry coughing, vomiting,and bad bowels issues    Hearing loss     Been wearing aids for a long time    Hyperlipidemia     Irritable bowel syndrome     Never professionally diagnosed but thinks she has it    Osteoarthritis Many years    Has it in neck, back and hands    Primary hypertension         Past Surgical History:   Procedure Laterality Date    CARDIAC SURGERY  2008    4 stents    COSMETIC SURGERY  Many    Eyes, and 3 face lifts    HYSTERECTOMY, TOTAL ABDOMINAL (CERVIX REMOVED)  1972    JOINT REPLACEMENT Bilateral 20-30 yrs ago    Knees        Family History   Problem Relation Age of Onset    Alcohol Abuse Father     Alcohol Abuse Brother     Alcohol Abuse Sister     Alcohol Abuse Brother         Social History     Socioeconomic History    Marital status:    Tobacco Use    Smoking status: Never    Smokeless tobacco: Never   Substance and Sexual Activity    Alcohol use:  Yes     Alcohol/week: 14.0 standard drinks     Types: 14 Glasses of wine per week     Comment: Has had a history of bad drinking    Drug use: Never    Sexual activity: Not Currently     Social Determinants of Health     Physical Activity: Inactive    Days of Exercise per Week: 0 days    Minutes of Exercise per Session: 0 min   Intimate Partner Violence: Not At Risk    Fear of Current or Ex-Partner: No    Emotionally Abused: No    Physically Abused: No    Sexually Abused: No         Doxycycline and Seasonal     Previous Medications    AMLODIPINE (NORVASC) 5 MG TABLET    Take 1 tablet by mouth daily    ASPIRIN EC 81 MG EC TABLET    Take 1 tablet by mouth daily    BENZONATATE (TESSALON) 100 MG CAPSULE    Take 1 capsule by mouth 3 times daily as needed for Cough    CETIRIZINE (ZYRTEC) 10 MG TABLET    Take 10 mg by mouth as needed for Allergies    CHOLECALCIFEROL (VITAMIN D3) 125 MCG (5000 UT) CAPS    Take 1 capsule by mouth daily    CHOLESTYRAMINE (QUESTRAN) 4 G PACKET Take 1 packet by mouth daily as needed (IBS)    CIPROFLOXACIN (CIPRO) 250 MG TABLET    Take 1 tablet by mouth 2 times daily for 5 days (Antibiotic)    EZETIMIBE (ZETIA) 10 MG TABLET    Take 1 tablet by mouth daily    FAMOTIDINE (PEPCID) 20 MG TABLET    Take 1 tablet by mouth 2 times daily    LISINOPRIL (PRINIVIL;ZESTRIL) 10 MG TABLET    TAKE 1 TABLET BY MOUTH DAILY FOR 90 DAYS    MULTIPLE VITAMINS-MINERALS (THERAPEUTIC MULTIVITAMIN-MINERALS) TABLET    Take 1 tablet by mouth daily    NAPROXEN (NAPROSYN) 500 MG TABLET    Take 500 mg by mouth as needed for Pain    NYSTATIN-TRIAMCINOLONE (MYCOLOG II) 371114-0.1 UNIT/GM-% CREAM    Apply to affected area twice a day for 7 days    ONDANSETRON (ZOFRAN-ODT) 4 MG DISINTEGRATING TABLET    Take 1 tablet by mouth 4 times daily as needed for Nausea or Vomiting    PROBIOTIC ACIDOPHILUS (FLORANEX) TABS    Take 1 tablet by mouth daily With food (good bacteria supplementation)    VALACYCLOVIR (VALTREX) 1 G TABLET    TAKE 2 TABLETS AT THE ONSET OF THE COLD SORE, AND 2 TABLETS 12 HOURS LATER        Vitals signs and nursing note reviewed. Patient Vitals for the past 4 hrs:   Temp Pulse Resp BP SpO2   12/08/22 1148 98.4 °F (36.9 °C) 80 16 139/76 98 %          Physical Exam   Constitutional: Oriented to person, place, and time. Appears well-developed and well-nourished. No distress. HENT:    Head: Normocephalic and atraumatic. Right Ear: External ear normal.    Left Ear: External ear normal.    Nose: Nose normal.    Mouth/Throat: Mouth normal. Uvula midline, no erythema/exudates/edema. No drooling, no voice change. No pain with ROM of neck. Eyes: Conjunctivae are normal.   Neck: Supple. No tracheal deviation. Not tender, not rigid, no menningeal signs. Cardiovascular: Normal rate, intact distal pulses. Pulmonary/Chest: No respiratory distress. Abdominal: Soft. No tenderness, guarding, rebound, rigidity. Musculoskeletal: No obvious deformity, erythema, edema. Neurological: Alert and oriented to person, place, and time. Skin:  No abrasion, no lesion, no petechiae and no rash noted. Not diaphoretic. No cyanosis, erythema, or pallor. Psychiatric: Normal mood and affect. Behavior is normal.    Nursing note and vitals reviewed. Procedures      Labs Reviewed   CBC WITH AUTO DIFFERENTIAL - Abnormal; Notable for the following components:       Result Value    RBC 3.84 (*)     Eosinophils % 0 (*)     All other components within normal limits   COMPREHENSIVE METABOLIC PANEL - Abnormal; Notable for the following components:    Glucose 104 (*)     Est, Glom Filt Rate 60 (*)     AST 54 (*)     All other components within normal limits   URINALYSIS - Abnormal; Notable for the following components:    Protein, UA 30 (*)     Ketones, Urine 15 (*)     Leukocyte Esterase, Urine TRACE (*)     WBC, UA 5-10 (*)     Casts 2-5 (*)     All other components within normal limits   RAPID INFLUENZA A/B ANTIGENS   COVID-19, RAPID   CULTURE, URINE   LIPASE   MAGNESIUM   LACTIC ACID        CT Head W/O Contrast   Final Result   1. No acute intracranial abnormality. 2. Stable ventriculomegaly with possible hydrocephalus. 3. Stable periventricular white matter hypoattenuation most in keeping with   chronic microangiopathic disease. Voice dictation software was used during the making of this note. This software is not perfect and grammatical and other typographical errors may be present. This note has not been completely proofread for errors.          KELBY Moffett - CNP  12/08/22 [id number]

## 2022-12-08 NOTE — ED NOTES
Pt will notify staff when she is able to provide a urine specimen.      Brian Daly RN  12/08/22 [id number]

## 2022-12-11 LAB
BACTERIA SPEC CULT: NORMAL
SERVICE CMNT-IMP: NORMAL

## 2022-12-14 DIAGNOSIS — B00.1 COLD SORE: Primary | ICD-10-CM

## 2022-12-14 RX ORDER — VALACYCLOVIR HYDROCHLORIDE 1 G/1
TABLET, FILM COATED ORAL
Qty: 4 TABLET | Refills: 5 | Status: SHIPPED | OUTPATIENT
Start: 2022-12-14

## 2023-01-05 ENCOUNTER — NURSE ONLY (OUTPATIENT)
Dept: FAMILY MEDICINE CLINIC | Facility: CLINIC | Age: 88
End: 2023-01-05

## 2023-01-05 DIAGNOSIS — K58.0 IRRITABLE BOWEL SYNDROME WITH DIARRHEA: ICD-10-CM

## 2023-01-05 DIAGNOSIS — E55.9 VITAMIN D DEFICIENCY: ICD-10-CM

## 2023-01-05 DIAGNOSIS — N28.9 RENAL INSUFFICIENCY: ICD-10-CM

## 2023-01-05 DIAGNOSIS — I10 PRIMARY HYPERTENSION: ICD-10-CM

## 2023-01-05 DIAGNOSIS — I25.10 CORONARY ARTERY DISEASE INVOLVING NATIVE CORONARY ARTERY OF NATIVE HEART WITHOUT ANGINA PECTORIS: ICD-10-CM

## 2023-01-05 DIAGNOSIS — K21.9 GASTROESOPHAGEAL REFLUX DISEASE WITHOUT ESOPHAGITIS: ICD-10-CM

## 2023-01-06 LAB
25(OH)D3 SERPL-MCNC: 23.8 NG/ML (ref 30–100)
ALBUMIN SERPL-MCNC: 3.9 G/DL (ref 3.2–4.6)
ALBUMIN/GLOB SERPL: 1.6 {RATIO} (ref 0.4–1.6)
ALP SERPL-CCNC: 111 U/L (ref 50–136)
ALT SERPL-CCNC: 23 U/L (ref 12–65)
ANION GAP SERPL CALC-SCNC: 4 MMOL/L (ref 2–11)
AST SERPL-CCNC: 18 U/L (ref 15–37)
BILIRUB SERPL-MCNC: 0.5 MG/DL (ref 0.2–1.1)
BUN SERPL-MCNC: 19 MG/DL (ref 8–23)
CALCIUM SERPL-MCNC: 9 MG/DL (ref 8.3–10.4)
CHLORIDE SERPL-SCNC: 105 MMOL/L (ref 101–110)
CO2 SERPL-SCNC: 29 MMOL/L (ref 21–32)
CREAT SERPL-MCNC: 0.9 MG/DL (ref 0.6–1)
GLOBULIN SER CALC-MCNC: 2.5 G/DL (ref 2.8–4.5)
GLUCOSE SERPL-MCNC: 97 MG/DL (ref 65–100)
POTASSIUM SERPL-SCNC: 4.4 MMOL/L (ref 3.5–5.1)
PROT SERPL-MCNC: 6.4 G/DL (ref 6.3–8.2)
SODIUM SERPL-SCNC: 138 MMOL/L (ref 133–143)

## 2023-01-12 ENCOUNTER — OFFICE VISIT (OUTPATIENT)
Dept: FAMILY MEDICINE CLINIC | Facility: CLINIC | Age: 88
End: 2023-01-12
Payer: MEDICARE

## 2023-01-12 VITALS
OXYGEN SATURATION: 95 % | HEIGHT: 62 IN | HEART RATE: 74 BPM | DIASTOLIC BLOOD PRESSURE: 86 MMHG | SYSTOLIC BLOOD PRESSURE: 132 MMHG | WEIGHT: 128.6 LBS | BODY MASS INDEX: 23.66 KG/M2

## 2023-01-12 DIAGNOSIS — N39.0 FREQUENT UTI: ICD-10-CM

## 2023-01-12 DIAGNOSIS — N28.9 RENAL INSUFFICIENCY: ICD-10-CM

## 2023-01-12 DIAGNOSIS — R05.3 CHRONIC COUGH: ICD-10-CM

## 2023-01-12 DIAGNOSIS — K21.9 GASTROESOPHAGEAL REFLUX DISEASE WITHOUT ESOPHAGITIS: ICD-10-CM

## 2023-01-12 DIAGNOSIS — E55.9 VITAMIN D DEFICIENCY: ICD-10-CM

## 2023-01-12 DIAGNOSIS — I25.10 CORONARY ARTERY DISEASE INVOLVING NATIVE CORONARY ARTERY OF NATIVE HEART WITHOUT ANGINA PECTORIS: ICD-10-CM

## 2023-01-12 DIAGNOSIS — J30.9 ALLERGIC RHINITIS, UNSPECIFIED SEASONALITY, UNSPECIFIED TRIGGER: ICD-10-CM

## 2023-01-12 DIAGNOSIS — H91.93 BILATERAL HEARING LOSS, UNSPECIFIED HEARING LOSS TYPE: ICD-10-CM

## 2023-01-12 DIAGNOSIS — Z91.81 AT HIGH RISK FOR FALLS: ICD-10-CM

## 2023-01-12 DIAGNOSIS — R41.3 MEMORY LOSS: Primary | ICD-10-CM

## 2023-01-12 DIAGNOSIS — G91.9 HYDROCEPHALUS, UNSPECIFIED TYPE (HCC): ICD-10-CM

## 2023-01-12 DIAGNOSIS — I10 PRIMARY HYPERTENSION: ICD-10-CM

## 2023-01-12 DIAGNOSIS — Z23 ENCOUNTER FOR IMMUNIZATION: ICD-10-CM

## 2023-01-12 DIAGNOSIS — M19.90 OSTEOARTHRITIS, UNSPECIFIED OSTEOARTHRITIS TYPE, UNSPECIFIED SITE: ICD-10-CM

## 2023-01-12 DIAGNOSIS — E78.5 HYPERLIPIDEMIA, UNSPECIFIED HYPERLIPIDEMIA TYPE: ICD-10-CM

## 2023-01-12 DIAGNOSIS — K58.0 IRRITABLE BOWEL SYNDROME WITH DIARRHEA: ICD-10-CM

## 2023-01-12 DIAGNOSIS — J18.9 COMMUNITY ACQUIRED PNEUMONIA OF LEFT LOWER LOBE OF LUNG: ICD-10-CM

## 2023-01-12 DIAGNOSIS — B00.1 COLD SORE: ICD-10-CM

## 2023-01-12 PROCEDURE — 99214 OFFICE O/P EST MOD 30 MIN: CPT | Performed by: NURSE PRACTITIONER

## 2023-01-12 PROCEDURE — 1123F ACP DISCUSS/DSCN MKR DOCD: CPT | Performed by: NURSE PRACTITIONER

## 2023-01-12 RX ORDER — CHOLESTYRAMINE 4 G/9G
1 POWDER, FOR SUSPENSION ORAL DAILY PRN
Qty: 90 PACKET | Refills: 1 | Status: SHIPPED | OUTPATIENT
Start: 2023-01-12

## 2023-01-12 RX ORDER — CETIRIZINE HYDROCHLORIDE 5 MG/1
5 TABLET ORAL DAILY
Qty: 90 TABLET | Refills: 1 | Status: SHIPPED | OUTPATIENT
Start: 2023-01-12

## 2023-01-12 RX ORDER — LISINOPRIL 10 MG/1
10 TABLET ORAL DAILY
Qty: 90 TABLET | Refills: 1 | Status: SHIPPED | OUTPATIENT
Start: 2023-01-12

## 2023-01-12 RX ORDER — EZETIMIBE 10 MG/1
10 TABLET ORAL DAILY
Qty: 90 TABLET | Refills: 1 | Status: SHIPPED | OUTPATIENT
Start: 2023-01-12

## 2023-01-12 RX ORDER — VALACYCLOVIR HYDROCHLORIDE 1 G/1
TABLET, FILM COATED ORAL
Qty: 4 TABLET | Refills: 5 | Status: SHIPPED | OUTPATIENT
Start: 2023-01-12

## 2023-01-12 RX ORDER — CETIRIZINE HYDROCHLORIDE 10 MG/1
10 TABLET ORAL DAILY
Qty: 90 TABLET | Refills: 1 | Status: SHIPPED | OUTPATIENT
Start: 2023-01-12 | End: 2023-01-12 | Stop reason: DRUGHIGH

## 2023-01-12 RX ORDER — FLUTICASONE PROPIONATE 50 MCG
2 SPRAY, SUSPENSION (ML) NASAL DAILY
Qty: 16 G | Refills: 5 | Status: SHIPPED | OUTPATIENT
Start: 2023-01-12

## 2023-01-12 RX ORDER — AMLODIPINE BESYLATE 5 MG/1
5 TABLET ORAL DAILY
Qty: 90 TABLET | Refills: 1 | Status: SHIPPED | OUTPATIENT
Start: 2023-01-12

## 2023-01-12 ASSESSMENT — PATIENT HEALTH QUESTIONNAIRE - PHQ9
SUM OF ALL RESPONSES TO PHQ QUESTIONS 1-9: 0
SUM OF ALL RESPONSES TO PHQ9 QUESTIONS 1 & 2: 0
SUM OF ALL RESPONSES TO PHQ QUESTIONS 1-9: 0
1. LITTLE INTEREST OR PLEASURE IN DOING THINGS: 0
SUM OF ALL RESPONSES TO PHQ QUESTIONS 1-9: 0
2. FEELING DOWN, DEPRESSED OR HOPELESS: 0
SUM OF ALL RESPONSES TO PHQ QUESTIONS 1-9: 0

## 2023-01-12 NOTE — PROGRESS NOTES
[address] [facility] [address], [address] [facility]  Phone: [phone] Fax [phone]  Antonella FullerAleta Linder MS, APRN, FNP-C  1/12/2023  Chief Complaint   Patient presents with    Follow-up     Pt here today (accompanied by her daughter who helped pt answer some questions today) for a f/u to discuss ER visits 12/3/22 (for a fall) and 12/8/22 (for AMS/UTI), to recheck following CAP LLL October-November 2022, and to recheck numerous chronic conditions. Please see ROS/Assessment and Plan section for full details of all items addressed during today's appointment. ASSESSMENT/PLAN:  Below is the assessment and plan developed based on review of pertinent history, physical exam, labs, studies, and medications. 1. Memory loss  Pt was seen in ER for fall 12/3/22 after rollator walker breaks were not set and pt slipped and fell. Workup revealed no acute injuries except for a skin tear to left lower leg which has completely healed per pt and daughter. Denies any falls since. Pt was then seen in ER 12/8/22 and found to have AMS caused by UTI. Treated with Cipro and pt and daughter report that all symptoms of UTI have fully resolved. Head CT w/o contrast revealed nothing acute. Did show some chronic/stable white matter changes. ER physical per pt's daughter disagreed with the reading of possible hydrocephalus. Per pt's daughter, pt has had some increased short term memory loss over past year. Daughter concerned about pt having dementia. Discussed with pt/daughter. Pt's daughter tried to arrange for pt to enter assisted living, but pt and pt's  declined. Pt's daughter states that for now she is able to help care for pt/ and declined referral for [facility] at this time. I also offered a referral to Neurology and offered to order and MRI of the brain to further evaluate worsening short term memory loss, but pt/daughter declined at this time.  Pt's daughter does report that pt has had frequent UTI's over past year and would like pt to see Urology to discuss starting preventative abx. Referral given to [facility]. In mean time, will have pt start daily OTC cranberry supplement and stay well hydrated. Fall precautions discussed with pt. Pt to f/u with me in 3 months. Pt's daughter agrees to call sooner if pt has any new/worsening symptoms or concerns. Will monitor.   - TSH with Reflex; Future    2. Hydrocephalus, unspecified type (HCC)  See # 1 above.   - TSH with Reflex; Future    3. Frequent UTI  See # 1 above.   - [facility]    4. At high risk for falls  See # 1 above.     5. Community acquired pneumonia of left lower lobe of lung  6. Chronic cough  I treated pt for CAP to LLL seen on CXR 10/28/22 and resolving on CXR 11/10/22 after treatment with Doxy and Z-pack. Pt's chronic cough that she was previously having has completely resolved per pt/daughter. Denies any fevers, SOB, wheezing, CP. Pt/daughter declined final repeat CXR as her symptoms have completely resolved. Pt's lungs CTA on physical exam today. RA sat WNL 95%. Will resolve issues from problem list.     7. Allergic rhinitis, unspecified seasonality, unspecified trigger  Pt reports taking Zyrtec and Flonase for allergic rhinitis as directed-now well controlled. Denies any current symptoms. Discussed with pt/daughter. Will continue/refill Flonase and Zyrtec at same doses. Pt to f/u with me in 3 months. Will monitor.   - fluticasone (FLONASE) 50 MCG/ACT nasal spray; 2 sprays by Each Nostril route daily  Dispense: 16 g; Refill: 5  - cetirizine (ZYRTEC) 5 MG tablet; Take 1 tablet by mouth daily  Dispense: 90 tablet; Refill: 1    8. Gastroesophageal reflux disease without esophagitis  9. Irritable bowel syndrome with diarrhea  Pt now under care of GI-Dr. Du for GERD and IBS-D. Saw him 11/11/22. Pt reports taking PRN OTC Pepcid and Cholestyramine and is following GERD/IBS diet per instruction of GI-Dr. Du.  Pt reports both well controlled and denies any current symptoms. Discussed with pt/daughter. Will have pt continue POC/medications per GI-Dr. Francisca Wilder. Refill given on Cholestyramine at same dose. Pt to f/u with me in 3 months. Will monitor.   - CBC with Auto Differential; Future  - Comprehensive Metabolic Panel; Future  - cholestyramine (QUESTRAN) 4 g packet; Take 1 packet by mouth daily as needed (IBS)  Dispense: 90 packet; Refill: 1    10. Primary hypertension  Pt reports taking Norvasc 5 mg po daily and Lisinopril 10 mg po daily as directed. Pt following heart healthy/DASH diet. Pt's BP /86 today (goal <140/90). Discussed with pt/daughter. Will continue/refill Norvasc and Lisinopril at same doses and have pt continue to follow heart healthy/DASH diet. Pt to f/u with me in 3 months. Will monitor.  - Comprehensive Metabolic Panel; Future  - lisinopril (PRINIVIL;ZESTRIL) 10 MG tablet; Take 1 tablet by mouth daily TAKE 1 TABLET BY MOUTH DAILY FOR 90 DAYS  Dispense: 90 tablet; Refill: 1  - amLODIPine (NORVASC) 5 MG tablet; Take 1 tablet by mouth daily  Dispense: 90 tablet; Refill: 1    11. Hyperlipidemia, unspecified hyperlipidemia type  Pt was switched from Simvastatin to Zetia as pt's Simvastatin possibly interacted with her Norvasc and pt/daughter no longer wished for pt to be on statin. Pt reports taking Zetia 10 mg po daily and is following heart healthy/DASH diet. On 10/12/22, pt's fasting lipids were well controlled and at goal. Discussed with pt/daughter. Will continue/refill Zetia at same dose and have pt continue heart healthy/DASH diet. Will check fasting lipids prior to f/u with me in 3 months. Will monitor.   - Lipid Panel; Future  - ezetimibe (ZETIA) 10 MG tablet; Take 1 tablet by mouth daily  Dispense: 90 tablet; Refill: 1    12. Coronary artery disease involving native coronary artery of native heart without angina pectoris  Pt reports having hx of CAD-hx of cardiac stents in past per pt.  Pt under care of [address] who last saw pt 10/25/22 when pt established care for her CAD. Pt denies any recent or current chest pain or pressure or SOB. Pt has f/u with [facility] Cardiology-Dr. Charisma Hernandez 1/27/23. Discussed with pt/daughter. Pt encouraged to continue medications/POC per [facility] Cardiology-Dr. Charisma Hernandez for CAD and keep upcoming appointment as scheduled. Pt/daughter agree to go to ER or call 9-1-1 for any symptoms of MI as discussed. Pt to f/u with me in 3 months. Will monitor. 13. Osteoarthritis, unspecified osteoarthritis type, unspecified site  Pt reports having OA pain to neck, back, hands-relieved by occasional PRN OTC Aleve, but this was discouraged previously and pt recommended to take PRN OTC Tylenol Arthritis as directed due to age/recent mild renal insufficiency/hx of GERD. Discussed with pt/daughter. Pt again encouraged to avoid PRN OTC Aleve and other NSAIDs and take PRN OTC Tylenol Arthritis as directed for her OA pain. Pt to f/u with me in 3 months. Will monitor. 14. Cold sore  Pt reports having a hx of cold sores to her mouth. Takes PRN Valtrex with relief. Denies having any recent cold sores. Discussed with pt/daughter. Will continue/refill PRN Valtrex. Pt to f/u with me in 3 months. Will monitor  - valACYclovir (VALTREX) 1 g tablet; TAKE 2 TABLETS AT THE ONSET OF THE COLD SORE, AND 2 TABLETS 12 HOURS LATER  Dispense: 4 tablet; Refill: 5    15. Vitamin D deficiency   Pt reports taking Vitamin D 5000 units po daily as directed. On 1/5/23, pt's Vitamin D level was still low at 23.8 but improved over previous check of 19.1 Discussed with pt/daughter. Will continue/refill Vitamin D 5000 units po daily for now. Will recheck Vitamin D level prior to f/u with me in 3 months. May consider adjusting dose of Vitamin D at that time based on result if needed. Will monitor.   - Vitamin D 25 Hydroxy; Future  - Cholecalciferol (VITAMIN D3) 125 MCG (5000 UT) CAPS;  Take 1 capsule by mouth daily  Dispense: 90 capsule; Refill: 1    16. Renal insufficiency  On 23, pt's serum creatinine was WNL at 0.9 and GFR WNL >60. Previous renal insufficiency appears resolved at this time. Discussed with pt/daughter. Pt encouraged to stay well hydrated and to avoid PRN OTC Aleve/other OTC NSAIDs. Will recheck CMP prior to f/u with me in 3 months to ensure stability of renal function. Will monitor.   - Comprehensive Metabolic Panel; Future    17. Bilateral hearing loss, unspecified hearing loss type  Pt has bilat hearing loss-bilat hearing aids    18. Encounter for immunization  Pt declined Covid, Flu, Tdap, Shingrix, Pneumonia vaccines. Will continue to encourage pt to get UTD on her vaccines at future appointments. Return in about 3 months (around 2023) for To recheck chronic conditions/fasting labs prior. Call sooner for concerns. .        SUBJECTIVE/OBJECTIVE:    HPI 11/10/22-  Bard Velásquez (: 1931) is a 80 y.o. female, Established patient patient, here for evaluation of the following chief complaint(s):  Follow-up (Pt here today to recheck CAP to LLL and to recheck multiple chronic conditions. Pt's accompanied today by her daughter. /), Pneumonia (Pt had previously reported having a chronic cough for past month or so. Was dry at first but became productive with yellow sputum. Pt had a CXR 10/27/22 that revealed  possible patchy pneumonia to LLL. CBC was WNL. Pt was treated with Doxy 100 mg po bid for 7 days as well as PRN OTC Mucinex and PRN Tessalon. Pt reports taking as directed. ), Allergic Reaction (Pt reports several times that she felt that Doxy caused her to itch, but never developed a rash or any face, lip, tongue, or throat swelling. Pt was able to finish the Doxy-due to reaction will list Doxy as possible allergy however. Pt reports that her productive cough has completely resolved.  Pt denies having any fevers and denies any SOB, wheezing, or CP./), and Allergic Rhinitis  (Pt reports taking Zyrtec and Flonase for allergic rhinitis as directed-now well controlled. Denies any current symptoms. Please see ROS/Assessment and Plan sections for full details of all other items addressed during today's appointment. )       HPI today-  Patsy Lam (: 1931) is a 80 y.o. female, Established patient patient, here for evaluation of the following chief complaint(s):  Follow-up (Pt here today (accompanied by her daughter who helped pt answer some questions today) for a f/u to discuss ER visits 12/3/22 (for a fall) and 22 (for AMS/UTI), to recheck following CAP LLL October-2022, and to recheck numerous chronic conditions. Please see ROS/Assessment and Plan section for full details of all items addressed during today's appointment. )     Allergies   Allergen Reactions    Doxycycline Itching    Seasonal      [unfilled]  Past Medical History:   Diagnosis Date    CAD (coronary artery disease)     Had 4 stents    GERD (gastroesophageal reflux disease)     Has been in ER about 2 mo ago for issues Has issues with dry coughing, vomiting,and bad bowels issues    Hearing loss     Been wearing aids for a long time    Hyperlipidemia     Irritable bowel syndrome     Never professionally diagnosed but thinks she has it    Osteoarthritis Many years    Has it in neck, back and hands    Primary hypertension      Past Surgical History:   Procedure Laterality Date    CARDIAC SURGERY      4 stents    COSMETIC SURGERY  Many    Eyes, and 3 face lifts    HYSTERECTOMY, TOTAL ABDOMINAL (CERVIX REMOVED)  1972    JOINT REPLACEMENT Bilateral 20-30 yrs ago    Knees     Family History   Problem Relation Age of Onset    Alcohol Abuse Father     Alcohol Abuse Brother     Alcohol Abuse Sister     Alcohol Abuse Brother      Social History     Tobacco Use   Smoking Status Never   Smokeless Tobacco Never         Review of Systems   Constitutional: Negative.   Negative for appetite change, chills, diaphoresis, fatigue, fever and unexpected weight change. HENT:  Positive for hearing loss (has bilat hearing aids). Negative for congestion, dental problem, drooling, ear discharge, ear pain, facial swelling, mouth sores, nosebleeds, postnasal drip, rhinorrhea, sinus pressure, sinus pain, sneezing, sore throat, tinnitus, trouble swallowing and voice change. Pt reports taking Zyrtec and Flonase for allergic rhinitis as directed-now well controlled. Denies any current symptoms. Pt reports having a hx of cold sores to her mouth. Takes PRN Valtrex with relief. Denies having any recent cold sores   Eyes: Negative. Negative for pain, discharge and visual disturbance. Respiratory: Negative. Negative for apnea, cough (resolved), choking, chest tightness, shortness of breath, wheezing and stridor. I treated pt for CAP to LLL seen on CXR 10/28/22 and resolving on CXR 11/10/22 after treatment with Doxy and Z-pack. Pt's chronic cough that she was previously having has completely resolved per pt/daughter. Denies any fevers, SOB, wheezing, CP. Pt/daughter declined final repeat CXR as her symptoms have completely resolved. Cardiovascular: Negative. Negative for chest pain, palpitations and leg swelling. Gastrointestinal:  Positive for diarrhea (IBS-D). Negative for abdominal distention, abdominal pain, anal bleeding, blood in stool, constipation, nausea, rectal pain and vomiting. Pt now under care of GI-Dr. Jose Richey for GERD and IBS-D. Saw him 11/11/22. Pt reports taking PRN OTC Pepcid and Cholestyramine and is following GERD/IBS diet per instruction of GI-Dr. Jose Richey. Pt reports both well controlled and denies any current symptoms. Endocrine: Negative. Negative for cold intolerance, heat intolerance, polydipsia, polyphagia and polyuria.    Genitourinary:  Negative for decreased urine volume, difficulty urinating, dyspareunia, dysuria, enuresis, flank pain, frequency, genital sores, hematuria, menstrual problem, pelvic pain, urgency, vaginal bleeding, vaginal discharge and vaginal pain. Pt was then seen in ER 12/8/22 and found to have AMS caused by UTI. Treated with Cipro and pt and daughter report that all symptoms of UTI have fully resolved. Pt's daughter does report that pt has had frequent UTI's over past year and would like pt to see Urology to discuss starting preventative abx. Musculoskeletal:  Positive for arthralgias (OA pain to neck, back, hands-relieved by occasional PRN OTC Aleve, but this was discouraged previously and pt recommended to take PRN OTC Tylenol Arthritis as directed due to age/recent mild renal insufficiency/hx of GERD) and gait problem (uses rolling walker due to OA pain). Negative for back pain, joint swelling, myalgias, neck pain and neck stiffness. Skin: Negative. Negative for color change, pallor, rash and wound. Allergic/Immunologic: Positive for environmental allergies (Pt reports taking Zyrtec and Flonase for allergic rhinitis as directed-now well controlled. Denies any current symptoms. ). Neurological:  Negative for dizziness, tremors, seizures, syncope, facial asymmetry, speech difficulty, weakness, light-headedness, numbness and headaches. Pt was then seen in ER 12/8/22 and found to have AMS caused by UTI. Treated with Cipro and pt and daughter report that all symptoms of UTI have fully resolved. Head CT w/o contrast revealed nothing acute. Did show some chronic/stable white matter changes. ER physical per pt's daughter disagreed with the reading of possible hydrocephalus. Per pt's daughter, pt has had some increased short term memory loss over past year. Daughter concerned about pt having dementia. Discussed with pt/daughter. Pt's daughter tried to arrange for pt to enter assisted living, but pt and pt's  declined.  Pt's daughter states that for now she is able to help care for pt/ and declined referral for [facility] at this time. Hematological: Negative. Negative for adenopathy. Does not bruise/bleed easily. Psychiatric/Behavioral:  Positive for confusion (see Neuro above for short term memory loss). Negative for agitation, behavioral problems, decreased concentration, dysphoric mood, hallucinations, self-injury, sleep disturbance and suicidal ideas. The patient is not nervous/anxious and is not hyperactive. Vitals:    01/12/23 1407   BP: 132/86   Pulse: 74   SpO2: 95%       Physical Exam  Vitals reviewed. Constitutional:       General: She is not in acute distress. Appearance: Normal appearance. She is normal weight. She is not ill-appearing, toxic-appearing or diaphoretic. HENT:      Head: Normocephalic and atraumatic. Right Ear: Tympanic membrane, ear canal and external ear normal. There is no impacted cerumen. Left Ear: Tympanic membrane, ear canal and external ear normal. There is no impacted cerumen. Ears:      Comments: Bilat hearing aids removed from ear exam and then replaced     Nose: Nose normal. No congestion or rhinorrhea. Comments: No sinus ttp       Mouth/Throat:      Mouth: Mucous membranes are moist.      Pharynx: Oropharynx is clear. No oropharyngeal exudate or posterior oropharyngeal erythema. Comments: No cold sores noted to mouth at present  Eyes:      General: No scleral icterus. Right eye: No discharge. Left eye: No discharge. Extraocular Movements: Extraocular movements intact. Conjunctiva/sclera: Conjunctivae normal.      Pupils: Pupils are equal, round, and reactive to light. Cardiovascular:      Rate and Rhythm: Normal rate and regular rhythm. Pulses: Normal pulses. Heart sounds: Normal heart sounds. No murmur heard. No friction rub. No gallop. Pulmonary:      Effort: Pulmonary effort is normal. No respiratory distress. Breath sounds: Normal breath sounds. No stridor. No wheezing, rhonchi or rales.    Chest: Chest wall: No tenderness. Abdominal:      General: Abdomen is flat. Bowel sounds are normal. There is no distension. Palpations: Abdomen is soft. There is no mass. Tenderness: There is no abdominal tenderness. There is no right CVA tenderness, left CVA tenderness, guarding or rebound. Hernia: No hernia is present. Musculoskeletal:         General: No swelling, tenderness, deformity or signs of injury. Normal range of motion. Cervical back: Normal range of motion and neck supple. No rigidity or tenderness. Right lower leg: No edema. Left lower leg: No edema. Comments: gait slow but steady and assisted by rolling walker   Lymphadenopathy:      Cervical: No cervical adenopathy. Skin:     General: Skin is warm. Capillary Refill: Capillary refill takes less than 2 seconds. Coloration: Skin is not jaundiced or pale. Findings: No bruising, erythema, lesion or rash. Neurological:      General: No focal deficit present. Mental Status: She is alert. Cranial Nerves: No cranial nerve deficit. Sensory: No sensory deficit. Motor: No weakness. Coordination: Coordination normal.      Gait: Gait (gait slow but steady and assisted by rolling walker) normal.      Comments: Pt alert and oriented to person and place but mildly confused to time, but easily redirected. Psychiatric:         Mood and Affect: Mood normal.         Behavior: Behavior normal.         Thought Content:  Thought content normal.         Judgment: Judgment normal.       CT Head W/O Contrast [[id number]]  Status: Final result     Order Providers    Authorizing Billing   KELBY Mayberry - YOUSIF Odom MD            Signed by    Signed Date/Time Phone Pager   Ken Bahena 12/08/2022  1:27 [phone]      Reading Providers    Read Date Phone Pager   Jennyfer Junior Dec 8, 2022  1:27 [phone]                CT Head W/O Contrast: Patient Communication     Add Comments   Add Notifications          Routing History    Priority Sent On From To Message Type    12/11/2022  7:30 AM Doroteo, Bs Incoming Lab For Copath Pdfs P Sfe Ed Physician/Midlevel Result Pool Results               Radiation Dose Estimates    No radiation information found for this patient            Narrative   CT of the head without contrast.       CLINICAL INDICATION: Altered mental status, vomiting, confusion       PROCEDURE: Serial thin section axial images are obtained from the cranial vertex   through the skull base without the administration of intravenous contrast.    Radiation dose reduction techniques were used for this study. Our CT scanners   use one or all of the following: Automated exposure control, adjusted of the mA   and/or kV according to patient size, iterative reconstruction       COMPARISON: Head CT dated 12/3/2022. FINDINGS: There is no acute intracranial hemorrhage, mass, or mass effect. No   abnormal extra-axial fluid collections identified. Stable ventriculomegaly with   possible hydrocephalus. This is unchanged from the prior exam. The basilar   cisterns are widely patent. The gray-white matter brain parenchymal interface is   well-maintained. No skull fracture or aggressive osseous lesion noted. The   mastoid air cells and included paranasal sinuses are clear. Impression   1. No acute intracranial abnormality. 2. Stable ventriculomegaly with possible hydrocephalus. 3. Stable periventricular white matter hypoattenuation most in keeping with   chronic microangiopathic disease.    CT HEAD WO CONTRAST [[id number]]  Status: Final result     Order Providers    Authorizing Billing   MD Ciro Winslow MD            Signed by    Signed Date/Time Phone Pager   Eyad Sheikh 12/03/2022  3:04 [phone]      Reading Providers    Read Date Phone Pager   Eyad Sheikh Sat Dec 3, 2022  3:04 [phone]                CT HEAD WO CONTRAST: Patient Communication     Add Comments   Add Notifications                    Radiation Dose Estimates    No radiation information found for this patient            Narrative   Head CT       INDICATION: Fall with blunt head injury. Multiple axial images obtained through the brain without intravenous contrast.    Radiation dose reduction techniques were used for this study: All CT scans   performed at this facility use one or all of the following: Automated exposure   control, adjustment of the mA and/or kVp according to patient's size, iterative   reconstruction. FINDINGS: Periventricular white matter hypodensities are present likely   reflecting chronic microvascular ischemic change. There is no CT evidence of   acute hemorrhage or infarction. The ventricles appear prominent in size of   proportion to the amount of cerebral parenchymal volume loss possibly indicating   mild hydrocephalus. As the lateral and third ventricles. There are no   extra-axial fluid collections. No masses are seen. The sinuses are clear. There   are no bony lesions. Impression   1. No acute intracranial abnormality. Probable chronic microvascular ischemic   change. 2. Prominence of the lateral ventricles and third ventricle out of proportion to   the generalized cerebral parenchymal volume loss. Hydrocephalus is possible.            XR CHEST (2 VW) [[id number]]  Status: Final result     Order Providers    Authorizing Encounter Billing   KELBY Genao NP [facility] Jeronimo Cornelius MD            Signed by    Signed Date/Time Phone Pager   Scot Kelly 11/10/2022  4:44 [phone]      Reading Providers    Read Date Phone Pager   Carmen Varner Nov 10, 2022  4:44 [phone]        All Reviewers List    KELBY Genao NP on 11/10/2022 16:50         XR CHEST (2 VW): Result Notes     KELBY Genao NP   11/10/2022  4:50 PM EST         See My Chart note below XR CHEST (2 VW): Patient Communication     Append Comments   Seen      CXR revealed that left lower lobe pneumonia is resolving. I will recheck one more CXR at next appointment in 8 weeks. Please let me know if cough returns or if new symptoms develop   Written by KELBY Hernandes NP on 11/10/2022  4:50 PM EST  Seen by patient Re Mcnamara on 11/10/2022  6:09 PM                Radiation Dose Estimates    No radiation information found for this patient            Narrative   PA AND LATERAL CHEST X-RAY. Clinical Indication: Pneumonia, follow-up exam       Comparison: Chest x-ray dated 10/27/2022       Findings: 2 views of the chest submitted demonstrate decrease in the density of   the opacity in the left lower lung. This is most in keeping with a resolving   pneumonia. The right lung is clear. There is no pleural effusion. Impression   Resolving left lower lobe pneumonia. XR CHEST (2 VW) [[id number]]  Status: Edited Result - FINAL     Order Providers    Authorizing Encounter Billing   KELBY Miranda NP [facility] Lucita Bartlett MD            Signed by    Signed Date/Time Phone Pager   Eliceo Mccullough 10/27/2022  4:52 [phone]      Reading Providers    Read Date Phone Pager   Maite Fenton Oct 27, 2022  4:52 [phone]        All Reviewers List    KELBY Hernandes NP on 10/28/2022 09:38   KELBY Hernandes NP on 10/28/2022 09:38         XR CHEST (2 VW): Result Notes     KELBY Hernandes NP   10/27/2022  5:16 PM EDT         See My Chart note below                  XR CHEST (2 VW): Patient Communication     Append Comments   Add Notifications       CXR revealed possible patchy pneumonia. Take Doxycycline and other medications as discussed. Keep f/u as scheduled. Call sooner for any new or worsening symptoms. Go to ER for any high fever, trouble breathing, chest pain.    Written by Dani Kaminski KELBY Linder NP on 10/27/2022  5:16 PM EDT  Seen by patient Kateryna Benavides on 10/27/2022  8:31 PM                Radiation Dose Estimates    No radiation information found for this patient      Addendum      Signed by Moo Carrera MD on 10/28/22 0934    Addendum: Addendum: Correction to the body of the report. The airspace    opacity   is in the left lower lung. Narrative   PA AND LATERAL CHEST X-RAY. Clinical Indication: Chronic cough       Comparison: No prior       Findings: 2 views of the chest submitted demonstrate patchy airspace opacity in   the right lower lung concerning for pneumonia. There is no pleural effusion. The   cardiac silhouette and mediastinum are unremarkable. Impression   Left lower lung pneumonia suspected.          Component      Latest Ref Rng & Units 1/5/2023 9/12/2022           8:29 AM  3:23 PM   Vit D, 25-Hydroxy      30.0 - 100.0 ng/mL 23.8 (L) 19.1 (L)     Component      Latest Ref Rng & Units 1/5/2023 12/8/2022           8:29 AM 12:35 PM   Sodium      133 - 143 mmol/L 138 138   Potassium      3.5 - 5.1 mmol/L 4.4 4.4   Chloride      101 - 110 mmol/L 105 102   CO2      21 - 32 mmol/L 29 29   Anion Gap      2 - 11 mmol/L 4 7   Glucose, Random      65 - 100 mg/dL 97 104 (H)   BUN,BUNPL      8 - 23 MG/DL 19 15   Creatinine      0.6 - 1.0 MG/DL 0.90 0.91   Est, Glom Filt Rate      >60 ml/min/1.73m2 >60 60 (L)   CALCIUM, SERUM, [id number]      8.3 - 10.4 MG/DL 9.0 9.3   Bilirubin      0.2 - 1.1 MG/DL 0.5 0.7   ALT      12 - 65 U/L 23 64   AST      15 - 37 U/L 18 54 (H)   Alk Phosphatase      50 - 136 U/L 111 98   Total Protein      6.3 - 8.2 g/dL 6.4 6.5   Albumin      3.2 - 4.6 g/dL 3.9 3.7   Globulin      2.8 - 4.5 g/dL 2.5 (L) 2.8   ALBUMIN/GLOBULIN RATIO      0.4 - 1.6   1.6 1.3     Component      Latest Ref Rng & Units 12/8/2022           3:07 PM   Special Requests       NO SPECIAL REQUESTS   Culture       10,000 to 50,000 COLONIES/mL MIXED SKIN OBDULIO ISOLATED     Component      Latest Ref Rng & Units 12/8/2022 12/8/2022          12:36 PM 12:36 PM   Influenza A Ag      NEG   Negative    Influenza B Ag      NEG   Negative    SOURCE, [id number]       Nasopharyngeal NASAL SWAB   SARS-CoV-2, Rapid      NOTD    Not detected     Component      Latest Ref Rng & Units 12/8/2022          12:35 PM   WBC      4.3 - 11.1 K/uL 7.8   RBC      4.05 - 5.2 M/uL 3.84 (L)   Hemoglobin Quant      11.7 - 15.4 g/dL 12.0   Hematocrit      35.8 - 46.3 % 36.4   MCV      82.0 - 102.0 FL 94.8   MCH      26.1 - 32.9 PG 31.3   MCHC      31.4 - 35.0 g/dL 33.0   RDW      11.9 - 14.6 % 14.3   Platelet Count      592 - 450 K/uL 264   MPV      9.4 - 12.3 FL 10.1   Nucleated Red Blood Cells      0.0 - 0.2 K/uL 0.00   Differential Type       AUTOMATED   Seg Neutrophils      43 - 78 % 71   Lymphocytes      13 - 44 % 20   Monocytes      4.0 - 12.0 % 9   Eosinophils %      0.5 - 7.8 % 0 (L)   Basophils      0.0 - 2.0 % 1   Immature Granulocytes      0.0 - 5.0 % 0   Segs Absolute      1.7 - 8.2 K/UL 5.5   Absolute Lymph #      0.5 - 4.6 K/UL 1.5   Absolute Mono #      0.1 - 1.3 K/UL 0.7   Absolute Eos #      0.0 - 0.8 K/UL 0.0   Basophils Absolute      0.0 - 0.2 K/UL 0.1   Absolute Immature Granulocyte      0.0 - 0.5 K/UL 0.0     Component      Latest Ref Rng & Units 10/12/2022 9/12/2022 9/12/2022           9:08 AM  3:23 PM  3:23 PM   CHOLESTEROL, TOTAL, [id number]      <200 MG/     Triglycerides      35 - 150 MG/DL 71     HDL Cholesterol      40 - 60 MG/DL 90 (H)     LDL Calculated      <100 MG/DL 55.8     VLDL Cholesterol Calculated      6.0 - 23.0 MG/DL 14.2     Chol/HDL Ratio       1.8     T4 Free      0.78 - 1.46 NG/DL   1.1   TSH, 3RD GENERATION      0.358 - 3.740 uIU/mL  2.170      Component      Latest Ref Rng & Units 9/12/2022           3:23 PM   Hepatitis C Ab      NONREACTIVE   NONREACTIVE     PLEASE NOTE:  This document has been produced using voice recognition software.  Unrecognized errors in transcription may be present. On this date 1/12/2023 I have spent 30 minutes reviewing previous notes, test results and face to face with the patient discussing the diagnosis and importance of compliance with the treatment plan as well as documenting on the day of the visit. An electronic signature was used to authenticate this note.   -- KELBY Diego - NP

## 2023-01-13 PROBLEM — J18.9 COMMUNITY ACQUIRED PNEUMONIA OF LEFT LOWER LOBE OF LUNG: Status: RESOLVED | Noted: 2022-10-28 | Resolved: 2023-01-13

## 2023-01-13 PROBLEM — R05.3 CHRONIC COUGH: Status: RESOLVED | Noted: 2022-10-27 | Resolved: 2023-01-13

## 2023-01-13 PROBLEM — B37.2 YEAST DERMATITIS: Status: RESOLVED | Noted: 2022-11-22 | Resolved: 2023-01-13

## 2023-01-13 ASSESSMENT — ENCOUNTER SYMPTOMS
RECTAL PAIN: 0
EYES NEGATIVE: 1
WHEEZING: 0
VOICE CHANGE: 0
EYE DISCHARGE: 0
NAUSEA: 0
RHINORRHEA: 0
COUGH: 0
CHOKING: 0
VOMITING: 0
SHORTNESS OF BREATH: 0
FACIAL SWELLING: 0
SORE THROAT: 0
CONSTIPATION: 0
BACK PAIN: 0
SINUS PAIN: 0
CHEST TIGHTNESS: 0
DIARRHEA: 1
ABDOMINAL PAIN: 0
ANAL BLEEDING: 0
EYE PAIN: 0
APNEA: 0
SINUS PRESSURE: 0
BLOOD IN STOOL: 0
RESPIRATORY NEGATIVE: 1
COLOR CHANGE: 0
ABDOMINAL DISTENTION: 0
STRIDOR: 0
TROUBLE SWALLOWING: 0

## 2023-01-26 NOTE — PROGRESS NOTES
[facility] CARDIOLOGY  [address] [place] [address], [address] [facility], [address] [place] [address]  PHONE: [phone]      23    NAME:  Viji Solis  : 1931  MRN: [id number]         SUBJECTIVE:   Viji Solis is a 80 y.o. female seen for a follow up visit regarding the following:     Chief Complaint   Patient presents with    Hypertension    Hyperlipidemia    Coronary Artery Disease            HPI:  Follow up  Hypertension, Hyperlipidemia, and Coronary Artery Disease   . Follow up CAD, ACEi held at consult visit to see if it would help dry, hacking cough. She's back on it, symptoms having not changed and she was diagnosed with PNA shortly after that visit. They subsequently discovered that she had a host of odd symptoms that ended up being due to recurrent UTI. She will be seeing a urologist next week. PAST CARDIAC HISTORY:  ~- coronary stent in [place] - no records          Key CAD CHF Meds            lisinopril (PRINIVIL;ZESTRIL) 10 MG tablet (Taking)    Sig - Route: Take 1 tablet by mouth daily TAKE 1 TABLET BY MOUTH DAILY FOR 90 DAYS - Oral    ezetimibe (ZETIA) 10 MG tablet (Taking)    Sig - Route: Take 1 tablet by mouth daily - Oral    cholestyramine (QUESTRAN) 4 g packet (Taking)    Sig - Route: Take 1 packet by mouth daily as needed (IBS) - Oral          Key Antihyperglycemic Medications       Patient is on no antihyperglycemic meds. Past Medical History, Past Surgical History, Family history, Social History, and Medications were all reviewed with the patient today and updated as necessary. Prior to Admission medications    Medication Sig Start Date End Date Taking?  Authorizing Provider   fluticasone (FLONASE) 50 MCG/ACT nasal spray 2 sprays by Each Nostril route daily 23  Yes KELBY Almanza NP   valACYclovir (VALTREX) 1 g tablet TAKE 2 TABLETS AT THE ONSET OF THE COLD SORE, AND 2 TABLETS 12 HOURS LATER 23  Yes Ned Guillermo KELBY Linder NP   lisinopril (PRINIVIL;ZESTRIL) 10 MG tablet Take 1 tablet by mouth daily TAKE 1 TABLET BY MOUTH DAILY FOR 90 DAYS 1/12/23  Yes KELBY Vaughn NP   ezetimibe (ZETIA) 10 MG tablet Take 1 tablet by mouth daily 1/12/23  Yes KELBY Vaughn NP   cholestyramine Ancel [place]) 4 g packet Take 1 packet by mouth daily as needed (IBS) 1/12/23  Yes KELBY Vaughn NP   Cholecalciferol (VITAMIN D3) 125 MCG (5000 UT) CAPS Take 1 capsule by mouth daily 1/12/23  Yes KELBY Vaughn NP   cetirizine (ZYRTEC) 5 MG tablet Take 1 tablet by mouth daily 1/12/23  Yes KELBY Vaughn NP   famotidine (PEPCID) 20 MG tablet Take 1 tablet by mouth 2 times daily 12/3/22  Yes Tabitha Malone MD   aspirin EC 81 MG EC tablet Take 1 tablet by mouth daily 10/25/22  Yes Samara Washburn MD   naproxen (NAPROSYN) 500 MG tablet Take 500 mg by mouth as needed for Pain   Yes Historical Provider, MD   Multiple Vitamins-Minerals (THERAPEUTIC MULTIVITAMIN-MINERALS) tablet Take 1 tablet by mouth daily   Yes Historical Provider, MD   benzonatate (TESSALON) 100 MG capsule Take 1 capsule by mouth 3 times daily as needed for Cough  Patient not taking: No sig reported 10/27/22   KELBY Vaughn NP     Allergies   Allergen Reactions    Doxycycline Itching    Seasonal      Past Medical History:   Diagnosis Date    CAD (coronary artery disease) 2008    Had 4 stents    GERD (gastroesophageal reflux disease)     Has been in ER about 2 mo ago for issues Has issues with dry coughing, vomiting,and bad bowels issues    Hearing loss     Been wearing aids for a long time    Hyperlipidemia     Irritable bowel syndrome     Never professionally diagnosed but thinks she has it    Osteoarthritis Many years    Has it in neck, back and hands    Primary hypertension      Past Surgical History:   Procedure Laterality Date    CARDIAC SURGERY  2008    4 stents    COSMETIC SURGERY  Many Eyes, and 3 face lifts    HYSTERECTOMY, TOTAL ABDOMINAL (CERVIX REMOVED)  1972    JOINT REPLACEMENT Bilateral 20-30 yrs ago    Knees     Family History   Problem Relation Age of Onset    Alcohol Abuse Father     Alcohol Abuse Brother     Alcohol Abuse Sister     Alcohol Abuse Brother      Social History     Tobacco Use    Smoking status: Never    Smokeless tobacco: Never   Substance Use Topics    Alcohol use: Yes     Alcohol/week: 14.0 standard drinks     Types: 14 Glasses of wine per week     Comment: Has had a history of bad drinking       ROS:    Review of Systems   Cardiovascular:  Negative for chest pain. Respiratory:  Negative for cough. PHYSICAL EXAM:   BP (!) 148/80   Pulse 76   Ht 5' 2\" (1.575 m)   Wt 130 lb 3.2 oz (59.1 kg)   LMP 01/01/1975   BMI 23.81 kg/m²      Wt Readings from Last 3 Encounters:   01/27/23 130 lb 3.2 oz (59.1 kg)   01/12/23 128 lb 9.6 oz (58.3 kg)   12/08/22 123 lb (55.8 kg)     BP Readings from Last 3 Encounters:   01/27/23 (!) 148/80   01/12/23 132/86   12/08/22 132/74     Pulse Readings from Last 3 Encounters:   01/27/23 76   01/12/23 74   12/08/22 76       Physical Exam  Constitutional:       Appearance: Normal appearance. HENT:      Head: Normocephalic and atraumatic. Eyes:      Conjunctiva/sclera: Conjunctivae normal.   Neck:      Vascular: No carotid bruit. Cardiovascular:      Rate and Rhythm: Normal rate and regular rhythm. Heart sounds: No murmur heard. No friction rub. No gallop. Pulmonary:      Effort: No respiratory distress. Breath sounds: No wheezing or rales. Musculoskeletal:         General: No swelling. Cervical back: Neck supple. Skin:     General: Skin is warm and dry. Neurological:      General: No focal deficit present. Mental Status: She is alert. Psychiatric:         Mood and Affect: Mood normal.         Behavior: Behavior normal.       Medical problems and test results were reviewed with the patient today. DATA REVIEW    LIPID PANEL     Lab Results   Component Value Date    CHOL 160 10/12/2022     Lab Results   Component Value Date    TRIG 71 10/12/2022     Lab Results   Component Value Date    HDL 90 (H) 10/12/2022     Lab Results   Component Value Date    LDLCALC 55.8 10/12/2022     Lab Results   Component Value Date    LABVLDL 14.2 10/12/2022     Lab Results   Component Value Date    CHOLHDLRATIO 1.8 10/12/2022       BMP  Lab Results   Component Value Date/Time     01/05/2023 08:29 AM    K 4.4 01/05/2023 08:29 AM     01/05/2023 08:29 AM    CO2 29 01/05/2023 08:29 AM    BUN 19 01/05/2023 08:29 AM    CREATININE 0.90 01/05/2023 08:29 AM    GLUCOSE 97 01/05/2023 08:29 AM    CALCIUM 9.0 01/05/2023 08:29 AM          EKG        CXR/IMAGING        DEVICE INTERROGATION        OUTSIDE RECORDS REVIEW    Records from outside providers have been reviewed and summarized as noted in the HPI, past history and data review sections of this note, and reviewed with patient. .       ASSESSMENT and PLAN    Tone Morris was seen today for hypertension, hyperlipidemia and coronary artery disease. Diagnoses and all orders for this visit:    Coronary artery disease involving native coronary artery of native heart without angina pectoris    Primary hypertension    Mixed hyperlipidemia    Chronic cough        IMPRESSION:    Lipids at goal, continue meds    BP reasonable, tolerating meds, cough resolved with treatment for bronchitis/PNA    No angina, continue meds        Return in about 6 months (around 7/27/2023). Thank you for allowing me to participate in this patient's care. Please call or contact me if there are any questions or concerns regarding the above.       Jeimy Chatman MD  01/27/23  10:59 AM

## 2023-01-27 ENCOUNTER — OFFICE VISIT (OUTPATIENT)
Dept: CARDIOLOGY CLINIC | Age: 88
End: 2023-01-27
Payer: MEDICARE

## 2023-01-27 VITALS
WEIGHT: 130.2 LBS | BODY MASS INDEX: 23.96 KG/M2 | DIASTOLIC BLOOD PRESSURE: 80 MMHG | HEIGHT: 62 IN | HEART RATE: 76 BPM | SYSTOLIC BLOOD PRESSURE: 148 MMHG

## 2023-01-27 DIAGNOSIS — I25.10 CORONARY ARTERY DISEASE INVOLVING NATIVE CORONARY ARTERY OF NATIVE HEART WITHOUT ANGINA PECTORIS: Primary | ICD-10-CM

## 2023-01-27 DIAGNOSIS — E78.2 MIXED HYPERLIPIDEMIA: ICD-10-CM

## 2023-01-27 DIAGNOSIS — R05.3 CHRONIC COUGH: ICD-10-CM

## 2023-01-27 DIAGNOSIS — I10 PRIMARY HYPERTENSION: ICD-10-CM

## 2023-01-27 PROCEDURE — 99213 OFFICE O/P EST LOW 20 MIN: CPT | Performed by: INTERNAL MEDICINE

## 2023-01-27 PROCEDURE — 1123F ACP DISCUSS/DSCN MKR DOCD: CPT | Performed by: INTERNAL MEDICINE

## 2023-01-27 ASSESSMENT — ENCOUNTER SYMPTOMS: COUGH: 0

## 2023-02-02 ENCOUNTER — OFFICE VISIT (OUTPATIENT)
Dept: UROLOGY | Age: 88
End: 2023-02-02
Payer: MEDICARE

## 2023-02-02 DIAGNOSIS — N39.0 URINARY TRACT INFECTION WITHOUT HEMATURIA, SITE UNSPECIFIED: Primary | ICD-10-CM

## 2023-02-02 LAB
BILIRUBIN, URINE, POC: NEGATIVE
BLOOD URINE, POC: NEGATIVE
GLUCOSE URINE, POC: NEGATIVE
KETONES, URINE, POC: NEGATIVE
LEUKOCYTE ESTERASE, URINE, POC: ABNORMAL
NITRITE, URINE, POC: NEGATIVE
PH, URINE, POC: 6.5 (ref 4.6–8)
PROTEIN,URINE, POC: NEGATIVE
SPECIFIC GRAVITY, URINE, POC: 1.01 (ref 1–1.03)
URINALYSIS CLARITY, POC: ABNORMAL
URINALYSIS COLOR, POC: ABNORMAL
UROBILINOGEN, POC: ABNORMAL

## 2023-02-02 PROCEDURE — 1123F ACP DISCUSS/DSCN MKR DOCD: CPT | Performed by: NURSE PRACTITIONER

## 2023-02-02 PROCEDURE — 99204 OFFICE O/P NEW MOD 45 MIN: CPT | Performed by: NURSE PRACTITIONER

## 2023-02-02 PROCEDURE — 81003 URINALYSIS AUTO W/O SCOPE: CPT | Performed by: NURSE PRACTITIONER

## 2023-02-02 RX ORDER — NITROFURANTOIN 25; 75 MG/1; MG/1
100 CAPSULE ORAL DAILY
Qty: 90 CAPSULE | Refills: 3 | Status: SHIPPED | OUTPATIENT
Start: 2023-02-02

## 2023-02-02 ASSESSMENT — ENCOUNTER SYMPTOMS
CONSTIPATION: 0
WHEEZING: 0
COUGH: 0
BLOOD IN STOOL: 0
BACK PAIN: 0
ABDOMINAL PAIN: 0
DIARRHEA: 0
NAUSEA: 0
SKIN LESIONS: 0
EYE PAIN: 0
VOMITING: 0
INDIGESTION: 0
EYE DISCHARGE: 0
HEARTBURN: 0
SHORTNESS OF BREATH: 0

## 2023-02-02 NOTE — PROGRESS NOTES
[place] [address]  [phone]          Brittney Hay  : 1931    Chief Complaint   Patient presents with    New Patient    Urinary Tract Infection          HPI     Brittney Hay is a 80 y.o. female referred by primary care physician due to recurrent urinary infections. Patient is accompanied by her daughter who assists with the HPI. Patient does have memory issues. The daughter tells me in the last year and a half they have been to the ER twice due to symptoms of altered mental status confusion and lethargy. She also will lose her appetite become nauseated and has worsening balance. With each visit she has been diagnosed with a urinary infection. Once she starts the antibiotic the symptoms resolved pretty quickly. As far as urinary frequency she feels that she voids normally. She does occasionally have urge incontinence. That has improved. She does wear an adult depends but only goes through 2 of these daily typically. There have been no fever chills or gross hematuria. The patient tells me that she would have a rare infections throughout her life but never had them recurrent. She has had 1 urine culture that was in December of last year. The urine culture came back negative. There have been no renal imaging.   Past Medical History:   Diagnosis Date    CAD (coronary artery disease)     Had 4 stents    GERD (gastroesophageal reflux disease)     Has been in ER about 2 mo ago for issues Has issues with dry coughing, vomiting,and bad bowels issues    Hearing loss     Been wearing aids for a long time    Hyperlipidemia     Irritable bowel syndrome     Never professionally diagnosed but thinks she has it    Osteoarthritis Many years    Has it in neck, back and hands    Primary hypertension      Past Surgical History:   Procedure Laterality Date    CARDIAC SURGERY      4 stents    COSMETIC SURGERY  Many    Eyes, and 3 face lifts    HYSTERECTOMY, TOTAL ABDOMINAL (CERVIX REMOVED)  1972    JOINT REPLACEMENT Bilateral 20-30 yrs ago    Knees     Current Outpatient Medications   Medication Sig Dispense Refill    fluticasone (FLONASE) 50 MCG/ACT nasal spray 2 sprays by Each Nostril route daily 16 g 5    valACYclovir (VALTREX) 1 g tablet TAKE 2 TABLETS AT THE ONSET OF THE COLD SORE, AND 2 TABLETS 12 HOURS LATER 4 tablet 5    lisinopril (PRINIVIL;ZESTRIL) 10 MG tablet Take 1 tablet by mouth daily TAKE 1 TABLET BY MOUTH DAILY FOR 90 DAYS 90 tablet 1    ezetimibe (ZETIA) 10 MG tablet Take 1 tablet by mouth daily 90 tablet 1    cholestyramine (QUESTRAN) 4 g packet Take 1 packet by mouth daily as needed (IBS) 90 packet 1    Cholecalciferol (VITAMIN D3) 125 MCG (5000 UT) CAPS Take 1 capsule by mouth daily 90 capsule 1    cetirizine (ZYRTEC) 5 MG tablet Take 1 tablet by mouth daily 90 tablet 1    famotidine (PEPCID) 20 MG tablet Take 1 tablet by mouth 2 times daily 20 tablet 0    aspirin EC 81 MG EC tablet Take 1 tablet by mouth daily 90 tablet 1    naproxen (NAPROSYN) 500 MG tablet Take 500 mg by mouth as needed for Pain      Multiple Vitamins-Minerals (THERAPEUTIC MULTIVITAMIN-MINERALS) tablet Take 1 tablet by mouth daily      benzonatate (TESSALON) 100 MG capsule Take 1 capsule by mouth 3 times daily as needed for Cough (Patient not taking: No sig reported) 30 capsule 2     No current facility-administered medications for this visit. Allergies   Allergen Reactions    Doxycycline Itching    Seasonal      Social History     Socioeconomic History    Marital status:      Spouse name: Not on file    Number of children: Not on file    Years of education: Not on file    Highest education level: Not on file   Occupational History    Not on file   Tobacco Use    Smoking status: Never    Smokeless tobacco: Never   Vaping Use    Vaping Use: Never used   Substance and Sexual Activity    Alcohol use:  Yes     Alcohol/week: 14.0 standard drinks     Types: 14 Glasses of wine per week     Comment: Has had a history of bad drinking    Drug use: Never    Sexual activity: Not Currently   Other Topics Concern    Not on file   Social History Narrative    Not on file     Social Determinants of Health     Financial Resource Strain: Not on file   Food Insecurity: Not on file   Transportation Needs: Not on file   Physical Activity: Inactive    Days of Exercise per Week: 0 days    Minutes of Exercise per Session: 0 min   Stress: Not on file   Social Connections: Not on file   Intimate Partner Violence: Not At Risk    Fear of Current or Ex-Partner: No    Emotionally Abused: No    Physically Abused: No    Sexually Abused: No   Housing Stability: Not on file     Family History   Problem Relation Age of Onset    Alcohol Abuse Father     Alcohol Abuse Brother     Alcohol Abuse Sister     Alcohol Abuse Brother        Review of Systems  Constitutional: Positive for appetite change. Negative for fever, chills, malaise/fatigue, headaches and weight loss. Skin:  Negative for skin lesions, rash and itching. Eyes:  Negative for visual disturbance, eye pain and eye discharge. ENT: Positive for high frequency hearing loss. Negative for difficulty articulating words, pain swallowing and dry mouth. Respiratory:  Negative for cough, blood in sputum, shortness of breath and wheezing. Cardiovascular: Positive for hypertension. Negative for chest pain, irregular heartbeat, leg pain, leg swelling, regular rate and rhythm and varicose veins. GI:  Negative for nausea, vomiting, abdominal pain, blood in stool, constipation, diarrhea, indigestion and heartburn. Genitourinary: Positive for hysterectomy.  Negative for urinary burning, hematuria, flank pain, recurrent UTIs, history of urolithiasis, nocturia, slower stream, straining, urgency, leakage w/ urge, frequent urination, incomplete emptying, sexually transmitted disease, menstrual problem, endometriosis and vaginal pain.Number of pregnancies: 6. Number of births: 3. Musculoskeletal: Positive for arthralgias, muscle weakness and neck pain. Negative for back pain, bone pain and tenderness. Neurological:  Negative for dizziness, focal weakness, numbness, seizures and tremors. Psychological:  Negative for depression and psychiatric problem. Endocrine:  Negative for cold intolerance, thirst, excessive urination, fatigue and heat intolerance. Hem/Lymphatic: Positive for easy bruising. Negative for easy bleeding and frequent infections. Urinalysis  UA - Dipstick  Results for orders placed or performed in visit on 02/02/23   AMB POC URINALYSIS DIP STICK AUTO W/O MICRO   Result Value Ref Range    Color (UA POC)      Clarity (UA POC)      Glucose, Urine, POC Negative Negative    Bilirubin, Urine, POC Negative Negative    KETONES, Urine, POC Negative Negative    Specific Gravity, Urine, POC 1.015 (A) 1.001 - 1.035    Blood (UA POC) Negative Negative    pH, Urine, POC 6.5 4.6 - 8.0    Protein, Urine, POC Negative Negative    Urobilinogen, POC 0.2 mg/dL     Nitrite, Urine, POC Negative Negative    Leukocyte Esterase, Urine, POC Trace Negative     PHYSICAL EXAM    GENERAL: No acute distress, Awake, Alert, Oriented X 3, Gait normal  ABDOMEN: soft, non tender, non-distended, positive bowel sounds, no organomegaly, no palpable masses, no guarding, no rebound tenderness  SKIN: No rash, no erythema, no lacerations or abrasions, no ecchymosis  MUSCULOSKELETAL - MAEW, balance is off some and uses a rolling walker, no edema       Assessment and Plan    ICD-10-CM    1. Urinary tract infection without hematuria, site unspecified  N39.0 AMB POC URINALYSIS DIP STICK AUTO W/O MICRO        Reasons for urinary infection were discussed in detail. Pt was instructed that females are more prone to infections than men. We reviewed genital hygiene as she should wipe from front to back. I recommend showers over bath.  Pt should void after intercourse and she should try to drink plenty of water. PLAN:  PACO will be obtained  Add Macrobid 100mg po daily    KELBY Rankin NP, Dr. is supervising physician today and he approves plan of care. Return in about 8 weeks (around 3/30/2023) for for recheck. Elements of this note have been dictated using speech recognition software. Although reviewed, errors of speech recognition may have occurred.

## 2023-02-05 LAB
BACTERIA SPEC CULT: NORMAL
SERVICE CMNT-IMP: NORMAL

## 2023-02-10 ENCOUNTER — HOSPITAL ENCOUNTER (OUTPATIENT)
Dept: ULTRASOUND IMAGING | Age: 88
Discharge: HOME | End: 2023-02-13

## 2023-02-10 DIAGNOSIS — N39.0 URINARY TRACT INFECTION WITHOUT HEMATURIA, SITE UNSPECIFIED: ICD-10-CM

## 2023-04-04 ENCOUNTER — OFFICE VISIT (OUTPATIENT)
Dept: UROLOGY | Age: 88
End: 2023-04-04
Payer: MEDICARE

## 2023-04-04 DIAGNOSIS — N39.0 URINARY TRACT INFECTION WITHOUT HEMATURIA, SITE UNSPECIFIED: Primary | ICD-10-CM

## 2023-04-04 LAB
BILIRUBIN, URINE, POC: NEGATIVE
BLOOD URINE, POC: NEGATIVE
GLUCOSE URINE, POC: NEGATIVE
KETONES, URINE, POC: NEGATIVE
LEUKOCYTE ESTERASE, URINE, POC: NEGATIVE
NITRITE, URINE, POC: NEGATIVE
PH, URINE, POC: 7 (ref 4.6–8)
PROTEIN,URINE, POC: NEGATIVE
SPECIFIC GRAVITY, URINE, POC: 1.01 (ref 1–1.03)
URINALYSIS CLARITY, POC: NORMAL
URINALYSIS COLOR, POC: NORMAL
UROBILINOGEN, POC: NORMAL

## 2023-04-04 PROCEDURE — 99213 OFFICE O/P EST LOW 20 MIN: CPT | Performed by: NURSE PRACTITIONER

## 2023-04-04 PROCEDURE — 81003 URINALYSIS AUTO W/O SCOPE: CPT | Performed by: NURSE PRACTITIONER

## 2023-04-04 PROCEDURE — 1123F ACP DISCUSS/DSCN MKR DOCD: CPT | Performed by: NURSE PRACTITIONER

## 2023-04-04 ASSESSMENT — ENCOUNTER SYMPTOMS
BACK PAIN: 0
NAUSEA: 0

## 2023-04-04 NOTE — PROGRESS NOTES
[place] [address] [place] [address], [facility] [address]  [phone]          Christa Morris  : 1931    Chief Complaint   Patient presents with    Follow-up          HPI     Christa Morris is a 80 y.o. female  Returns today for follow-up on recurrent urinary infection. At her last visit she was given Macrobid to take daily. The daughter tells me that the patient has missed several doses but has been at least getting in 3 a week. Her renal ultrasound that was obtained revealed her kidneys to be normal.  She is back today for follow-up    Initially she was  referred by primary care physician due to recurrent urinary infections. Patient is accompanied by her daughter who assists with the HPI. Patient does have memory issues. The daughter tells me in the last year and a half they have been to the ER twice due to symptoms of altered mental status confusion and lethargy. She also will lose her appetite become nauseated and has worsening balance. With each visit she has been diagnosed with a urinary infection. Once she starts the antibiotic the symptoms resolved pretty quickly. As far as urinary frequency she feels that she voids normally. She does occasionally have urge incontinence. That has improved. She does wear an adult depends but only goes through 2 of these daily typically. There have been no fever chills or gross hematuria. The patient tells me that she would have a rare infections throughout her life but never had them recurrent.            Past Medical History:   Diagnosis Date    CAD (coronary artery disease)     Had 4 stents    GERD (gastroesophageal reflux disease)     Has been in ER about 2 mo ago for issues Has issues with dry coughing, vomiting,and bad bowels issues    Hearing loss     Been wearing aids for a long time    Hyperlipidemia     Irritable bowel syndrome     Never professionally diagnosed but thinks she has it    Osteoarthritis

## 2023-04-18 ENCOUNTER — NURSE ONLY (OUTPATIENT)
Dept: FAMILY MEDICINE CLINIC | Facility: CLINIC | Age: 88
End: 2023-04-18
Payer: MEDICARE

## 2023-04-18 DIAGNOSIS — E55.9 VITAMIN D DEFICIENCY: ICD-10-CM

## 2023-04-18 DIAGNOSIS — I10 PRIMARY HYPERTENSION: ICD-10-CM

## 2023-04-18 DIAGNOSIS — K58.0 IRRITABLE BOWEL SYNDROME WITH DIARRHEA: ICD-10-CM

## 2023-04-18 DIAGNOSIS — N28.9 RENAL INSUFFICIENCY: ICD-10-CM

## 2023-04-18 DIAGNOSIS — R41.3 MEMORY LOSS: ICD-10-CM

## 2023-04-18 DIAGNOSIS — G91.9 HYDROCEPHALUS, UNSPECIFIED TYPE (HCC): ICD-10-CM

## 2023-04-18 DIAGNOSIS — K21.9 GASTROESOPHAGEAL REFLUX DISEASE WITHOUT ESOPHAGITIS: ICD-10-CM

## 2023-04-18 DIAGNOSIS — E78.5 HYPERLIPIDEMIA, UNSPECIFIED HYPERLIPIDEMIA TYPE: ICD-10-CM

## 2023-04-18 LAB
25(OH)D3 SERPL-MCNC: 32.2 NG/ML (ref 30–100)
ALBUMIN SERPL-MCNC: 3.5 G/DL (ref 3.2–4.6)
ALBUMIN/GLOB SERPL: 1.4 (ref 0.4–1.6)
ALP SERPL-CCNC: 90 U/L (ref 50–136)
ALT SERPL-CCNC: 41 U/L (ref 12–65)
ANION GAP SERPL CALC-SCNC: 4 MMOL/L (ref 2–11)
AST SERPL-CCNC: 46 U/L (ref 15–37)
BASOPHILS # BLD: 0.1 K/UL (ref 0–0.2)
BASOPHILS NFR BLD: 1 % (ref 0–2)
BILIRUB SERPL-MCNC: 0.5 MG/DL (ref 0.2–1.1)
BUN SERPL-MCNC: 15 MG/DL (ref 8–23)
CALCIUM SERPL-MCNC: 9.2 MG/DL (ref 8.3–10.4)
CHLORIDE SERPL-SCNC: 107 MMOL/L (ref 101–110)
CHOLEST SERPL-MCNC: 170 MG/DL
CO2 SERPL-SCNC: 26 MMOL/L (ref 21–32)
CREAT SERPL-MCNC: 0.9 MG/DL (ref 0.6–1)
DIFFERENTIAL METHOD BLD: ABNORMAL
EOSINOPHIL # BLD: 0.2 K/UL (ref 0–0.8)
EOSINOPHIL NFR BLD: 4 % (ref 0.5–7.8)
ERYTHROCYTE [DISTWIDTH] IN BLOOD BY AUTOMATED COUNT: 13.2 % (ref 11.9–14.6)
GLOBULIN SER CALC-MCNC: 2.5 G/DL (ref 2.8–4.5)
GLUCOSE SERPL-MCNC: 95 MG/DL (ref 65–100)
HCT VFR BLD AUTO: 37.1 % (ref 35.8–46.3)
HDLC SERPL-MCNC: 112 MG/DL (ref 40–60)
HDLC SERPL: 1.5
HGB BLD-MCNC: 12.5 G/DL (ref 11.7–15.4)
IMM GRANULOCYTES # BLD AUTO: 0 K/UL (ref 0–0.5)
IMM GRANULOCYTES NFR BLD AUTO: 0 % (ref 0–5)
LDLC SERPL CALC-MCNC: 47.8 MG/DL
LYMPHOCYTES # BLD: 1.6 K/UL (ref 0.5–4.6)
LYMPHOCYTES NFR BLD: 32 % (ref 13–44)
MCH RBC QN AUTO: 31.7 PG (ref 26.1–32.9)
MCHC RBC AUTO-ENTMCNC: 33.7 G/DL (ref 31.4–35)
MCV RBC AUTO: 94.2 FL (ref 82–102)
MONOCYTES # BLD: 0.6 K/UL (ref 0.1–1.3)
MONOCYTES NFR BLD: 12 % (ref 4–12)
NEUTS SEG # BLD: 2.6 K/UL (ref 1.7–8.2)
NEUTS SEG NFR BLD: 51 % (ref 43–78)
NRBC # BLD: 0 K/UL (ref 0–0.2)
PLATELET # BLD AUTO: 208 K/UL (ref 150–450)
PMV BLD AUTO: 10.4 FL (ref 9.4–12.3)
POTASSIUM SERPL-SCNC: 4.4 MMOL/L (ref 3.5–5.1)
PROT SERPL-MCNC: 6 G/DL (ref 6.3–8.2)
RBC # BLD AUTO: 3.94 M/UL (ref 4.05–5.2)
SODIUM SERPL-SCNC: 137 MMOL/L (ref 133–143)
TRIGL SERPL-MCNC: 51 MG/DL (ref 35–150)
TSH W FREE THYROID IF ABNORMAL: 2.33 UIU/ML (ref 0.36–3.74)
VLDLC SERPL CALC-MCNC: 10.2 MG/DL (ref 6–23)
WBC # BLD AUTO: 5.1 K/UL (ref 4.3–11.1)

## 2023-04-18 PROCEDURE — 36415 COLL VENOUS BLD VENIPUNCTURE: CPT | Performed by: NURSE PRACTITIONER

## 2023-04-19 PROBLEM — R74.01 ELEVATED AST (SGOT): Status: ACTIVE | Noted: 2023-04-19

## 2023-04-25 ENCOUNTER — OFFICE VISIT (OUTPATIENT)
Dept: FAMILY MEDICINE CLINIC | Facility: CLINIC | Age: 88
End: 2023-04-25
Payer: MEDICARE

## 2023-04-25 VITALS
SYSTOLIC BLOOD PRESSURE: 138 MMHG | WEIGHT: 130.8 LBS | HEIGHT: 62 IN | OXYGEN SATURATION: 95 % | DIASTOLIC BLOOD PRESSURE: 88 MMHG | HEART RATE: 84 BPM | RESPIRATION RATE: 16 BRPM | BODY MASS INDEX: 24.07 KG/M2

## 2023-04-25 DIAGNOSIS — I10 PRIMARY HYPERTENSION: ICD-10-CM

## 2023-04-25 DIAGNOSIS — L57.0 ACTINIC KERATOSIS: ICD-10-CM

## 2023-04-25 DIAGNOSIS — N28.9 RENAL INSUFFICIENCY: ICD-10-CM

## 2023-04-25 DIAGNOSIS — R74.01 ELEVATED AST (SGOT): ICD-10-CM

## 2023-04-25 DIAGNOSIS — Z91.81 AT HIGH RISK FOR FALLS: ICD-10-CM

## 2023-04-25 DIAGNOSIS — R09.82 POST-NASAL DRIP: ICD-10-CM

## 2023-04-25 DIAGNOSIS — H91.93 BILATERAL HEARING LOSS, UNSPECIFIED HEARING LOSS TYPE: ICD-10-CM

## 2023-04-25 DIAGNOSIS — J34.89 SINUS PRESSURE: ICD-10-CM

## 2023-04-25 DIAGNOSIS — E78.5 HYPERLIPIDEMIA, UNSPECIFIED HYPERLIPIDEMIA TYPE: ICD-10-CM

## 2023-04-25 DIAGNOSIS — R09.81 NASAL CONGESTION: ICD-10-CM

## 2023-04-25 DIAGNOSIS — K58.0 IRRITABLE BOWEL SYNDROME WITH DIARRHEA: ICD-10-CM

## 2023-04-25 DIAGNOSIS — J01.00 ACUTE MAXILLARY SINUSITIS, RECURRENCE NOT SPECIFIED: Primary | ICD-10-CM

## 2023-04-25 DIAGNOSIS — Z23 ENCOUNTER FOR IMMUNIZATION: ICD-10-CM

## 2023-04-25 DIAGNOSIS — E55.9 VITAMIN D DEFICIENCY: ICD-10-CM

## 2023-04-25 DIAGNOSIS — M19.90 OSTEOARTHRITIS, UNSPECIFIED OSTEOARTHRITIS TYPE, UNSPECIFIED SITE: ICD-10-CM

## 2023-04-25 DIAGNOSIS — J30.9 ALLERGIC RHINITIS, UNSPECIFIED SEASONALITY, UNSPECIFIED TRIGGER: ICD-10-CM

## 2023-04-25 DIAGNOSIS — K21.9 GASTROESOPHAGEAL REFLUX DISEASE WITHOUT ESOPHAGITIS: ICD-10-CM

## 2023-04-25 DIAGNOSIS — R41.3 MEMORY LOSS: ICD-10-CM

## 2023-04-25 DIAGNOSIS — N39.0 FREQUENT UTI: ICD-10-CM

## 2023-04-25 DIAGNOSIS — I25.10 CORONARY ARTERY DISEASE INVOLVING NATIVE CORONARY ARTERY OF NATIVE HEART WITHOUT ANGINA PECTORIS: ICD-10-CM

## 2023-04-25 DIAGNOSIS — B00.1 COLD SORE: ICD-10-CM

## 2023-04-25 PROCEDURE — 1123F ACP DISCUSS/DSCN MKR DOCD: CPT | Performed by: NURSE PRACTITIONER

## 2023-04-25 PROCEDURE — 99214 OFFICE O/P EST MOD 30 MIN: CPT | Performed by: NURSE PRACTITIONER

## 2023-04-25 RX ORDER — CHOLESTYRAMINE 4 G/9G
1 POWDER, FOR SUSPENSION ORAL DAILY PRN
Qty: 90 PACKET | Refills: 1 | Status: SHIPPED | OUTPATIENT
Start: 2023-04-25

## 2023-04-25 RX ORDER — OXYBUTYNIN CHLORIDE 5 MG/1
TABLET ORAL
COMMUNITY
Start: 2023-02-17 | End: 2023-04-25

## 2023-04-25 RX ORDER — CETIRIZINE HYDROCHLORIDE 5 MG/1
5 TABLET ORAL DAILY
Qty: 90 TABLET | Refills: 1 | Status: SHIPPED | OUTPATIENT
Start: 2023-04-25

## 2023-04-25 RX ORDER — LISINOPRIL 10 MG/1
10 TABLET ORAL DAILY
Qty: 90 TABLET | Refills: 1 | Status: SHIPPED | OUTPATIENT
Start: 2023-04-25

## 2023-04-25 RX ORDER — AMLODIPINE BESYLATE 5 MG/1
5 TABLET ORAL DAILY
Qty: 90 TABLET | Refills: 1 | Status: SHIPPED | OUTPATIENT
Start: 2023-04-25

## 2023-04-25 RX ORDER — CEPHALEXIN 500 MG/1
500 CAPSULE ORAL 2 TIMES DAILY
Qty: 14 CAPSULE | Refills: 0 | Status: SHIPPED | OUTPATIENT
Start: 2023-04-25 | End: 2023-05-02

## 2023-04-25 RX ORDER — EZETIMIBE 10 MG/1
10 TABLET ORAL DAILY
Qty: 90 TABLET | Refills: 1 | Status: SHIPPED | OUTPATIENT
Start: 2023-04-25

## 2023-04-25 RX ORDER — VALACYCLOVIR HYDROCHLORIDE 1 G/1
TABLET, FILM COATED ORAL
Qty: 4 TABLET | Refills: 5 | Status: SHIPPED | OUTPATIENT
Start: 2023-04-25

## 2023-04-25 RX ORDER — FLUTICASONE PROPIONATE 50 MCG
2 SPRAY, SUSPENSION (ML) NASAL DAILY
Qty: 16 G | Refills: 5 | Status: SHIPPED | OUTPATIENT
Start: 2023-04-25

## 2023-04-25 RX ORDER — ASPIRIN 81 MG/1
81 TABLET ORAL DAILY
Qty: 90 TABLET | Refills: 1 | Status: SHIPPED | OUTPATIENT
Start: 2023-04-25

## 2023-04-25 SDOH — ECONOMIC STABILITY: TRANSPORTATION INSECURITY
IN THE PAST 12 MONTHS, HAS LACK OF TRANSPORTATION KEPT YOU FROM MEETINGS, WORK, OR FROM GETTING THINGS NEEDED FOR DAILY LIVING?: NO

## 2023-04-25 SDOH — ECONOMIC STABILITY: FOOD INSECURITY: WITHIN THE PAST 12 MONTHS, THE FOOD YOU BOUGHT JUST DIDN'T LAST AND YOU DIDN'T HAVE MONEY TO GET MORE.: NEVER TRUE

## 2023-04-25 SDOH — ECONOMIC STABILITY: FOOD INSECURITY: WITHIN THE PAST 12 MONTHS, YOU WORRIED THAT YOUR FOOD WOULD RUN OUT BEFORE YOU GOT MONEY TO BUY MORE.: NEVER TRUE

## 2023-04-25 SDOH — ECONOMIC STABILITY: INCOME INSECURITY: HOW HARD IS IT FOR YOU TO PAY FOR THE VERY BASICS LIKE FOOD, HOUSING, MEDICAL CARE, AND HEATING?: PATIENT DECLINED

## 2023-04-25 SDOH — ECONOMIC STABILITY: HOUSING INSECURITY
IN THE LAST 12 MONTHS, WAS THERE A TIME WHEN YOU DID NOT HAVE A STEADY PLACE TO SLEEP OR SLEPT IN A SHELTER (INCLUDING NOW)?: NO

## 2023-04-25 ASSESSMENT — PATIENT HEALTH QUESTIONNAIRE - PHQ9
2. FEELING DOWN, DEPRESSED OR HOPELESS: 0
SUM OF ALL RESPONSES TO PHQ QUESTIONS 1-9: 0
SUM OF ALL RESPONSES TO PHQ9 QUESTIONS 1 & 2: 0
1. LITTLE INTEREST OR PLEASURE IN DOING THINGS: 0
SUM OF ALL RESPONSES TO PHQ QUESTIONS 1-9: 0

## 2023-04-25 NOTE — PROGRESS NOTES
[address] [facility] [address], [address] [facility]  Phone: [phone] Fax [phone]  Ange Carmen. Niyah MS, APRN, FNP-C  4/25/2023  Chief Complaint   Patient presents with    Follow-up     Pt here today (accompanied by her daughter who helps care for pt and helped pt answer some questions today) for a f/u recheck multiple chronic conditions. Pt/daughter report pt following POC/taking medications as directed. Please see ROS/Assessment and Plan section for full details of all items addressed during today's appointment. Pt has one main concern today-see below      Pt has allergic rhinitis. Pt reports taking Flonase and Zyrtec as directed. Over past few weeks, pt reports having increased nasal congestion with yellow d/c, scratchy sore throat-PND, and bilat maxillary sinus pressure/pain. Pt denies any fever, chills, body aches, cough, SOB, wheezing, CP, N/V/D/abd pain associated. ASSESSMENT/PLAN:  Below is the assessment and plan developed based on review of pertinent history, physical exam, labs, studies, and medications. 1. Acute maxillary sinusitis, recurrence not specified  Pt has allergic rhinitis. Pt reports taking Flonase and Zyrtec as directed. Over past few weeks, pt reports having increased nasal congestion with yellow d/c, scratchy sore throat-PND, and bilat maxillary sinus pressure/pain. Pt denies any fever, chills, body aches, cough, SOB, wheezing, CP, N/V/D/abd pain associated. Discussed with pt/daughter. Symptoms/physical exam findings c/w acute maxillary sinusitis. Will continue/refill Flonase and Zyrtec at same doses. Will have pt take PRN OTC Mucinex as directed. Checked allergies. Will cover with Keflex 500 mg po bid for 7 days. Pt can f/u PRN for acute maxillary sinusitis-call in a few days if no better or sooner for concerns/new or worsening symptoms. Agrees to go to ER for severe symptoms as discussed. - cephALEXin (KEFLEX) 500 MG capsule;  Take 1 capsule by

## 2023-04-26 LAB
ALBUMIN SERPL-MCNC: 3.8 G/DL (ref 3.2–4.6)
ALBUMIN/GLOB SERPL: 1.4 (ref 0.4–1.6)
ALP SERPL-CCNC: 105 U/L (ref 50–136)
ALT SERPL-CCNC: 39 U/L (ref 12–65)
AST SERPL-CCNC: 31 U/L (ref 15–37)
BILIRUB DIRECT SERPL-MCNC: 0.1 MG/DL
BILIRUB SERPL-MCNC: 0.4 MG/DL (ref 0.2–1.1)
GLOBULIN SER CALC-MCNC: 2.7 G/DL (ref 2.8–4.5)
PROT SERPL-MCNC: 6.5 G/DL (ref 6.3–8.2)

## 2023-04-30 ASSESSMENT — ENCOUNTER SYMPTOMS
SHORTNESS OF BREATH: 0
RESPIRATORY NEGATIVE: 1
RHINORRHEA: 1
ANAL BLEEDING: 0
NAUSEA: 0
SINUS PRESSURE: 1
SINUS PAIN: 1
FACIAL SWELLING: 0
TROUBLE SWALLOWING: 0
APNEA: 0
EYE DISCHARGE: 0
DIARRHEA: 1
CONSTIPATION: 0
STRIDOR: 0
BLOOD IN STOOL: 0
COUGH: 0
VOICE CHANGE: 0
EYES NEGATIVE: 1
CHOKING: 0
BACK PAIN: 1
EYE PAIN: 0
COLOR CHANGE: 0
VOMITING: 0
ABDOMINAL DISTENTION: 0
CHEST TIGHTNESS: 0
ABDOMINAL PAIN: 0
SORE THROAT: 1
RECTAL PAIN: 0
WHEEZING: 0

## 2023-05-04 ENCOUNTER — APPOINTMENT (RX ONLY)
Dept: URBAN - METROPOLITAN AREA CLINIC 329 | Facility: CLINIC | Age: 88
Setting detail: DERMATOLOGY
End: 2023-05-04

## 2023-05-04 DIAGNOSIS — L57.0 ACTINIC KERATOSIS: ICD-10-CM | Status: INADEQUATELY CONTROLLED

## 2023-05-04 DIAGNOSIS — L85.3 XEROSIS CUTIS: ICD-10-CM

## 2023-05-04 DIAGNOSIS — L57.8 OTHER SKIN CHANGES DUE TO CHRONIC EXPOSURE TO NONIONIZING RADIATION: ICD-10-CM

## 2023-05-04 PROCEDURE — ? TREATMENT REGIMEN

## 2023-05-04 PROCEDURE — 17000 DESTRUCT PREMALG LESION: CPT

## 2023-05-04 PROCEDURE — ? SUNSCREEN RECOMMENDATIONS

## 2023-05-04 PROCEDURE — ? LIQUID NITROGEN

## 2023-05-04 PROCEDURE — 17003 DESTRUCT PREMALG LES 2-14: CPT

## 2023-05-04 PROCEDURE — 99203 OFFICE O/P NEW LOW 30 MIN: CPT | Mod: 25

## 2023-05-04 PROCEDURE — ? COUNSELING

## 2023-05-04 ASSESSMENT — LOCATION DETAILED DESCRIPTION DERM
LOCATION DETAILED: SUBXIPHOID
LOCATION DETAILED: RIGHT INFERIOR CENTRAL MALAR CHEEK
LOCATION DETAILED: LEFT INFERIOR LATERAL FOREHEAD
LOCATION DETAILED: LEFT INFERIOR CENTRAL MALAR CHEEK
LOCATION DETAILED: LEFT SUPERIOR CENTRAL BUCCAL CHEEK

## 2023-05-04 ASSESSMENT — LOCATION SIMPLE DESCRIPTION DERM
LOCATION SIMPLE: LEFT CHEEK
LOCATION SIMPLE: RIGHT CHEEK
LOCATION SIMPLE: LEFT FOREHEAD
LOCATION SIMPLE: ABDOMEN

## 2023-05-04 ASSESSMENT — LOCATION ZONE DERM
LOCATION ZONE: TRUNK
LOCATION ZONE: FACE

## 2023-05-04 NOTE — PROCEDURE: SUNSCREEN RECOMMENDATIONS
LOS: 2 days   Patient Care Team:  Everette Rubio MD as PCP - General (Internal Medicine)  Elissa Mcmahan MD as Consulting Physician (Cardiology)    Chief Complaint:     F/u chf    Interval History:     Her breathing is better.  She did get up and walk with therapy today.  She has no chest pain or dizziness.    Echo done 2/16/2023  Interpretation Summary       •  Left ventricular systolic function is hyperdynamic (EF > 70%). Left ventricular ejection fraction appears to be greater than 70%.  •  The left ventricular cavity is small in size.  •  Left ventricular wall thickness is consistent with hypertrophy. Sigmoid-shaped ventricular septum is present.  •  Left ventricular diastolic function was indeterminate.  •  Mildly reduced right ventricular systolic function noted.  •  The right ventricular cavity is mild to moderately dilated.  •  Left atrial volume is severely increased.  •  The right atrial cavity is severely  dilated.  •  Moderate mitral valve stenosis is present.  •  Estimated right ventricular systolic pressure from tricuspid regurgitation is moderately elevated (45-55 mmHg).  •  Moderate pulmonary hypertension is present.  •  There is a moderate sized right pleural effusion.  •  Trace aortic valve regurgitation is present. There is a 23 mm TAVR valve present. The aortic valve peak and mean gradients are within defined limits. The prosthetic aortic valve is normal. [email or web address]      Objective   Vital Signs  Temp:  [97.5 °F (36.4 °C)-97.9 °F (36.6 °C)] 97.7 °F (36.5 °C)  Heart Rate:  [69-83] 69  Resp:  [16-18] 16  BP: (119-129)/(60-99) 121/99    Intake/Output Summary (Last 24 hours) at 2/16/2023 0904  Last data filed at 2/16/2023 0404  Gross per 24 hour   Intake 300 ml   Output 275 ml   Net 25 ml       Comfortable NAD  Neck supple, no JVD or thyromegaly appreciated  S1/S2 RRR, no m/r/g  Lungs CTA B, normal effort  Abdomen S/NT/ND (+) BS, no HSM appreciated  Extremities warm, no clubbing, cyanosis, 
or edema  No visible or palpable skin lesions  A/Ox4, mood and affect appropriate    Results Review:      Results from last 7 days   Lab Units 02/16/23  0348 02/15/23  0427 02/14/23  1641   SODIUM mmol/L 146* 143 142   POTASSIUM mmol/L 3.6 4.3 4.1   CHLORIDE mmol/L 104 101 102   CO2 mmol/L 29.8* 30.0* 29.0   BUN mg/dL 41* 34* 33*   CREATININE mg/dL 0.90 0.88 0.78   GLUCOSE mg/dL 129* 129* 161*   CALCIUM mg/dL 9.4 9.5 9.3     Results from last 7 days   Lab Units 02/14/23  1836 02/14/23  1641   HSTROP T ng/L 23* 34*     Results from last 7 days   Lab Units 02/16/23  0347 02/15/23  0427 02/14/23  1641   WBC 10*3/mm3 6.03 6.63 7.22   HEMOGLOBIN g/dL 11.1* 11.9* 12.3   HEMATOCRIT % 34.2 36.8 37.6   PLATELETS 10*3/mm3 181 173 196     Results from last 7 days   Lab Units 02/14/23  1641   INR  1.10   APTT seconds 29.4         Results from last 7 days   Lab Units 02/16/23  0348   MAGNESIUM mg/dL 2.2           I reviewed the patient's new clinical results.  I personally viewed and interpreted the patient's EKG/Telemetry data        Medication Review:   apixaban, 2.5 mg, Oral, Q12H  atorvastatin, 20 mg, Oral, Daily  bumetanide, 1 mg, Oral, Daily  ferrous sulfate, 1 mL, Oral, Daily With Breakfast  insulin lispro, 0-9 Units, Subcutaneous, TID AC  memantine, 10 mg, Oral, Q12H  multivitamin with minerals, 1 tablet, Oral, Daily  pantoprazole, 40 mg, Oral, Q AM  sodium chloride, 10 mL, Intravenous, Q12H             Assessment & Plan       Acute on chronic congestive heart failure, unspecified heart failure type (HCC)    S/P TAVR (transcatheter aortic valve replacement)    Type 2 diabetes mellitus with hyperglycemia, without long-term current use of insulin (HCC)    Stroke (CMS/HCC) - history of    Longstanding persistent atrial fibrillation (CMS/HCC)    Pulmonary hypertension (HCC)    Dementia (HCC)    Chronic anticoagulation    Sleep apnea    1. Dyspnea, appears to be due to decompensated diastolic heart failure-worsened by 
valvular heart disease with atrial fibrillation.  She is responded nicely to diuresis.  Will check echocardiogram.  Her vital have been well controlled.  Renal function is normal.  2. Chronic HFpEF  3. Permanent atrial fibrillation, on Eliquis  4. TAVR, stable  5. Mitral stenosis  6. Frailty  7. Dementia  8. Moderate right pleural effusion    At this time, I think she is actually doing fairly well.  She wants to get home.  She has a birthday and she can be 89.  She has really extensive diastolic heart failure that is not can be easy to treat because she has a small left ventricle with LVH, stiffness and valvular disease.  At this time, I think we will try to make sure that we keep her blood pressure and heart rate well controlled but diuresing her extensively is going to make the problem worse. Try low dose toprol at night only.  She has stopped the in the past due to fatigue.  Use Bumex as needed.    See my APRN in 1 month in the office.    Elissa Mcmahan MD  02/16/23  09:04 EST      
General Sunscreen Counseling: I recommended a broad spectrum sunscreen with a SPF of 30 or higher.  I explained that SPF 30 sunscreens block approximately 97 percent of the sun's harmful rays.  Sunscreens should be applied at least 15 minutes prior to expected sun exposure and then every 2 hours after that as long as sun exposure continues. If swimming or exercising sunscreen should be reapplied every 45 minutes to an hour after getting wet or sweating.  One ounce, or the equivalent of a shot glass full of sunscreen, is adequate to protect the skin not covered by a bathing suit. I also recommended a lip balm with a sunscreen as well. Sun protective clothing can be used in lieu of sunscreen but must be worn the entire time you are exposed to the sun's rays.
Detail Level: Detailed

## 2023-05-04 NOTE — HPI: RASH
Is This A New Presentation, Or A Follow-Up?: Rash
Additional History: Patient has memory issues and she is not very clear with her symptoms. Daughter is present but she is unsure of what she puts on her face. PCP thinks this is all sun related.

## 2023-07-10 ENCOUNTER — PATIENT MESSAGE (OUTPATIENT)
Dept: UROLOGY | Age: 88
End: 2023-07-10

## 2023-07-10 RX ORDER — OXYBUTYNIN CHLORIDE 5 MG/1
5 TABLET, EXTENDED RELEASE ORAL DAILY
Qty: 90 TABLET | Refills: 3 | Status: SHIPPED | OUTPATIENT
Start: 2023-07-10

## 2023-07-17 ENCOUNTER — PATIENT MESSAGE (OUTPATIENT)
Dept: UROLOGY | Age: 88
End: 2023-07-17

## 2023-07-18 RX ORDER — OXYBUTYNIN CHLORIDE 5 MG/1
5 TABLET ORAL 2 TIMES DAILY
Qty: 180 TABLET | Refills: 3 | Status: SHIPPED | OUTPATIENT
Start: 2023-07-18

## 2023-07-27 NOTE — PROGRESS NOTES
[facility] CARDIOLOGY  [address], [address]  PHONE: [phone]      23    NAME:  Artie Strong  : 1931  MRN: [id number]         SUBJECTIVE:   Artie Strong is a 80 y.o. female seen for a follow up visit regarding the following:     Chief Complaint   Patient presents with    Follow-up     6 months    Coronary Artery Disease    Hyperlipidemia    Hypertension            HPI:  Follow up  Follow-up (6 months), Coronary Artery Disease, Hyperlipidemia, and Hypertension   . Follow up CAD. She continues to do great. No angina. Always on the go, per her daughter she rarely sits down. Lives at the [place], socially engaged, has been enjoying streamed versions of the Doctor [employer]. PAST CARDIAC HISTORY:  ~- coronary stent in [place] - no records          Key CAD CHF Meds            lisinopril (PRINIVIL;ZESTRIL) 10 MG tablet (Taking)    Sig - Route: Take 1 tablet by mouth daily TAKE 1 TABLET BY MOUTH DAILY FOR 90 DAYS - Oral    ezetimibe (ZETIA) 10 MG tablet (Taking)    Sig - Route: Take 1 tablet by mouth daily - Oral    cholestyramine (QUESTRAN) 4 g packet (Taking)    Sig - Route: Take 1 packet by mouth daily as needed (IBS) - Oral    amLODIPine (NORVASC) 5 MG tablet    Sig - Route: Take 1 tablet by mouth daily - Oral    Patient not taking: Reported on 2023          Key Antihyperglycemic Medications       Patient is on no antihyperglycemic meds. Past Medical History, Past Surgical History, Family history, Social History, and Medications were all reviewed with the patient today and updated as necessary. Prior to Admission medications    Medication Sig Start Date End Date Taking?  Authorizing Provider   oxybutynin (DITROPAN) 5 MG tablet Take 1 tablet by mouth in the morning and at bedtime 23  Yes KELBY Peterson - CARMEN   fluticasone [facility]) 50 MCG/ACT nasal spray 2 sprays by Each Nostril route daily 23  Yes

## 2023-07-28 ENCOUNTER — OFFICE VISIT (OUTPATIENT)
Age: 88
End: 2023-07-28
Payer: COMMERCIAL

## 2023-07-28 VITALS
DIASTOLIC BLOOD PRESSURE: 80 MMHG | SYSTOLIC BLOOD PRESSURE: 130 MMHG | HEIGHT: 62 IN | BODY MASS INDEX: 23.37 KG/M2 | WEIGHT: 127 LBS | HEART RATE: 64 BPM

## 2023-07-28 DIAGNOSIS — I25.10 CORONARY ARTERY DISEASE INVOLVING NATIVE CORONARY ARTERY OF NATIVE HEART WITHOUT ANGINA PECTORIS: Primary | ICD-10-CM

## 2023-07-28 DIAGNOSIS — I10 PRIMARY HYPERTENSION: ICD-10-CM

## 2023-07-28 DIAGNOSIS — E78.5 DYSLIPIDEMIA: ICD-10-CM

## 2023-07-28 DIAGNOSIS — Z78.9 STATIN INTOLERANCE: ICD-10-CM

## 2023-07-28 PROCEDURE — 1123F ACP DISCUSS/DSCN MKR DOCD: CPT | Performed by: INTERNAL MEDICINE

## 2023-07-28 PROCEDURE — 99214 OFFICE O/P EST MOD 30 MIN: CPT | Performed by: INTERNAL MEDICINE

## 2023-07-28 ASSESSMENT — ENCOUNTER SYMPTOMS: SHORTNESS OF BREATH: 0

## 2023-08-16 DIAGNOSIS — B00.1 COLD SORE: ICD-10-CM

## 2023-08-16 RX ORDER — VALACYCLOVIR HYDROCHLORIDE 1 G/1
TABLET, FILM COATED ORAL
Qty: 4 TABLET | Refills: 5 | Status: SHIPPED | OUTPATIENT
Start: 2023-08-16

## 2023-08-22 ENCOUNTER — HOSPITAL ENCOUNTER (OUTPATIENT)
Dept: GENERAL RADIOLOGY | Age: 88
Discharge: HOME | End: 2023-08-25

## 2023-08-22 ENCOUNTER — OFFICE VISIT (OUTPATIENT)
Dept: FAMILY MEDICINE CLINIC | Facility: CLINIC | Age: 88
End: 2023-08-22
Payer: MEDICARE

## 2023-08-22 VITALS
SYSTOLIC BLOOD PRESSURE: 120 MMHG | OXYGEN SATURATION: 98 % | HEART RATE: 84 BPM | DIASTOLIC BLOOD PRESSURE: 68 MMHG | HEIGHT: 62 IN | BODY MASS INDEX: 23.11 KG/M2 | WEIGHT: 125.6 LBS | RESPIRATION RATE: 18 BRPM

## 2023-08-22 DIAGNOSIS — T14.8XXA HEMATOMA: ICD-10-CM

## 2023-08-22 DIAGNOSIS — B00.1 COLD SORE: ICD-10-CM

## 2023-08-22 DIAGNOSIS — K58.0 IRRITABLE BOWEL SYNDROME WITH DIARRHEA: ICD-10-CM

## 2023-08-22 DIAGNOSIS — N39.0 FREQUENT UTI: ICD-10-CM

## 2023-08-22 DIAGNOSIS — H91.93 BILATERAL HEARING LOSS, UNSPECIFIED HEARING LOSS TYPE: ICD-10-CM

## 2023-08-22 DIAGNOSIS — M25.512 ACUTE PAIN OF LEFT SHOULDER: ICD-10-CM

## 2023-08-22 DIAGNOSIS — J30.9 ALLERGIC RHINITIS, UNSPECIFIED SEASONALITY, UNSPECIFIED TRIGGER: ICD-10-CM

## 2023-08-22 DIAGNOSIS — S20.212A CONTUSION OF RIB ON LEFT SIDE, INITIAL ENCOUNTER: ICD-10-CM

## 2023-08-22 DIAGNOSIS — I25.10 CORONARY ARTERY DISEASE INVOLVING NATIVE CORONARY ARTERY OF NATIVE HEART WITHOUT ANGINA PECTORIS: ICD-10-CM

## 2023-08-22 DIAGNOSIS — K21.9 GASTROESOPHAGEAL REFLUX DISEASE WITHOUT ESOPHAGITIS: ICD-10-CM

## 2023-08-22 DIAGNOSIS — N28.9 RENAL INSUFFICIENCY: ICD-10-CM

## 2023-08-22 DIAGNOSIS — R41.3 MEMORY LOSS: ICD-10-CM

## 2023-08-22 DIAGNOSIS — E55.9 VITAMIN D DEFICIENCY: ICD-10-CM

## 2023-08-22 DIAGNOSIS — M54.50 ACUTE LEFT-SIDED LOW BACK PAIN WITHOUT SCIATICA: ICD-10-CM

## 2023-08-22 DIAGNOSIS — Z23 ENCOUNTER FOR IMMUNIZATION: ICD-10-CM

## 2023-08-22 DIAGNOSIS — E78.5 HYPERLIPIDEMIA, UNSPECIFIED HYPERLIPIDEMIA TYPE: ICD-10-CM

## 2023-08-22 DIAGNOSIS — I10 PRIMARY HYPERTENSION: ICD-10-CM
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
## 2023-08-22 PROBLEM — R74.01 ELEVATED AST (SGOT): Status: RESOLVED | Noted: 2023-04-19 | Resolved: 2023-08-22

## 2023-08-22 PROBLEM — S22.32XA LEFT RIB FRACTURE: Status: ACTIVE | Noted: 2023-08-22

## 2023-08-22 PROBLEM — M43.9 COMPRESSION DEFORMITY OF VERTEBRA: Status: ACTIVE | Noted: 2023-08-22

## 2023-08-22 PROCEDURE — 99213 OFFICE O/P EST LOW 20 MIN: CPT | Performed by: NURSE PRACTITIONER

## 2023-08-22 PROCEDURE — 1123F ACP DISCUSS/DSCN MKR DOCD: CPT | Performed by: NURSE PRACTITIONER

## 2023-08-22 ASSESSMENT — PATIENT HEALTH QUESTIONNAIRE - PHQ9
SUM OF ALL RESPONSES TO PHQ QUESTIONS 1-9: 0
SUM OF ALL RESPONSES TO PHQ9 QUESTIONS 1 & 2: 0
1. LITTLE INTEREST OR PLEASURE IN DOING THINGS: 0
2. FEELING DOWN, DEPRESSED OR HOPELESS: 0
SUM OF ALL RESPONSES TO PHQ QUESTIONS 1-9: 0

## 2023-08-22 NOTE — PROGRESS NOTES
[address] [address], [address]  Phone: [phone] Fax [phone]  Radha Ovalles. Niyah MS, APRN, FNP-C  8/22/2023  Chief Complaint   Patient presents with    Fall     See below      Pt reports having 2 glasses of wine last Tuesday and got up from the table in her dining room. Pt reports that she was trying to turn her rolling walker around and got caught on the carpet, lost her balance, and fell backwards hitting her left shoulder left lateral ribs, left lower back, and back of head on the carpet. Pt denies LOC. Pt has small hematoma to the back of her head and some minor bruises/pain to left shoulder (but reports normal ROM to left shoulder despite pain), left lateral ribs, and left lower back. Pt denies and headaches or dizziness after hitting her head. Pt denies any neck pain beyond her baseline OA pain. Pt denies any SOB, wheezing, cough, hemoptysis. ASSESSMENT/PLAN:  Below is the assessment and plan developed based on review of pertinent history, physical exam, labs, studies, and medications. 1. Fall, initial encounter  Pt reports having 2 glasses of wine last Tuesday and got up from the table in her dining room. Pt reports that she was trying to turn her rolling walker around and got caught on the carpet, lost her balance, and fell backwards hitting her left shoulder left lateral ribs, left lower back, and back of head on the carpet. Pt denies LOC. Pt has small hematoma to the back of her head and some minor bruises/pain to left shoulder (but reports normal ROM to left shoulder despite pain), left lateral ribs, and left lower back. Pt denies and headaches or dizziness after hitting her head. Pt denies any neck pain beyond her baseline OA pain. Pt denies any SOB, wheezing, cough, hemoptysis. Pt had no focal weakness or neuro defs on physical exam today. Chest rise and fall equal bilat. Pt able to take deep breaths. Lungs CTA. RA sat WNL 98%.  Discussed with

## 2023-08-25 ASSESSMENT — ENCOUNTER SYMPTOMS
CHOKING: 0
RESPIRATORY NEGATIVE: 1
VOMITING: 0
EYE PAIN: 0
SINUS PRESSURE: 0
BLOOD IN STOOL: 0
RHINORRHEA: 0
CHEST TIGHTNESS: 0
APNEA: 0
WHEEZING: 0
DIARRHEA: 1
EYES NEGATIVE: 1
ABDOMINAL DISTENTION: 0
SORE THROAT: 0
VOICE CHANGE: 0
FACIAL SWELLING: 0
ABDOMINAL PAIN: 0
BACK PAIN: 1
COUGH: 0
EYE DISCHARGE: 0
TROUBLE SWALLOWING: 0
ANAL BLEEDING: 0
COLOR CHANGE: 0
SHORTNESS OF BREATH: 0
RECTAL PAIN: 0
SINUS PAIN: 0
CONSTIPATION: 0
STRIDOR: 0
NAUSEA: 0

## 2023-10-09 ENCOUNTER — OFFICE VISIT (OUTPATIENT)
Dept: UROLOGY | Age: 88
End: 2023-10-09
Payer: MEDICARE

## 2023-10-09 DIAGNOSIS — N39.0 URINARY TRACT INFECTION WITHOUT HEMATURIA, SITE UNSPECIFIED: Primary | ICD-10-CM

## 2023-10-09 DIAGNOSIS — R39.15 URINARY URGENCY: ICD-10-CM

## 2023-10-09 PROCEDURE — 99214 OFFICE O/P EST MOD 30 MIN: CPT | Performed by: NURSE PRACTITIONER

## 2023-10-09 PROCEDURE — 1123F ACP DISCUSS/DSCN MKR DOCD: CPT | Performed by: NURSE PRACTITIONER

## 2023-10-09 RX ORDER — NITROFURANTOIN 25; 75 MG/1; MG/1
100 CAPSULE ORAL DAILY
Qty: 90 CAPSULE | Refills: 3 | Status: SHIPPED | OUTPATIENT
Start: 2023-10-09

## 2023-10-09 RX ORDER — OXYBUTYNIN CHLORIDE 5 MG/1
5 TABLET ORAL 2 TIMES DAILY
Qty: 180 TABLET | Refills: 3 | Status: SHIPPED | OUTPATIENT
Start: 2023-10-09

## 2023-10-09 ASSESSMENT — ENCOUNTER SYMPTOMS: NAUSEA: 0

## 2023-10-09 NOTE — PROGRESS NOTES
[address]  [phone]          Mirella Ambrose  : 1931    Chief Complaint   Patient presents with    Follow-up          HPI     Mirella Ambrose is a 80 y.o. female    Returns today for follow-up on recurrent urinary infection. She continues daily antibiotic suppression of Macrobid. The daughter tells me that the patient has missed several doses but has been at least getting in 3 a week. Patient is asymptomatic today. No urinary symptoms. Initially she was  referred by primary care physician due to recurrent urinary infections. Patient is accompanied by her daughter who assists with the HPI. Patient does have memory issues. The daughter tells me in the last year and a half they have been to the ER twice due to symptoms of altered mental status confusion and lethargy. She also will lose her appetite become nauseated and has worsening balance. With each visit she has been diagnosed with a urinary infection. Once she starts the antibiotic the symptoms resolved pretty quickly. As far as urinary frequency she feels that she voids normally. She does occasionally have urge incontinence. It is usually problematic when she does wear an adult depends but only goes through 2 of these daily typically. There have been no fever chills or gross hematuria. The patient tells me that she would have a rare infections throughout her life but never had them recurrent.      Past Medical History:   Diagnosis Date    CAD (coronary artery disease)     Had 4 stents    GERD (gastroesophageal reflux disease)     Has been in ER about 2 mo ago for issues Has issues with dry coughing, vomiting,and bad bowels issues    Hearing loss     Been wearing aids for a long time    Hyperlipidemia     Irritable bowel syndrome     Never professionally diagnosed but thinks she has it    Osteoarthritis Many years    Has it in neck, back and

## 2023-10-27 ENCOUNTER — OFFICE VISIT (OUTPATIENT)
Dept: FAMILY MEDICINE CLINIC | Facility: CLINIC | Age: 88
End: 2023-10-27

## 2023-10-27 VITALS
OXYGEN SATURATION: 98 % | BODY MASS INDEX: 23.19 KG/M2 | RESPIRATION RATE: 16 BRPM | HEART RATE: 78 BPM | SYSTOLIC BLOOD PRESSURE: 120 MMHG | DIASTOLIC BLOOD PRESSURE: 78 MMHG | HEIGHT: 62 IN | WEIGHT: 126 LBS

## 2023-10-27 DIAGNOSIS — E78.2 MIXED HYPERLIPIDEMIA: ICD-10-CM

## 2023-10-27 DIAGNOSIS — M19.90 OSTEOARTHRITIS, UNSPECIFIED OSTEOARTHRITIS TYPE, UNSPECIFIED SITE: ICD-10-CM

## 2023-10-27 DIAGNOSIS — R41.3 MEMORY LOSS: ICD-10-CM

## 2023-10-27 DIAGNOSIS — Z23 ENCOUNTER FOR IMMUNIZATION: ICD-10-CM

## 2023-10-27 DIAGNOSIS — K21.9 GASTROESOPHAGEAL REFLUX DISEASE WITHOUT ESOPHAGITIS: ICD-10-CM

## 2023-10-27 DIAGNOSIS — E55.9 VITAMIN D DEFICIENCY: ICD-10-CM

## 2023-10-27 DIAGNOSIS — Z91.81 AT HIGH RISK FOR FALLS: ICD-10-CM

## 2023-10-27 DIAGNOSIS — N39.0 FREQUENT UTI: ICD-10-CM

## 2023-10-27 DIAGNOSIS — I10 PRIMARY HYPERTENSION: Primary | ICD-10-CM

## 2023-10-27 DIAGNOSIS — I25.10 CORONARY ARTERY DISEASE INVOLVING NATIVE CORONARY ARTERY OF NATIVE HEART WITHOUT ANGINA PECTORIS: ICD-10-CM

## 2023-10-27 DIAGNOSIS — H91.93 BILATERAL HEARING LOSS, UNSPECIFIED HEARING LOSS TYPE: ICD-10-CM

## 2023-10-27 DIAGNOSIS — K58.0 IRRITABLE BOWEL SYNDROME WITH DIARRHEA: ICD-10-CM

## 2023-10-27 DIAGNOSIS — B00.1 COLD SORE: ICD-10-CM

## 2023-10-27 DIAGNOSIS — J30.9 ALLERGIC RHINITIS, UNSPECIFIED SEASONALITY, UNSPECIFIED TRIGGER: ICD-10-CM

## 2023-10-27 DIAGNOSIS — N28.9 RENAL INSUFFICIENCY: ICD-10-CM

## 2023-10-27 RX ORDER — MULTIVIT-MIN/IRON/FOLIC ACID/K 18-600-40
1 CAPSULE ORAL DAILY
Qty: 90 CAPSULE | Refills: 1 | Status: SHIPPED | OUTPATIENT
Start: 2023-10-27

## 2023-10-27 RX ORDER — ASPIRIN 81 MG/1
81 TABLET ORAL DAILY
Qty: 90 TABLET | Refills: 1 | Status: SHIPPED | OUTPATIENT
Start: 2023-10-27

## 2023-10-27 RX ORDER — FLUTICASONE PROPIONATE 50 MCG
2 SPRAY, SUSPENSION (ML) NASAL DAILY
Qty: 16 G | Refills: 5 | Status: SHIPPED | OUTPATIENT
Start: 2023-10-27

## 2023-10-27 RX ORDER — CHOLESTYRAMINE 4 G/9G
1 POWDER, FOR SUSPENSION ORAL DAILY PRN
Qty: 90 PACKET | Refills: 1 | Status: SHIPPED | OUTPATIENT
Start: 2023-10-27

## 2023-10-27 RX ORDER — LISINOPRIL 10 MG/1
10 TABLET ORAL DAILY
Qty: 90 TABLET | Refills: 1 | Status: SHIPPED | OUTPATIENT
Start: 2023-10-27

## 2023-10-27 RX ORDER — EZETIMIBE 10 MG/1
10 TABLET ORAL DAILY
Qty: 90 TABLET | Refills: 1 | Status: SHIPPED | OUTPATIENT
Start: 2023-10-27

## 2023-10-27 RX ORDER — CETIRIZINE HYDROCHLORIDE 5 MG/1
5 TABLET ORAL DAILY
Qty: 90 TABLET | Refills: 1 | Status: SHIPPED | OUTPATIENT
Start: 2023-10-27

## 2023-10-27 RX ORDER — VALACYCLOVIR HYDROCHLORIDE 1 G/1
TABLET, FILM COATED ORAL
Qty: 4 TABLET | Refills: 5 | Status: SHIPPED | OUTPATIENT
Start: 2023-10-27

## 2023-10-27 NOTE — PROGRESS NOTES
VLDL Cholesterol Calculated      6.0 - 23.0 MG/DL 13.4    Chol/HDL Ratio        1.7       Component      Latest Ref Rng 10/17/2023   Vit D, 25-Hydroxy      30.0 - 100.0 ng/mL 85.7        Component      Latest Ref Rng 10/17/2023   WBC      4.3 - 11.1 K/uL 5.8    RBC      4.05 - 5.2 M/uL 3.91 (L)    Hemoglobin Quant      11.7 - 15.4 g/dL 12.6    Hematocrit      35.8 - 46.3 % 37.9    MCV      82 - 102 FL 96.9    MCH      26.1 - 32.9 PG 32.2    MCHC      31.4 - 35.0 g/dL 33.2    RDW      11.9 - 14.6 % 12.9    Platelet Count      845 - 450 K/uL 220    MPV      9.4 - 12.3 FL 10.3    Nucleated Red Blood Cells      0.0 - 0.2 K/uL 0.00    Differential Type        AUTOMATED    Neutrophils %      43 - 78 % 56    Lymphocyte %      13 - 44 % 30    Monocytes %      4.0 - 12.0 % 10    Eosinophils %      0.5 - 7.8 % 3    Basophils %      0.0 - 2.0 % 1    Immature Granulocytes      0.0 - 5.0 % 0    Neutrophils Absolute      1.7 - 8.2 K/UL 3.3    Lymphocytes Absolute      0.5 - 4.6 K/UL 1.7    Monocytes Absolute      0.1 - 1.3 K/UL 0.6    Eosinophils Absolute      0.0 - 0.8 K/UL 0.2    Basophils Absolute      0.0 - 0.2 K/UL 0.0    Absolute Immature Granulocyte      0.0 - 0.5 K/UL 0.0       Dx: Acute left-sided low back pain withou. ..     1 Result Note  Details    Reading Physician Reading Date Result Priority   Rell Gibbs MD  [phone] 8/22/2023      Narrative & Impression  History: Recent fall with left-sided trauma     LUMBAR SPINE AP AND LATERAL VIEWS: Levoconvex lumbar scoliosis is present with  multilevel degenerative spondylosis. There are old compression deformities at  L1, L2, and L3 that are similar in appearance to May 2022 CT. LEFT RIB SERIES: There is an age-indeterminate left seventh rib fracture. Included portion of the left lung is clear. LEFT SHOULDER SERIES: Advanced glenohumeral osteoarthritis is present. Included  portion of the left lung is clear.  There is no displaced fracture

## 2023-11-05 ASSESSMENT — ENCOUNTER SYMPTOMS
TROUBLE SWALLOWING: 0
DIARRHEA: 1
ANAL BLEEDING: 0
SINUS PRESSURE: 0
BLOOD IN STOOL: 0
SHORTNESS OF BREATH: 0
SINUS PAIN: 0
VOMITING: 0
RESPIRATORY NEGATIVE: 1
VOICE CHANGE: 0
BACK PAIN: 1
COUGH: 0
CHOKING: 0
WHEEZING: 0
EYE DISCHARGE: 0
EYE PAIN: 0
COLOR CHANGE: 0
SORE THROAT: 0
FACIAL SWELLING: 0
APNEA: 0
ABDOMINAL DISTENTION: 0
ABDOMINAL PAIN: 0
STRIDOR: 0
NAUSEA: 0
CONSTIPATION: 0
RECTAL PAIN: 0
RHINORRHEA: 0
EYES NEGATIVE: 1
CHEST TIGHTNESS: 0

## 2024-01-03 DIAGNOSIS — E55.9 VITAMIN D DEFICIENCY: ICD-10-CM

## 2024-01-17 DIAGNOSIS — E55.9 VITAMIN D DEFICIENCY: ICD-10-CM

## 2024-01-18 RX ORDER — MULTIVIT-MIN/IRON/FOLIC ACID/K 18-600-40
1 CAPSULE ORAL DAILY
Qty: 90 CAPSULE | Refills: 1 | OUTPATIENT
Start: 2024-01-18

## 2024-01-26 NOTE — PROGRESS NOTES
JOINT REPLACEMENT Bilateral 20-30 yrs ago    Knees     Family History   Problem Relation Age of Onset    Alcohol Abuse Father     Alcohol Abuse Brother     Alcohol Abuse Sister     Alcohol Abuse Brother      Social History     Tobacco Use    Smoking status: Never    Smokeless tobacco: Never   Substance Use Topics    Alcohol use: Yes     Alcohol/week: 14.0 standard drinks of alcohol     Types: 14 Glasses of wine per week     Comment: Has had a history of bad drinking…       ROS:    Review of Systems   Cardiovascular:  Negative for chest pain.   Respiratory:  Negative for shortness of breath.           PHYSICAL EXAM:   /78   Pulse 66   Ht 1.575 m (5' 2\")   Wt 59.6 kg (131 lb 6.4 oz)   LMP 01/01/1975   BMI 24.03 kg/m²      Wt Readings from Last 3 Encounters:   01/29/24 59.6 kg (131 lb 6.4 oz)   12/22/23 57.2 kg (126 lb)   10/27/23 57.2 kg (126 lb)     BP Readings from Last 3 Encounters:   01/29/24 138/78   12/22/23 132/80   10/27/23 120/78     Pulse Readings from Last 3 Encounters:   01/29/24 66   12/22/23 96   10/27/23 78           Physical Exam  Constitutional:       Appearance: Normal appearance.   HENT:      Head: Normocephalic and atraumatic.   Eyes:      Conjunctiva/sclera: Conjunctivae normal.   Neck:      Vascular: No carotid bruit.   Cardiovascular:      Rate and Rhythm: Normal rate and regular rhythm.      Heart sounds: No murmur heard.     No friction rub. No gallop.   Pulmonary:      Effort: No respiratory distress.      Breath sounds: No wheezing or rales.   Musculoskeletal:         General: No swelling.      Cervical back: Neck supple.   Skin:     General: Skin is warm and dry.   Neurological:      General: No focal deficit present.      Mental Status: She is alert.   Psychiatric:         Mood and Affect: Mood normal.         Behavior: Behavior normal.       Medical problems and test results were reviewed with the patient today.     DATA REVIEW    LIPID PANEL     Lab Results   Component

## 2024-01-29 ENCOUNTER — OFFICE VISIT (OUTPATIENT)
Age: 89
End: 2024-01-29
Payer: MEDICARE

## 2024-01-29 VITALS
BODY MASS INDEX: 24.18 KG/M2 | HEART RATE: 66 BPM | DIASTOLIC BLOOD PRESSURE: 78 MMHG | SYSTOLIC BLOOD PRESSURE: 138 MMHG | WEIGHT: 131.4 LBS | HEIGHT: 62 IN

## 2024-01-29 DIAGNOSIS — I10 PRIMARY HYPERTENSION: ICD-10-CM

## 2024-01-29 DIAGNOSIS — I25.118 CORONARY ARTERY DISEASE OF NATIVE ARTERY OF NATIVE HEART WITH STABLE ANGINA PECTORIS (HCC): Primary | ICD-10-CM

## 2024-01-29 PROCEDURE — 93000 ELECTROCARDIOGRAM COMPLETE: CPT | Performed by: INTERNAL MEDICINE

## 2024-01-29 PROCEDURE — 1123F ACP DISCUSS/DSCN MKR DOCD: CPT | Performed by: INTERNAL MEDICINE

## 2024-01-29 PROCEDURE — 99214 OFFICE O/P EST MOD 30 MIN: CPT | Performed by: INTERNAL MEDICINE

## 2024-01-29 ASSESSMENT — ENCOUNTER SYMPTOMS: SHORTNESS OF BREATH: 0

## 2024-03-01 ENCOUNTER — TELEPHONE (OUTPATIENT)
Dept: FAMILY MEDICINE CLINIC | Facility: CLINIC | Age: 89
End: 2024-03-01

## 2024-03-01 DIAGNOSIS — R11.2 NAUSEA AND VOMITING, UNSPECIFIED VOMITING TYPE: Primary | ICD-10-CM

## 2024-03-01 RX ORDER — ONDANSETRON 4 MG/1
4 TABLET, FILM COATED ORAL 3 TIMES DAILY PRN
Qty: 15 TABLET | Refills: 0 | Status: SHIPPED | OUTPATIENT
Start: 2024-03-01 | End: 2024-03-06

## 2024-03-01 NOTE — TELEPHONE ENCOUNTER
Daughter called and stated that patient has been exposed to noro virus and has not been able to keep food down. Wants to know if you can call in some nausea medicine.

## 2024-04-05 ENCOUNTER — OFFICE VISIT (OUTPATIENT)
Dept: UROLOGY | Age: 89
End: 2024-04-05
Payer: MEDICARE

## 2024-04-05 DIAGNOSIS — R11.0 NAUSEA: ICD-10-CM

## 2024-04-05 DIAGNOSIS — R41.0 CONFUSION: ICD-10-CM

## 2024-04-05 DIAGNOSIS — N39.0 URINARY TRACT INFECTION WITHOUT HEMATURIA, SITE UNSPECIFIED: Primary | ICD-10-CM

## 2024-04-05 LAB
BILIRUBIN, URINE, POC: NEGATIVE
BLOOD URINE, POC: NEGATIVE
GLUCOSE URINE, POC: NEGATIVE
KETONES, URINE, POC: 15
LEUKOCYTE ESTERASE, URINE, POC: NEGATIVE
NITRITE, URINE, POC: NEGATIVE
PH, URINE, POC: 7 (ref 4.6–8)
PROTEIN,URINE, POC: NEGATIVE
SPECIFIC GRAVITY, URINE, POC: 1.02 (ref 1–1.03)
URINALYSIS CLARITY, POC: NORMAL
URINALYSIS COLOR, POC: NORMAL
UROBILINOGEN, POC: NORMAL

## 2024-04-05 PROCEDURE — 81003 URINALYSIS AUTO W/O SCOPE: CPT | Performed by: NURSE PRACTITIONER

## 2024-04-05 NOTE — PROGRESS NOTES
UA - Dipstick  Results for orders placed or performed in visit on 04/05/24   AMB POC URINALYSIS DIP STICK AUTO W/O MICRO   Result Value Ref Range    Color (UA POC)      Clarity (UA POC)      Glucose, Urine, POC Negative     Bilirubin, Urine, POC Negative     KETONES, Urine, POC 15     Specific Gravity, Urine, POC 1.020 1.001 - 1.035    Blood (UA POC) Negative     pH, Urine, POC 7.0 4.6 - 8.0    Protein, Urine, POC Negative     Urobilinogen, POC 0.2 mg/dL     Nitrite, Urine, POC Negative     Leukocyte Esterase, Urine, POC Negative        UA - Micro  WBC - 0  RBC - 0  Bacteria - 0  Epith - 0      Requested Prescriptions      No prescriptions requested or ordered in this encounter     Plan    Diagnosis Orders   1. Urinary tract infection without hematuria, site unspecified        2. Nausea  AMB POC URINALYSIS DIP STICK AUTO W/O MICRO      3. Confusion  AMB POC URINALYSIS DIP STICK AUTO W/O MICRO        Urine is normal.

## 2024-04-09 ENCOUNTER — OFFICE VISIT (OUTPATIENT)
Dept: UROLOGY | Age: 89
End: 2024-04-09
Payer: MEDICARE

## 2024-04-09 DIAGNOSIS — R39.15 URINARY URGENCY: ICD-10-CM

## 2024-04-09 DIAGNOSIS — N39.0 URINARY TRACT INFECTION WITHOUT HEMATURIA, SITE UNSPECIFIED: Primary | ICD-10-CM

## 2024-04-09 PROCEDURE — 1123F ACP DISCUSS/DSCN MKR DOCD: CPT | Performed by: NURSE PRACTITIONER

## 2024-04-09 PROCEDURE — 99214 OFFICE O/P EST MOD 30 MIN: CPT | Performed by: NURSE PRACTITIONER

## 2024-04-09 RX ORDER — SOLIFENACIN SUCCINATE 5 MG/1
5 TABLET, FILM COATED ORAL DAILY
Qty: 90 TABLET | Refills: 3 | Status: SHIPPED | OUTPATIENT
Start: 2024-04-09 | End: 2025-04-09

## 2024-04-09 ASSESSMENT — ENCOUNTER SYMPTOMS
NAUSEA: 0
BACK PAIN: 0

## 2024-04-09 NOTE — PROGRESS NOTES
for a long time    Hyperlipidemia     Irritable bowel syndrome     Never professionally diagnosed but thinks she has it    Osteoarthritis Many years    Has it in neck, back and hands    Primary hypertension      Past Surgical History:   Procedure Laterality Date    CARDIAC SURGERY  2008    4 stents    COSMETIC SURGERY  Many    Eyes, and 3 face lifts    HYSTERECTOMY, TOTAL ABDOMINAL (CERVIX REMOVED)  1972    JOINT REPLACEMENT Bilateral 20-30 yrs ago    Knees     Current Outpatient Medications   Medication Sig Dispense Refill    solifenacin (VESICARE) 5 MG tablet Take 1 tablet by mouth daily 90 tablet 3    Cholecalciferol (VITAMIN D) 50 MCG (2000 UT) CAPS capsule Take 1 capsule by mouth daily 90 capsule 1    aspirin 81 MG EC tablet Take 1 tablet by mouth daily 90 tablet 1    valACYclovir (VALTREX) 1 g tablet TAKE 2 TABLETS AT THE ONSET OF THE COLD SORE, AND 2 TABLETS 12 HOURS LATER 4 tablet 5    fluticasone (FLONASE) 50 MCG/ACT nasal spray 2 sprays by Each Nostril route daily 16 g 5    lisinopril (PRINIVIL;ZESTRIL) 10 MG tablet Take 1 tablet by mouth daily TAKE 1 TABLET BY MOUTH DAILY FOR 90 DAYS 90 tablet 1    ezetimibe (ZETIA) 10 MG tablet Take 1 tablet by mouth daily 90 tablet 1    cholestyramine (QUESTRAN) 4 g packet Take 1 packet by mouth daily as needed (IBS) 90 packet 1    cetirizine (ZYRTEC) 5 MG tablet Take 1 tablet by mouth daily 90 tablet 1    oxybutynin (DITROPAN) 5 MG tablet Take 1 tablet by mouth in the morning and at bedtime 180 tablet 3    nitrofurantoin, macrocrystal-monohydrate, (MACROBID) 100 MG capsule Take 1 capsule by mouth Daily 90 capsule 3    famotidine (PEPCID) 20 MG tablet Take 1 tablet by mouth 2 times daily 20 tablet 0    naproxen (NAPROSYN) 500 MG tablet Take 1 tablet by mouth as needed for Pain       No current facility-administered medications for this visit.     Allergies   Allergen Reactions    Doxycycline Itching    Seasonal      Social History     Socioeconomic History    Marital

## 2024-04-12 ENCOUNTER — OFFICE VISIT (OUTPATIENT)
Dept: FAMILY MEDICINE CLINIC | Facility: CLINIC | Age: 89
End: 2024-04-12
Payer: MEDICARE

## 2024-04-12 VITALS
WEIGHT: 124 LBS | BODY MASS INDEX: 22.82 KG/M2 | HEIGHT: 62 IN | HEART RATE: 74 BPM | RESPIRATION RATE: 16 BRPM | SYSTOLIC BLOOD PRESSURE: 138 MMHG | OXYGEN SATURATION: 96 % | DIASTOLIC BLOOD PRESSURE: 84 MMHG

## 2024-04-12 DIAGNOSIS — I25.10 CORONARY ARTERY DISEASE INVOLVING NATIVE CORONARY ARTERY OF NATIVE HEART WITHOUT ANGINA PECTORIS: ICD-10-CM

## 2024-04-12 DIAGNOSIS — E55.9 VITAMIN D DEFICIENCY: ICD-10-CM

## 2024-04-12 DIAGNOSIS — I10 PRIMARY HYPERTENSION: ICD-10-CM

## 2024-04-12 DIAGNOSIS — M19.90 OSTEOARTHRITIS, UNSPECIFIED OSTEOARTHRITIS TYPE, UNSPECIFIED SITE: ICD-10-CM

## 2024-04-12 DIAGNOSIS — J30.9 ALLERGIC RHINITIS, UNSPECIFIED SEASONALITY, UNSPECIFIED TRIGGER: ICD-10-CM

## 2024-04-12 DIAGNOSIS — K58.0 IRRITABLE BOWEL SYNDROME WITH DIARRHEA: ICD-10-CM

## 2024-04-12 DIAGNOSIS — Z23 ENCOUNTER FOR IMMUNIZATION: ICD-10-CM

## 2024-04-12 DIAGNOSIS — R41.3 MEMORY LOSS: Primary | ICD-10-CM

## 2024-04-12 DIAGNOSIS — E78.2 MIXED HYPERLIPIDEMIA: ICD-10-CM

## 2024-04-12 DIAGNOSIS — B00.1 COLD SORE: ICD-10-CM

## 2024-04-12 DIAGNOSIS — H91.93 BILATERAL HEARING LOSS, UNSPECIFIED HEARING LOSS TYPE: ICD-10-CM

## 2024-04-12 DIAGNOSIS — K21.9 GASTROESOPHAGEAL REFLUX DISEASE WITHOUT ESOPHAGITIS: ICD-10-CM

## 2024-04-12 DIAGNOSIS — N39.0 FREQUENT UTI: ICD-10-CM

## 2024-04-12 LAB
25(OH)D3 SERPL-MCNC: 60.5 NG/ML (ref 30–100)
ALBUMIN SERPL-MCNC: 3.5 G/DL (ref 3.2–4.6)
ALBUMIN/GLOB SERPL: 1.4 (ref 0.4–1.6)
ALP SERPL-CCNC: 87 U/L (ref 50–136)
ALT SERPL-CCNC: 34 U/L (ref 12–65)
ANION GAP SERPL CALC-SCNC: 3 MMOL/L (ref 2–11)
AST SERPL-CCNC: 27 U/L (ref 15–37)
BASOPHILS # BLD: 0 K/UL (ref 0–0.2)
BASOPHILS NFR BLD: 0 % (ref 0–2)
BILIRUB SERPL-MCNC: 0.3 MG/DL (ref 0.2–1.1)
BUN SERPL-MCNC: 16 MG/DL (ref 8–23)
CALCIUM SERPL-MCNC: 8.7 MG/DL (ref 8.3–10.4)
CHLORIDE SERPL-SCNC: 106 MMOL/L (ref 103–113)
CO2 SERPL-SCNC: 28 MMOL/L (ref 21–32)
CREAT SERPL-MCNC: 0.9 MG/DL (ref 0.6–1)
DIFFERENTIAL METHOD BLD: ABNORMAL
EOSINOPHIL # BLD: 0.2 K/UL (ref 0–0.8)
EOSINOPHIL NFR BLD: 3 % (ref 0.5–7.8)
ERYTHROCYTE [DISTWIDTH] IN BLOOD BY AUTOMATED COUNT: 13.2 % (ref 11.9–14.6)
GLOBULIN SER CALC-MCNC: 2.5 G/DL (ref 2.8–4.5)
GLUCOSE SERPL-MCNC: 92 MG/DL (ref 65–100)
HCT VFR BLD AUTO: 36.5 % (ref 35.8–46.3)
HGB BLD-MCNC: 12 G/DL (ref 11.7–15.4)
IMM GRANULOCYTES # BLD AUTO: 0 K/UL (ref 0–0.5)
IMM GRANULOCYTES NFR BLD AUTO: 0 % (ref 0–5)
LYMPHOCYTES # BLD: 1.2 K/UL (ref 0.5–4.6)
LYMPHOCYTES NFR BLD: 18 % (ref 13–44)
MCH RBC QN AUTO: 31.7 PG (ref 26.1–32.9)
MCHC RBC AUTO-ENTMCNC: 32.9 G/DL (ref 31.4–35)
MCV RBC AUTO: 96.3 FL (ref 82–102)
MONOCYTES # BLD: 0.6 K/UL (ref 0.1–1.3)
MONOCYTES NFR BLD: 9 % (ref 4–12)
NEUTS SEG # BLD: 4.7 K/UL (ref 1.7–8.2)
NEUTS SEG NFR BLD: 70 % (ref 43–78)
NRBC # BLD: 0 K/UL (ref 0–0.2)
PLATELET # BLD AUTO: 204 K/UL (ref 150–450)
PMV BLD AUTO: 10.5 FL (ref 9.4–12.3)
POTASSIUM SERPL-SCNC: 4.4 MMOL/L (ref 3.5–5.1)
PROT SERPL-MCNC: 6 G/DL (ref 6.3–8.2)
RBC # BLD AUTO: 3.79 M/UL (ref 4.05–5.2)
SODIUM SERPL-SCNC: 137 MMOL/L (ref 136–146)
TSH W FREE THYROID IF ABNORMAL: 1.42 UIU/ML (ref 0.36–3.74)
VIT B12 SERPL-MCNC: 682 PG/ML (ref 193–986)
WBC # BLD AUTO: 6.7 K/UL (ref 4.3–11.1)

## 2024-04-12 PROCEDURE — 99214 OFFICE O/P EST MOD 30 MIN: CPT | Performed by: NURSE PRACTITIONER

## 2024-04-12 PROCEDURE — 1123F ACP DISCUSS/DSCN MKR DOCD: CPT | Performed by: NURSE PRACTITIONER

## 2024-04-12 RX ORDER — EZETIMIBE 10 MG/1
10 TABLET ORAL DAILY
Qty: 90 TABLET | Refills: 1 | Status: SHIPPED | OUTPATIENT
Start: 2024-04-12

## 2024-04-12 RX ORDER — ASPIRIN 81 MG/1
81 TABLET ORAL DAILY
Qty: 90 TABLET | Refills: 1 | Status: SHIPPED | OUTPATIENT
Start: 2024-04-12

## 2024-04-12 RX ORDER — LISINOPRIL 10 MG/1
10 TABLET ORAL DAILY
Qty: 90 TABLET | Refills: 1 | Status: SHIPPED | OUTPATIENT
Start: 2024-04-12

## 2024-04-12 RX ORDER — MULTIVIT-MIN/IRON/FOLIC ACID/K 18-600-40
1 CAPSULE ORAL DAILY
Qty: 90 CAPSULE | Refills: 1 | Status: SHIPPED | OUTPATIENT
Start: 2024-04-12

## 2024-04-12 RX ORDER — ONDANSETRON 4 MG/1
4 TABLET, FILM COATED ORAL 3 TIMES DAILY PRN
Qty: 30 TABLET | Refills: 5 | Status: SHIPPED | OUTPATIENT
Start: 2024-04-12

## 2024-04-12 RX ORDER — CETIRIZINE HYDROCHLORIDE 5 MG/1
5 TABLET ORAL DAILY
Qty: 90 TABLET | Refills: 1 | Status: SHIPPED | OUTPATIENT
Start: 2024-04-12

## 2024-04-12 RX ORDER — FLUTICASONE PROPIONATE 50 MCG
2 SPRAY, SUSPENSION (ML) NASAL DAILY
Qty: 16 G | Refills: 5 | Status: SHIPPED | OUTPATIENT
Start: 2024-04-12

## 2024-04-12 RX ORDER — VALACYCLOVIR HYDROCHLORIDE 1 G/1
TABLET, FILM COATED ORAL
Qty: 4 TABLET | Refills: 5 | Status: SHIPPED | OUTPATIENT
Start: 2024-04-12

## 2024-04-12 RX ORDER — CHOLESTYRAMINE 4 G/9G
1 POWDER, FOR SUSPENSION ORAL DAILY PRN
Qty: 90 PACKET | Refills: 1 | Status: SHIPPED | OUTPATIENT
Start: 2024-04-12

## 2024-04-12 ASSESSMENT — PATIENT HEALTH QUESTIONNAIRE - PHQ9
SUM OF ALL RESPONSES TO PHQ QUESTIONS 1-9: 0
1. LITTLE INTEREST OR PLEASURE IN DOING THINGS: NOT AT ALL
2. FEELING DOWN, DEPRESSED OR HOPELESS: NOT AT ALL
SUM OF ALL RESPONSES TO PHQ QUESTIONS 1-9: 0
SUM OF ALL RESPONSES TO PHQ9 QUESTIONS 1 & 2: 0
SUM OF ALL RESPONSES TO PHQ9 QUESTIONS 1 & 2: 0
SUM OF ALL RESPONSES TO PHQ QUESTIONS 1-9: 0
2. FEELING DOWN, DEPRESSED OR HOPELESS: NOT AT ALL
1. LITTLE INTEREST OR PLEASURE IN DOING THINGS: NOT AT ALL
SUM OF ALL RESPONSES TO PHQ QUESTIONS 1-9: 0

## 2024-04-15 ENCOUNTER — NURSE ONLY (OUTPATIENT)
Dept: FAMILY MEDICINE CLINIC | Facility: CLINIC | Age: 89
End: 2024-04-15

## 2024-04-15 DIAGNOSIS — E78.2 MIXED HYPERLIPIDEMIA: ICD-10-CM

## 2024-04-15 LAB
CHOLEST SERPL-MCNC: 162 MG/DL
HDLC SERPL-MCNC: 80 MG/DL (ref 40–60)
HDLC SERPL: 2
LDLC SERPL CALC-MCNC: 69.6 MG/DL
TRIGL SERPL-MCNC: 62 MG/DL (ref 35–150)
VLDLC SERPL CALC-MCNC: 12.4 MG/DL (ref 6–23)

## 2024-04-22 ASSESSMENT — ENCOUNTER SYMPTOMS
TROUBLE SWALLOWING: 0
DIARRHEA: 1
SORE THROAT: 0
CHOKING: 0
FACIAL SWELLING: 0
WHEEZING: 0
RECTAL PAIN: 0
ANAL BLEEDING: 0
APNEA: 0
VOICE CHANGE: 0
EYES NEGATIVE: 1
SINUS PAIN: 0
VOMITING: 0
COLOR CHANGE: 0
COUGH: 0
ABDOMINAL DISTENTION: 0
STRIDOR: 0
CHEST TIGHTNESS: 0
SHORTNESS OF BREATH: 0
BLOOD IN STOOL: 0
SINUS PRESSURE: 0
NAUSEA: 1
RHINORRHEA: 0
EYE DISCHARGE: 0
BACK PAIN: 1
EYE PAIN: 0
CONSTIPATION: 0
ABDOMINAL PAIN: 0
RESPIRATORY NEGATIVE: 1

## 2024-06-03 ENCOUNTER — TELEMEDICINE (OUTPATIENT)
Dept: FAMILY MEDICINE CLINIC | Facility: CLINIC | Age: 89
End: 2024-06-03
Payer: MEDICARE

## 2024-06-03 DIAGNOSIS — K58.0 IRRITABLE BOWEL SYNDROME WITH DIARRHEA: ICD-10-CM

## 2024-06-03 DIAGNOSIS — R41.3 MEMORY LOSS: Primary | ICD-10-CM

## 2024-06-03 DIAGNOSIS — I25.10 CORONARY ARTERY DISEASE INVOLVING NATIVE CORONARY ARTERY OF NATIVE HEART WITHOUT ANGINA PECTORIS: ICD-10-CM

## 2024-06-03 DIAGNOSIS — H91.93 BILATERAL HEARING LOSS, UNSPECIFIED HEARING LOSS TYPE: ICD-10-CM

## 2024-06-03 DIAGNOSIS — M19.90 OSTEOARTHRITIS, UNSPECIFIED OSTEOARTHRITIS TYPE, UNSPECIFIED SITE: ICD-10-CM

## 2024-06-03 DIAGNOSIS — N39.0 FREQUENT UTI: ICD-10-CM

## 2024-06-03 DIAGNOSIS — J30.9 ALLERGIC RHINITIS, UNSPECIFIED SEASONALITY, UNSPECIFIED TRIGGER: ICD-10-CM

## 2024-06-03 DIAGNOSIS — I10 PRIMARY HYPERTENSION: ICD-10-CM

## 2024-06-03 DIAGNOSIS — K21.9 GASTROESOPHAGEAL REFLUX DISEASE WITHOUT ESOPHAGITIS: ICD-10-CM

## 2024-06-03 DIAGNOSIS — E55.9 VITAMIN D DEFICIENCY: ICD-10-CM

## 2024-06-03 DIAGNOSIS — Z23 ENCOUNTER FOR IMMUNIZATION: ICD-10-CM

## 2024-06-03 DIAGNOSIS — B00.1 COLD SORE: ICD-10-CM

## 2024-06-03 DIAGNOSIS — E78.2 MIXED HYPERLIPIDEMIA: ICD-10-CM

## 2024-06-03 PROCEDURE — 1123F ACP DISCUSS/DSCN MKR DOCD: CPT | Performed by: NURSE PRACTITIONER

## 2024-06-03 PROCEDURE — 99213 OFFICE O/P EST LOW 20 MIN: CPT | Performed by: NURSE PRACTITIONER

## 2024-06-03 SDOH — ECONOMIC STABILITY: FOOD INSECURITY: WITHIN THE PAST 12 MONTHS, THE FOOD YOU BOUGHT JUST DIDN'T LAST AND YOU DIDN'T HAVE MONEY TO GET MORE.: PATIENT DECLINED

## 2024-06-03 SDOH — ECONOMIC STABILITY: TRANSPORTATION INSECURITY
IN THE PAST 12 MONTHS, HAS LACK OF TRANSPORTATION KEPT YOU FROM MEETINGS, WORK, OR FROM GETTING THINGS NEEDED FOR DAILY LIVING?: PATIENT DECLINED

## 2024-06-03 SDOH — SOCIAL STABILITY: SOCIAL NETWORK: IN A TYPICAL WEEK, HOW MANY TIMES DO YOU TALK ON THE PHONE WITH FAMILY, FRIENDS, OR NEIGHBORS?: PATIENT DECLINED

## 2024-06-03 SDOH — ECONOMIC STABILITY: INCOME INSECURITY: IN THE LAST 12 MONTHS, WAS THERE A TIME WHEN YOU WERE NOT ABLE TO PAY THE MORTGAGE OR RENT ON TIME?: PATIENT DECLINED

## 2024-06-03 SDOH — SOCIAL STABILITY: SOCIAL INSECURITY: WITHIN THE LAST YEAR, HAVE YOU BEEN AFRAID OF YOUR PARTNER OR EX-PARTNER?: PATIENT DECLINED

## 2024-06-03 SDOH — SOCIAL STABILITY: SOCIAL NETWORK: HOW OFTEN DO YOU ATTEND CHURCH OR RELIGIOUS SERVICES?: PATIENT DECLINED

## 2024-06-03 SDOH — HEALTH STABILITY: MENTAL HEALTH
STRESS IS WHEN SOMEONE FEELS TENSE, NERVOUS, ANXIOUS, OR CAN'T SLEEP AT NIGHT BECAUSE THEIR MIND IS TROUBLED. HOW STRESSED ARE YOU?: PATIENT DECLINED

## 2024-06-03 SDOH — SOCIAL STABILITY: SOCIAL NETWORK: ARE YOU MARRIED, WIDOWED, DIVORCED, SEPARATED, NEVER MARRIED, OR LIVING WITH A PARTNER?: PATIENT DECLINED

## 2024-06-03 SDOH — ECONOMIC STABILITY: HOUSING INSECURITY
IN THE LAST 12 MONTHS, WAS THERE A TIME WHEN YOU DID NOT HAVE A STEADY PLACE TO SLEEP OR SLEPT IN A SHELTER (INCLUDING NOW)?: PATIENT DECLINED

## 2024-06-03 SDOH — ECONOMIC STABILITY: FOOD INSECURITY: WITHIN THE PAST 12 MONTHS, YOU WORRIED THAT YOUR FOOD WOULD RUN OUT BEFORE YOU GOT MONEY TO BUY MORE.: PATIENT DECLINED

## 2024-06-03 SDOH — SOCIAL STABILITY: SOCIAL NETWORK
DO YOU BELONG TO ANY CLUBS OR ORGANIZATIONS SUCH AS CHURCH GROUPS UNIONS, FRATERNAL OR ATHLETIC GROUPS, OR SCHOOL GROUPS?: PATIENT DECLINED

## 2024-06-03 SDOH — SOCIAL STABILITY: SOCIAL INSECURITY
WITHIN THE LAST YEAR, HAVE YOU BEEN HUMILIATED OR EMOTIONALLY ABUSED IN OTHER WAYS BY YOUR PARTNER OR EX-PARTNER?: PATIENT DECLINED

## 2024-06-03 SDOH — ECONOMIC STABILITY: INCOME INSECURITY: HOW HARD IS IT FOR YOU TO PAY FOR THE VERY BASICS LIKE FOOD, HOUSING, MEDICAL CARE, AND HEATING?: PATIENT DECLINED

## 2024-06-03 SDOH — HEALTH STABILITY: MENTAL HEALTH: HOW MANY STANDARD DRINKS CONTAINING ALCOHOL DO YOU HAVE ON A TYPICAL DAY?: PATIENT DECLINED

## 2024-06-03 SDOH — SOCIAL STABILITY: SOCIAL INSECURITY
WITHIN THE LAST YEAR, HAVE TO BEEN RAPED OR FORCED TO HAVE ANY KIND OF SEXUAL ACTIVITY BY YOUR PARTNER OR EX-PARTNER?: PATIENT DECLINED

## 2024-06-03 SDOH — SOCIAL STABILITY: SOCIAL INSECURITY
WITHIN THE LAST YEAR, HAVE YOU BEEN KICKED, HIT, SLAPPED, OR OTHERWISE PHYSICALLY HURT BY YOUR PARTNER OR EX-PARTNER?: PATIENT DECLINED

## 2024-06-03 SDOH — SOCIAL STABILITY: SOCIAL NETWORK: HOW OFTEN DO YOU ATTENT MEETINGS OF THE CLUB OR ORGANIZATION YOU BELONG TO?: PATIENT DECLINED

## 2024-06-03 SDOH — SOCIAL STABILITY: SOCIAL NETWORK: HOW OFTEN DO YOU GET TOGETHER WITH FRIENDS OR RELATIVES?: PATIENT DECLINED

## 2024-06-03 SDOH — HEALTH STABILITY: MENTAL HEALTH: HOW OFTEN DO YOU HAVE A DRINK CONTAINING ALCOHOL?: PATIENT DECLINED

## 2024-06-03 SDOH — HEALTH STABILITY: PHYSICAL HEALTH
ON AVERAGE, HOW MANY DAYS PER WEEK DO YOU ENGAGE IN MODERATE TO STRENUOUS EXERCISE (LIKE A BRISK WALK)?: PATIENT DECLINED

## 2024-06-03 SDOH — ECONOMIC STABILITY: TRANSPORTATION INSECURITY
IN THE PAST 12 MONTHS, HAS THE LACK OF TRANSPORTATION KEPT YOU FROM MEDICAL APPOINTMENTS OR FROM GETTING MEDICATIONS?: PATIENT DECLINED

## 2024-06-03 NOTE — PROGRESS NOTES
short term memory loss and seems confused at times. Pt has had frequent UTI's over past year or so and is under care of [facility]. [facility] has pt on daily Macrobid to prevent recurrence of UTI and had pt on Oxybutynin for OAB. Pt saw [facility] 4/5/24 and again on 4/9/24. No evidence of UTI either time. [facility] continued pt on daily Macrobid but stopped pt's Oxybutynin as it can contribute to confusion and started pt on Vesicare 5 mg po daily instead. Pt denied any urinary symptoms outside of urinary frequency. Pt did not seem confused during last appointment and was A&O x 3. Pt lives independently with her  and pt's daughter checks on her frequently. Had pt continue medications/POC per [facility]. Checked TFT's and Vitamin B12 level and these were WNL. Ordered MRI brain w/o contrast and pt had done 4/16/24-showed no evidence of stroke or brain masses seen. Did show that the previously seen ventriculomegaly that was seen on her head CT in December 2022 is stable. No hydrocephalus seen. Did show some mild scattered white matter changes that are common with age and can be contributing to memory loss. Pt was referred to Neurology for further evaluation and treatment and has an appointment with them 11/21/24. Pt's daughter reports that pt seems less confused after changing to Vesicare and denies any new/worsening symptoms. Pt A&O x 3 again today.   Musculoskeletal:  Positive for arthralgias, back pain, gait problem and neck pain. Negative for joint swelling, myalgias and neck stiffness.        Pt has OA pain to neck, back, hands-relieved by occasional PRN OTC Aleve, but this was discouraged previously and pt recommended to take PRN OTC Tylenol Arthritis as directed due to age/recent mild renal insufficiency/hx of GERD. Pt reports using rolling walker as directed and is extra careful to prevent falls. Pt has cut back on her consumption of wine.   Skin: Negative.  Negative for color change, pallor, rash and wound.   Allergic/Immunologic:

## 2024-06-05 ASSESSMENT — ENCOUNTER SYMPTOMS
EYE PAIN: 0
APNEA: 0
STRIDOR: 0
VOMITING: 0
CONSTIPATION: 0
WHEEZING: 0
BACK PAIN: 1
SINUS PRESSURE: 0
NAUSEA: 1
SHORTNESS OF BREATH: 0
COUGH: 0
DIARRHEA: 1
COLOR CHANGE: 0
FACIAL SWELLING: 0
RESPIRATORY NEGATIVE: 1
BLOOD IN STOOL: 0
SINUS PAIN: 0
EYE DISCHARGE: 0
VOICE CHANGE: 0
RECTAL PAIN: 0
ABDOMINAL DISTENTION: 0
ANAL BLEEDING: 0
TROUBLE SWALLOWING: 0
SORE THROAT: 0
ABDOMINAL PAIN: 0
CHOKING: 0
RHINORRHEA: 0
EYES NEGATIVE: 1
CHEST TIGHTNESS: 0

## 2024-06-21 ENCOUNTER — OFFICE VISIT (OUTPATIENT)
Dept: FAMILY MEDICINE CLINIC | Facility: CLINIC | Age: 89
End: 2024-06-21
Payer: MEDICARE

## 2024-06-21 VITALS
WEIGHT: 125.2 LBS | TEMPERATURE: 97.4 F | BODY MASS INDEX: 23.04 KG/M2 | RESPIRATION RATE: 14 BRPM | SYSTOLIC BLOOD PRESSURE: 120 MMHG | DIASTOLIC BLOOD PRESSURE: 60 MMHG | HEART RATE: 71 BPM | HEIGHT: 62 IN | OXYGEN SATURATION: 95 %

## 2024-06-21 DIAGNOSIS — K21.9 GASTROESOPHAGEAL REFLUX DISEASE WITHOUT ESOPHAGITIS: Primary | ICD-10-CM

## 2024-06-21 DIAGNOSIS — R11.0 NAUSEA: ICD-10-CM

## 2024-06-21 PROCEDURE — 99213 OFFICE O/P EST LOW 20 MIN: CPT | Performed by: NURSE PRACTITIONER

## 2024-06-21 PROCEDURE — 1123F ACP DISCUSS/DSCN MKR DOCD: CPT | Performed by: NURSE PRACTITIONER

## 2024-06-21 RX ORDER — OMEPRAZOLE 20 MG/1
20 CAPSULE, DELAYED RELEASE ORAL
Qty: 14 CAPSULE | Refills: 0 | Status: SHIPPED | OUTPATIENT
Start: 2024-06-21 | End: 2024-07-05

## 2024-06-21 RX ORDER — FAMOTIDINE 20 MG/1
20 TABLET, FILM COATED ORAL NIGHTLY
Qty: 14 TABLET | Refills: 0
Start: 2024-06-21 | End: 2024-07-05

## 2024-06-21 ASSESSMENT — PATIENT HEALTH QUESTIONNAIRE - PHQ9
SUM OF ALL RESPONSES TO PHQ QUESTIONS 1-9: 0
2. FEELING DOWN, DEPRESSED OR HOPELESS: NOT AT ALL
SUM OF ALL RESPONSES TO PHQ9 QUESTIONS 1 & 2: 0
SUM OF ALL RESPONSES TO PHQ QUESTIONS 1-9: 0
SUM OF ALL RESPONSES TO PHQ QUESTIONS 1-9: 0
1. LITTLE INTEREST OR PLEASURE IN DOING THINGS: NOT AT ALL
SUM OF ALL RESPONSES TO PHQ QUESTIONS 1-9: 0

## 2024-06-21 NOTE — PROGRESS NOTES
(REZA MONROY MD)       Dx: Memory loss    2 Result Notes  Details     Reading Physician Reading Date Result Priority   Jc Rodriguez MD  [phone] 4/16/2024        Narrative & Impression  MRI of the brain     INDICATION: Short-term memory loss     Standard MRI sequences were obtained through the brain in multiple planes  without IV contrast     COMPARISON: CT head December 8, 2022     FINDINGS:  Mild scattered periventricular and subcortical white matter T2-FLAIR signal  hyperintensity are nonspecific but suggest longstanding cerebral small-vessel  disease.  There are no areas of significant signal abnormality in the brain.  There is no  evidence of acute hemorrhage or infarction. Stable ventriculomegaly. There are  no extra-axial fluid collections.  There are no intracranial masses.  There are  no bony lesions.  The sinuses are clear.        IMPRESSION:  No evidence of acute intracranial abnormality     Stable ventriculomegaly.                Specimen Collected: 04/16/24 16:38 EDT Last Resulted: 04/16/24 16:40 EDT       Order Details        View Encounter        Lab and Collection Details        Routing        Result History     View All Conversations on this Encounter            Result Care Coordination       Result Notes     Alice Walker MA  4/17/2024  8:40 AM EDT Back to Top      Spoke with patients daughter regarding results.    KELBY Moore - NP  4/17/2024  8:07 AM EDT        MRI brain showed no evidence of stroke or brain masses seen. Did show that the previously seen ventriculomegaly that was seen on her head CT in December 2022 is stable. No hydrocephalus seen. Did show some mild scattered white matter changes that are common with age and can be contributing to memory loss. Continue with plan to see Neurology. I saw that Kamlesh was in the ER. Please schedule him when you call about Sobeida's MRI-tomorrow 4:00 PM slot, but arrive at 3 PM            PLEASE NOTE:  This

## 2024-06-22 ASSESSMENT — ENCOUNTER SYMPTOMS
COLOR CHANGE: 0
ABDOMINAL DISTENTION: 0
ANAL BLEEDING: 0
APNEA: 0
SORE THROAT: 0
NAUSEA: 1
BLOOD IN STOOL: 0
EYE DISCHARGE: 0
SINUS PRESSURE: 0
FACIAL SWELLING: 0
VOMITING: 0
DIARRHEA: 1
RHINORRHEA: 0
BACK PAIN: 1
SINUS PAIN: 0
STRIDOR: 0
EYES NEGATIVE: 1
CONSTIPATION: 0
CHOKING: 0
ABDOMINAL PAIN: 0
RECTAL PAIN: 0
COUGH: 0
EYE PAIN: 0
SHORTNESS OF BREATH: 0
RESPIRATORY NEGATIVE: 1
WHEEZING: 0
CHEST TIGHTNESS: 0
TROUBLE SWALLOWING: 0
VOICE CHANGE: 0

## 2024-07-01 DIAGNOSIS — K21.9 GASTROESOPHAGEAL REFLUX DISEASE WITHOUT ESOPHAGITIS: ICD-10-CM

## 2024-07-08 ENCOUNTER — TELEMEDICINE (OUTPATIENT)
Dept: FAMILY MEDICINE CLINIC | Facility: CLINIC | Age: 89
End: 2024-07-08
Payer: MEDICARE

## 2024-07-08 DIAGNOSIS — R11.2 NAUSEA AND VOMITING, UNSPECIFIED VOMITING TYPE: ICD-10-CM

## 2024-07-08 DIAGNOSIS — J01.00 ACUTE MAXILLARY SINUSITIS, RECURRENCE NOT SPECIFIED: ICD-10-CM

## 2024-07-08 DIAGNOSIS — R09.81 NASAL CONGESTION: ICD-10-CM

## 2024-07-08 DIAGNOSIS — J34.89 SINUS PRESSURE: ICD-10-CM

## 2024-07-08 DIAGNOSIS — K21.9 GASTROESOPHAGEAL REFLUX DISEASE WITHOUT ESOPHAGITIS: Primary | ICD-10-CM

## 2024-07-08 DIAGNOSIS — R05.8 DRY COUGH: ICD-10-CM

## 2024-07-08 DIAGNOSIS — R09.82 POST-NASAL DRIP: ICD-10-CM

## 2024-07-08 PROCEDURE — 1123F ACP DISCUSS/DSCN MKR DOCD: CPT | Performed by: NURSE PRACTITIONER

## 2024-07-08 PROCEDURE — 99213 OFFICE O/P EST LOW 20 MIN: CPT | Performed by: NURSE PRACTITIONER

## 2024-07-08 RX ORDER — BENZONATATE 100 MG/1
100 CAPSULE ORAL 3 TIMES DAILY PRN
Qty: 21 CAPSULE | Refills: 0 | Status: SHIPPED | OUTPATIENT
Start: 2024-07-08 | End: 2024-07-15

## 2024-07-08 RX ORDER — CEPHALEXIN 500 MG/1
500 CAPSULE ORAL 2 TIMES DAILY
Qty: 14 CAPSULE | Refills: 0 | Status: SHIPPED | OUTPATIENT
Start: 2024-07-08 | End: 2024-07-15

## 2024-07-08 RX ORDER — OMEPRAZOLE 20 MG/1
20 CAPSULE, DELAYED RELEASE ORAL
Qty: 90 CAPSULE | Refills: 1 | Status: SHIPPED | OUTPATIENT
Start: 2024-07-08

## 2024-07-08 RX ORDER — ONDANSETRON 4 MG/1
4 TABLET, FILM COATED ORAL 3 TIMES DAILY PRN
Qty: 30 TABLET | Refills: 5 | Status: SHIPPED | OUTPATIENT
Start: 2024-07-08

## 2024-07-08 RX ORDER — FAMOTIDINE 20 MG/1
20 TABLET, FILM COATED ORAL NIGHTLY
Qty: 90 TABLET | Refills: 1 | Status: SHIPPED | OUTPATIENT
Start: 2024-07-08

## 2024-07-08 NOTE — PROGRESS NOTES
[place] [address]  Phone: [phone] Fax [phone]  Marshall Linder MS, APRN, FNP-C  7/8/2024    Sobeida Ugarte is a 89 y.o. female who was seen by synchronous (real-time) audio-video technology on 7/8/2024 for Follow-up (Recheck GERD/discuss sinus symptoms-see below)    Pt has a hx of memory loss, frequent UTI, HTN, mixed HLD, CAD, OA, cold sores, Vitamin D deficiency, bilat hearing loss, allergic rhinitis, IBS, and GERD. Pt accompanied on VV today by her daughter who helped pt answer some questions due to pt's memory loss. Pt under care of [facility] for GERD and IBS-D. Saw them 11/11/22 and was continued on PRN OTC Pepcid, PRN Zofran, and Cholestyramine, GERD/IBS diet. At last appointment 6/21/24 pt reported having increased heartburn/reflux with some nausea for several days prior to last appointment but admitted that she had been eating a lot of salsa. Pt denied any vomiting, diarrhea, constipation, abd pain, GI bleeding. Pt reported taking OTC Pepcid 20 mg po daily PRN but symptoms continued. Pt had not taken her PRN Zofran. GERD diet was reinforced-pt was encouraged to avoid foods like salsa that are acidic and spicy. Pt was given Omeprazole 20 mg po daily in AM for 14 days and had pt take her OTC Pepcid 20 mg po qhs for 14 days scheduled. Had pt use her PRN Zofran as directed. Pt reports taking 14 days of Omeprazole and OTC Pepcid as directed but again has not been following a GERD diet-still eating tomatoes, green chiles, salsa, and drinking wine. Pt still having some nausea and heartburn/reflux after eating dinner at night. Occasional vomiting. Still not taking her PRN Zofran. Pt also reports having some nasal congestion with yellow d/c, bilat maxillary sinus pressure/pain, scratchy sore throat-PND, and dry cough from PND off and on for past week. Pt denies any fever, chills, malaise, BA's, SOB, wheezing, CP. Pt reports taking her

## 2024-07-10 DIAGNOSIS — H61.23 BILATERAL IMPACTED CERUMEN: Primary | ICD-10-CM

## 2024-07-22 ENCOUNTER — TELEMEDICINE (OUTPATIENT)
Dept: FAMILY MEDICINE CLINIC | Facility: CLINIC | Age: 89
End: 2024-07-22
Payer: MEDICARE

## 2024-07-22 DIAGNOSIS — R05.3 PERSISTENT DRY COUGH: Primary | ICD-10-CM

## 2024-07-22 DIAGNOSIS — R09.82 POST-NASAL DRIP: ICD-10-CM

## 2024-07-22 DIAGNOSIS — K21.9 GASTROESOPHAGEAL REFLUX DISEASE WITHOUT ESOPHAGITIS: ICD-10-CM

## 2024-07-22 DIAGNOSIS — J30.9 ALLERGIC RHINITIS, UNSPECIFIED SEASONALITY, UNSPECIFIED TRIGGER: ICD-10-CM

## 2024-07-22 PROBLEM — R11.2 NAUSEA AND VOMITING: Status: ACTIVE | Noted: 2024-07-22

## 2024-07-22 PROBLEM — R11.2 NAUSEA AND VOMITING: Status: RESOLVED | Noted: 2024-07-22 | Resolved: 2024-07-22

## 2024-07-22 PROCEDURE — 99213 OFFICE O/P EST LOW 20 MIN: CPT | Performed by: NURSE PRACTITIONER

## 2024-07-22 PROCEDURE — 1123F ACP DISCUSS/DSCN MKR DOCD: CPT | Performed by: NURSE PRACTITIONER

## 2024-07-22 RX ORDER — BENZONATATE 100 MG/1
100 CAPSULE ORAL 3 TIMES DAILY PRN
COMMUNITY
End: 2024-07-22 | Stop reason: SDUPTHER

## 2024-07-22 RX ORDER — MONTELUKAST SODIUM 10 MG/1
10 TABLET ORAL DAILY
Qty: 90 TABLET | Refills: 1 | Status: SHIPPED | OUTPATIENT
Start: 2024-07-22

## 2024-07-22 RX ORDER — BENZONATATE 100 MG/1
100 CAPSULE ORAL 3 TIMES DAILY PRN
Qty: 30 CAPSULE | Refills: 5 | Status: SHIPPED | OUTPATIENT
Start: 2024-07-22

## 2024-07-22 RX ORDER — BENZONATATE 100 MG/1
100 CAPSULE ORAL 3 TIMES DAILY PRN
Qty: 30 CAPSULE | Refills: 0 | Status: SHIPPED | OUTPATIENT
Start: 2024-07-22 | End: 2024-07-22 | Stop reason: SDUPTHER

## 2024-07-22 NOTE — PROGRESS NOTES
Protein      6.3 - 8.2 g/dL 6.0 (L)      Albumin      3.2 - 4.6 g/dL 3.5      Globulin      2.8 - 4.5 g/dL 2.5 (L)      Albumin/Globulin Ratio      0.4 - 1.6   1.4      Cholesterol, Total      <200 MG/DL   162    Triglycerides      35 - 150 MG/DL   62    HDL Cholesterol      40 - 60 MG/DL   80 (H)    LDL Cholesterol      <100 MG/DL   69.6    VLDL      6.0 - 23.0 MG/DL   12.4    Chol/HDL Ratio          2.0    Vitamin B-12      193 - 986 pg/mL 682      Vit D, 25-Hydroxy      30.0 - 100.0 ng/mL 60.5      TSH w Free Thyroid if Abnormal      0.358 - 3.740 UIU/ML 1.42            Sobeida Ugarte (: 1931) is a 89 y.o. female, Established patient patient, seen via A/V VV for evaluation of the following chief complaint(s):  Follow-up (Recheck GERD/acute sinusitis-see below)  Pt has a hx of memory loss, frequent UTI, HTN, mixed HLD, CAD, OA, cold sores, Vitamin D deficiency, bilat hearing loss, allergic rhinitis, IBS, and GERD. Pt accompanied on VV today by her daughter who helped pt answer some questions due to pt's memory loss. Pt under care of [facility] for GERD and IBS-D. Saw them 22 and was continued on PRN OTC Pepcid, PRN Zofran, and Cholestyramine, GERD/IBS diet. At in office appointment 24 pt reported having increased heartburn/reflux with some nausea for several days prior to last appointment but admitted that she had been eating a lot of salsa. Pt denied any vomiting, diarrhea, constipation, abd pain, GI bleeding. Pt reported taking OTC Pepcid 20 mg po daily PRN but symptoms continued. Pt had not taken her PRN Zofran. GERD diet was reinforced-pt was encouraged to avoid foods like salsa that are acidic and spicy. Pt was given Omeprazole 20 mg po daily in AM for 14 days and had pt take her OTC Pepcid 20 mg po qhs for 14 days scheduled. Had pt use her PRN Zofran as directed. At VV appointment 24 pt reported taking 14 days of Omeprazole and OTC Pepcid as directed but again had not been

## 2024-07-23 DIAGNOSIS — E55.9 VITAMIN D DEFICIENCY: ICD-10-CM

## 2024-07-23 RX ORDER — MULTIVIT-MIN/IRON/FOLIC ACID/K 18-600-40
1 CAPSULE ORAL DAILY
Qty: 90 CAPSULE | Refills: 1 | Status: SHIPPED | OUTPATIENT
Start: 2024-07-23

## 2024-07-29 NOTE — PROGRESS NOTES
modification    BP at target, continue meds        Return in about 6 months (around 1/30/2025).          Thank you for allowing me to participate in this patient's care.  Please call or contact me if there are any questions or concerns regarding the above.      REZA MONROY MD  07/30/24  10:57 AM

## 2024-07-30 ENCOUNTER — OFFICE VISIT (OUTPATIENT)
Age: 89
End: 2024-07-30
Payer: MEDICARE

## 2024-07-30 VITALS
DIASTOLIC BLOOD PRESSURE: 66 MMHG | HEIGHT: 62 IN | SYSTOLIC BLOOD PRESSURE: 110 MMHG | BODY MASS INDEX: 21.97 KG/M2 | WEIGHT: 119.4 LBS | HEART RATE: 72 BPM

## 2024-07-30 DIAGNOSIS — I10 PRIMARY HYPERTENSION: ICD-10-CM

## 2024-07-30 DIAGNOSIS — I25.10 CORONARY ARTERY DISEASE INVOLVING NATIVE CORONARY ARTERY OF NATIVE HEART WITHOUT ANGINA PECTORIS: Primary | ICD-10-CM

## 2024-07-30 PROCEDURE — 1123F ACP DISCUSS/DSCN MKR DOCD: CPT | Performed by: INTERNAL MEDICINE

## 2024-07-30 PROCEDURE — 99214 OFFICE O/P EST MOD 30 MIN: CPT | Performed by: INTERNAL MEDICINE

## 2024-07-30 ASSESSMENT — ENCOUNTER SYMPTOMS: COUGH: 1

## 2024-08-20 ENCOUNTER — OFFICE VISIT (OUTPATIENT)
Dept: FAMILY MEDICINE CLINIC | Facility: CLINIC | Age: 89
End: 2024-08-20
Payer: MEDICARE

## 2024-08-20 VITALS
WEIGHT: 120 LBS | TEMPERATURE: 97.5 F | RESPIRATION RATE: 14 BRPM | OXYGEN SATURATION: 96 % | SYSTOLIC BLOOD PRESSURE: 122 MMHG | BODY MASS INDEX: 22.08 KG/M2 | HEIGHT: 62 IN | DIASTOLIC BLOOD PRESSURE: 60 MMHG | HEART RATE: 74 BPM

## 2024-08-20 DIAGNOSIS — R09.82 POST-NASAL DRIP: ICD-10-CM

## 2024-08-20 DIAGNOSIS — J30.9 ALLERGIC RHINITIS, UNSPECIFIED SEASONALITY, UNSPECIFIED TRIGGER: ICD-10-CM

## 2024-08-20 DIAGNOSIS — R05.3 PERSISTENT COUGH FOR 3 WEEKS OR LONGER: ICD-10-CM

## 2024-08-20 DIAGNOSIS — K21.9 GASTROESOPHAGEAL REFLUX DISEASE WITHOUT ESOPHAGITIS: ICD-10-CM

## 2024-08-20 DIAGNOSIS — J40 BRONCHITIS: Primary | ICD-10-CM

## 2024-08-20 LAB
BASOPHILS # BLD: 0.1 K/UL (ref 0–0.2)
BASOPHILS NFR BLD: 1 % (ref 0–2)
DIFFERENTIAL METHOD BLD: ABNORMAL
EOSINOPHIL # BLD: 0.2 K/UL (ref 0–0.8)
EOSINOPHIL NFR BLD: 3 % (ref 0.5–7.8)
ERYTHROCYTE [DISTWIDTH] IN BLOOD BY AUTOMATED COUNT: 13.1 % (ref 11.9–14.6)
EXP DATE SOLUTION: NORMAL
EXP DATE SWAB: NORMAL
EXPIRATION DATE: NORMAL
HCT VFR BLD AUTO: 38 % (ref 35.8–46.3)
HGB BLD-MCNC: 12.3 G/DL (ref 11.7–15.4)
IMM GRANULOCYTES # BLD AUTO: 0 K/UL (ref 0–0.5)
IMM GRANULOCYTES NFR BLD AUTO: 0 % (ref 0–5)
LOT NUMBER POC: NORMAL
LOT NUMBER SOLUTION: NORMAL
LOT NUMBER SWAB: NORMAL
LYMPHOCYTES # BLD: 1.1 K/UL (ref 0.5–4.6)
LYMPHOCYTES NFR BLD: 17 % (ref 13–44)
MCH RBC QN AUTO: 31.1 PG (ref 26.1–32.9)
MCHC RBC AUTO-ENTMCNC: 32.4 G/DL (ref 31.4–35)
MCV RBC AUTO: 96 FL (ref 82–102)
MONOCYTES # BLD: 1 K/UL (ref 0.1–1.3)
MONOCYTES NFR BLD: 15 % (ref 4–12)
NEUTS SEG # BLD: 4.1 K/UL (ref 1.7–8.2)
NEUTS SEG NFR BLD: 64 % (ref 43–78)
NRBC # BLD: 0 K/UL (ref 0–0.2)
PLATELET # BLD AUTO: 194 K/UL (ref 150–450)
PMV BLD AUTO: 9.7 FL (ref 9.4–12.3)
RBC # BLD AUTO: 3.96 M/UL (ref 4.05–5.2)
SARS-COV-2 RNA, POC: NEGATIVE
WBC # BLD AUTO: 6.4 K/UL (ref 4.3–11.1)

## 2024-08-20 PROCEDURE — 99213 OFFICE O/P EST LOW 20 MIN: CPT | Performed by: NURSE PRACTITIONER

## 2024-08-20 PROCEDURE — 36415 COLL VENOUS BLD VENIPUNCTURE: CPT | Performed by: NURSE PRACTITIONER

## 2024-08-20 PROCEDURE — 1123F ACP DISCUSS/DSCN MKR DOCD: CPT | Performed by: NURSE PRACTITIONER

## 2024-08-20 PROCEDURE — 87635 SARS-COV-2 COVID-19 AMP PRB: CPT | Performed by: NURSE PRACTITIONER

## 2024-08-20 RX ORDER — OMEPRAZOLE 20 MG/1
20 CAPSULE, DELAYED RELEASE ORAL
Qty: 90 CAPSULE | Refills: 1 | Status: SHIPPED | OUTPATIENT
Start: 2024-08-20

## 2024-08-20 RX ORDER — AZITHROMYCIN 250 MG/1
250 TABLET, FILM COATED ORAL DAILY
Qty: 6 TABLET | Refills: 0 | Status: SHIPPED | OUTPATIENT
Start: 2024-08-20

## 2024-08-20 RX ORDER — BENZONATATE 100 MG/1
100 CAPSULE ORAL 3 TIMES DAILY PRN
Qty: 30 CAPSULE | Refills: 5 | Status: CANCELLED | OUTPATIENT
Start: 2024-08-20

## 2024-08-20 RX ORDER — BENZONATATE 100 MG/1
100 CAPSULE ORAL 3 TIMES DAILY PRN
Qty: 30 CAPSULE | Refills: 5 | Status: SHIPPED | OUTPATIENT
Start: 2024-08-20

## 2024-08-20 RX ORDER — FLUTICASONE PROPIONATE 50 MCG
2 SPRAY, SUSPENSION (ML) NASAL DAILY
Qty: 16 G | Refills: 5 | Status: SHIPPED | OUTPATIENT
Start: 2024-08-20

## 2024-08-20 RX ORDER — MONTELUKAST SODIUM 10 MG/1
10 TABLET ORAL DAILY
Qty: 90 TABLET | Refills: 1 | Status: SHIPPED | OUTPATIENT
Start: 2024-08-20

## 2024-08-20 RX ORDER — FAMOTIDINE 20 MG/1
20 TABLET, FILM COATED ORAL NIGHTLY
Qty: 90 TABLET | Refills: 1 | Status: SHIPPED | OUTPATIENT
Start: 2024-08-20

## 2024-08-20 RX ORDER — CETIRIZINE HYDROCHLORIDE 5 MG/1
5 TABLET ORAL DAILY
Qty: 90 TABLET | Refills: 1 | Status: SHIPPED | OUTPATIENT
Start: 2024-08-20

## 2024-08-20 ASSESSMENT — PATIENT HEALTH QUESTIONNAIRE - PHQ9
SUM OF ALL RESPONSES TO PHQ9 QUESTIONS 1 & 2: 0
SUM OF ALL RESPONSES TO PHQ QUESTIONS 1-9: 0
1. LITTLE INTEREST OR PLEASURE IN DOING THINGS: NOT AT ALL
SUM OF ALL RESPONSES TO PHQ QUESTIONS 1-9: 0
2. FEELING DOWN, DEPRESSED OR HOPELESS: NOT AT ALL

## 2024-08-20 NOTE — PROGRESS NOTES
[place] [address]  Phone: [phone] Fax [phone]  Marshall Linder MS, APRN, FNP-C  8/20/2024  Chief Complaint   Patient presents with    Follow-up     Recheck cough/GERD-see below      Pt under care of [facility] for GERD and IBS-D. Saw them 11/11/22 and was continued on PRN OTC Pepcid, PRN Zofran, and Cholestyramine, GERD/IBS diet. At in office appointment 6/21/24 pt reported having increased heartburn/reflux with some nausea for several days prior to last appointment but admitted that she had been eating a lot of salsa. Pt denied any vomiting, diarrhea, constipation, abd pain, GI bleeding. Pt reported taking OTC Pepcid 20 mg po daily PRN but symptoms continued. Pt had not taken her PRN Zofran. GERD diet was reinforced-pt was encouraged to avoid foods like salsa that are acidic and spicy. Pt was given Omeprazole 20 mg po daily in AM for 14 days and had pt take her OTC Pepcid 20 mg po qhs for 14 days scheduled. Had pt use her PRN Zofran as directed. At VV appointment 7/8/24 pt reported taking 14 days of Omeprazole and OTC Pepcid as directed but again had not been following a GERD diet-was still eating tomatoes, green chiles, salsa, and drinking wine. Pt was still having some nausea and heartburn/reflux after eating dinner at night. Occasional vomiting. Still was not taking her PRN Zofran. Pt also reported having some nasal congestion with yellow d/c, bilat maxillary sinus pressure/pain, scratchy sore throat-PND, and dry cough from PND off and on for week prior. Pt denied any fever, chills, malaise, BA's, SOB, wheezing, CP. Pt reports taking her Flonase and Zyrtec as directed but symptoms continuing. Had pt continue Pepcid 20 mg po qhs and Omeprazole 20 mg po in AM ongoing. Again I stressed importance of following strict GERD diet. Again encouraged pt take PRN Zofran to help with nausea to prevent vomiting. Pt agreed to try harder. Symptoms were

## 2024-08-21 PROBLEM — J90 BILATERAL PLEURAL EFFUSION: Status: ACTIVE | Noted: 2024-08-21

## 2024-08-21 PROBLEM — J47.1 BRONCHIECTASIS WITH ACUTE EXACERBATION (HCC): Status: ACTIVE | Noted: 2024-08-21

## 2024-08-21 ASSESSMENT — ENCOUNTER SYMPTOMS
EYE DISCHARGE: 0
SHORTNESS OF BREATH: 0
NAUSEA: 1
CHEST TIGHTNESS: 0
RECTAL PAIN: 0
ANAL BLEEDING: 0
FACIAL SWELLING: 0
BACK PAIN: 1
CHOKING: 0
TROUBLE SWALLOWING: 0
EYE PAIN: 0
ABDOMINAL PAIN: 0
SINUS PAIN: 0
SORE THROAT: 0
DIARRHEA: 1
APNEA: 0
WHEEZING: 0
CONSTIPATION: 0
COLOR CHANGE: 0
RHINORRHEA: 0
STRIDOR: 0
ABDOMINAL DISTENTION: 0
VOMITING: 0
SINUS PRESSURE: 0
COUGH: 1
VOICE CHANGE: 0
BLOOD IN STOOL: 0
EYES NEGATIVE: 1

## 2024-09-11 ENCOUNTER — OFFICE VISIT (OUTPATIENT)
Dept: PULMONOLOGY | Age: 89
End: 2024-09-11
Payer: MEDICARE

## 2024-09-11 VITALS
RESPIRATION RATE: 14 BRPM | OXYGEN SATURATION: 96 % | WEIGHT: 115 LBS | HEART RATE: 72 BPM | BODY MASS INDEX: 21.71 KG/M2 | DIASTOLIC BLOOD PRESSURE: 85 MMHG | SYSTOLIC BLOOD PRESSURE: 134 MMHG | HEIGHT: 61 IN

## 2024-09-11 DIAGNOSIS — R05.9 COUGH, UNSPECIFIED TYPE: Primary | ICD-10-CM

## 2024-09-11 DIAGNOSIS — R93.89 ABNORMAL CHEST X-RAY: ICD-10-CM

## 2024-09-11 DIAGNOSIS — R54 AGE-RELATED PHYSICAL DEBILITY: ICD-10-CM

## 2024-09-11 DIAGNOSIS — R63.0 LACK OF APPETITE: ICD-10-CM

## 2024-09-11 PROCEDURE — 99205 OFFICE O/P NEW HI 60 MIN: CPT | Performed by: INTERNAL MEDICINE

## 2024-09-11 PROCEDURE — 1123F ACP DISCUSS/DSCN MKR DOCD: CPT | Performed by: INTERNAL MEDICINE

## 2024-09-17 ENCOUNTER — PATIENT MESSAGE (OUTPATIENT)
Dept: PULMONOLOGY | Age: 89
End: 2024-09-17

## 2024-09-30 ENCOUNTER — PATIENT MESSAGE (OUTPATIENT)
Dept: PULMONOLOGY | Age: 89
End: 2024-09-30

## 2024-09-30 ENCOUNTER — OFFICE VISIT (OUTPATIENT)
Dept: FAMILY MEDICINE CLINIC | Facility: CLINIC | Age: 89
End: 2024-09-30
Payer: MEDICARE

## 2024-09-30 VITALS
HEART RATE: 60 BPM | RESPIRATION RATE: 14 BRPM | SYSTOLIC BLOOD PRESSURE: 122 MMHG | WEIGHT: 110.5 LBS | DIASTOLIC BLOOD PRESSURE: 64 MMHG | OXYGEN SATURATION: 95 % | TEMPERATURE: 97.4 F | BODY MASS INDEX: 20.86 KG/M2 | HEIGHT: 61 IN

## 2024-09-30 DIAGNOSIS — J30.9 ALLERGIC RHINITIS, UNSPECIFIED SEASONALITY, UNSPECIFIED TRIGGER: ICD-10-CM

## 2024-09-30 DIAGNOSIS — K21.9 GASTROESOPHAGEAL REFLUX DISEASE WITHOUT ESOPHAGITIS: ICD-10-CM

## 2024-09-30 DIAGNOSIS — R05.3 PERSISTENT COUGH FOR 3 WEEKS OR LONGER: Primary | ICD-10-CM

## 2024-09-30 DIAGNOSIS — R63.4 UNINTENDED WEIGHT LOSS: ICD-10-CM

## 2024-09-30 PROCEDURE — 1123F ACP DISCUSS/DSCN MKR DOCD: CPT | Performed by: NURSE PRACTITIONER

## 2024-09-30 PROCEDURE — 99213 OFFICE O/P EST LOW 20 MIN: CPT | Performed by: NURSE PRACTITIONER

## 2024-09-30 RX ORDER — BENZONATATE 100 MG/1
100 CAPSULE ORAL 3 TIMES DAILY PRN
Qty: 30 CAPSULE | Refills: 5 | Status: SHIPPED | OUTPATIENT
Start: 2024-09-30

## 2024-09-30 ASSESSMENT — PATIENT HEALTH QUESTIONNAIRE - PHQ9
1. LITTLE INTEREST OR PLEASURE IN DOING THINGS: NOT AT ALL
SUM OF ALL RESPONSES TO PHQ QUESTIONS 1-9: 0
SUM OF ALL RESPONSES TO PHQ9 QUESTIONS 1 & 2: 0
2. FEELING DOWN, DEPRESSED OR HOPELESS: NOT AT ALL
SUM OF ALL RESPONSES TO PHQ QUESTIONS 1-9: 0
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
## 2024-10-01 ENCOUNTER — OFFICE VISIT (OUTPATIENT)
Dept: GASTROENTEROLOGY | Age: 89
End: 2024-10-01
Payer: MEDICARE

## 2024-10-01 ENCOUNTER — TELEPHONE (OUTPATIENT)
Dept: PULMONOLOGY | Age: 89
End: 2024-10-01

## 2024-10-01 VITALS
WEIGHT: 110 LBS | DIASTOLIC BLOOD PRESSURE: 84 MMHG | HEIGHT: 61 IN | SYSTOLIC BLOOD PRESSURE: 138 MMHG | OXYGEN SATURATION: 89 % | HEART RATE: 71 BPM | BODY MASS INDEX: 20.77 KG/M2 | RESPIRATION RATE: 15 BRPM | TEMPERATURE: 98 F

## 2024-10-01 DIAGNOSIS — K21.9 GASTROESOPHAGEAL REFLUX DISEASE, UNSPECIFIED WHETHER ESOPHAGITIS PRESENT: Primary | ICD-10-CM

## 2024-10-01 PROCEDURE — 1123F ACP DISCUSS/DSCN MKR DOCD: CPT | Performed by: STUDENT IN AN ORGANIZED HEALTH CARE EDUCATION/TRAINING PROGRAM

## 2024-10-01 PROCEDURE — 99214 OFFICE O/P EST MOD 30 MIN: CPT | Performed by: STUDENT IN AN ORGANIZED HEALTH CARE EDUCATION/TRAINING PROGRAM

## 2024-10-01 RX ORDER — FAMOTIDINE 40 MG/1
40 TABLET, FILM COATED ORAL EVERY EVENING
Qty: 30 TABLET | Refills: 2 | Status: SHIPPED | OUTPATIENT
Start: 2024-10-01

## 2024-10-01 RX ORDER — OMEPRAZOLE 40 MG/1
40 CAPSULE, DELAYED RELEASE ORAL
Qty: 60 CAPSULE | Refills: 2 | Status: SHIPPED | OUTPATIENT
Start: 2024-10-01

## 2024-10-01 NOTE — TELEPHONE ENCOUNTER
MyChart msg from daughter:    Hi, I’m following up on the CT scan we did on my mom, Sobeida Ugarte. I have read the report as well as Marshall Schmitt( her practitioner )has. Could you please verify what your diagnosis is with my mom’s lungs and if you have a plan moving forward. As I indicated in an earlier message I sent , she was completely u able to cough up sputum. So, if you could let me know if she has MAC or UIP And where we go from here. Thanks! Pretty Perry(Sobeida’s daughter)

## 2024-10-01 NOTE — PATIENT INSTRUCTIONS
Start using omeprazole 40 mg twice daily (30 min before breakfast and dinner) along with pepcid 40 mg at bedtime  Start using Gaviscon (over the counter) as needed in between the medicine.

## 2024-10-01 NOTE — PROGRESS NOTES
194 08/20/2024    MCV 96.0 08/20/2024    CREATININE 0.90 04/12/2024    ALT 34 04/12/2024    AST 27 04/12/2024       Past Medical History:   Diagnosis Date    CAD (coronary artery disease) 2008    Had 4 stents    GERD (gastroesophageal reflux disease)     Has been in ER about 2 mo ago for issues Has issues with dry coughing, vomiting,and bad bowels issues    Hearing loss     Been wearing aids for a long time    Hyperlipidemia     Irritable bowel syndrome     Never professionally diagnosed but thinks she has it    Osteoarthritis Many years    Has it in neck, back and hands    Primary hypertension      Past Surgical History:   Procedure Laterality Date    CARDIAC SURGERY  2008    4 stents    COSMETIC SURGERY  Many    Eyes, and 3 face lifts    HYSTERECTOMY, TOTAL ABDOMINAL (CERVIX REMOVED)  1972    JOINT REPLACEMENT Bilateral 20-30 yrs ago    Knees     Family History   Problem Relation Age of Onset    Alcohol Abuse Father     Alcohol Abuse Brother     Alcohol Abuse Sister     Alcohol Abuse Brother      Social History     Tobacco Use    Smoking status: Never     Passive exposure: Never    Smokeless tobacco: Never   Vaping Use    Vaping status: Never Used   Substance Use Topics    Alcohol use: Yes     Alcohol/week: 14.0 standard drinks of alcohol     Types: 14 Glasses of wine per week     Comment: Alcoholism runs in her family    Drug use: Never     Allergies   Allergen Reactions    Doxycycline Itching    Seasonal      Current Outpatient Medications   Medication Instructions    aspirin 81 mg, Oral, DAILY    benzonatate (TESSALON) 100 mg, Oral, 3 TIMES DAILY PRN    cetirizine (ZYRTEC) 5 mg, Oral, DAILY    Cholecalciferol (VITAMIN D) 50 MCG (2000 UT) CAPS capsule 2,000 Int'l Units, Oral, DAILY    cholestyramine (QUESTRAN) 4 g, Oral, DAILY PRN    ezetimibe (ZETIA) 10 mg, Oral, DAILY    famotidine (PEPCID) 20 mg, Oral, NIGHTLY    fluticasone (FLONASE) 50 MCG/ACT nasal spray 2 sprays, Each Nostril, DAILY    lisinopril

## 2024-10-07 ENCOUNTER — PATIENT MESSAGE (OUTPATIENT)
Dept: PULMONOLOGY | Age: 89
End: 2024-10-07

## 2024-10-08 ENCOUNTER — TELEPHONE (OUTPATIENT)
Dept: PULMONOLOGY | Age: 89
End: 2024-10-08

## 2024-10-09 NOTE — TELEPHONE ENCOUNTER
Direct conversation with Dr. Bedolla that he will need to examine sputum/mucous to make a determination for the next steps.   Advised daughter of this information and she needs to speak to others and research a bit before committing to a bronchoscopy procedure, concerned with the risks r/t patient's age.  She will call back when & if she needs to get scheduled for Bronch w/ BAL.

## 2024-10-10 ENCOUNTER — OFFICE VISIT (OUTPATIENT)
Dept: UROLOGY | Age: 89
End: 2024-10-10
Payer: MEDICARE

## 2024-10-10 DIAGNOSIS — R39.15 URINARY URGENCY: ICD-10-CM

## 2024-10-10 DIAGNOSIS — N39.0 URINARY TRACT INFECTION WITHOUT HEMATURIA, SITE UNSPECIFIED: Primary | ICD-10-CM

## 2024-10-10 PROCEDURE — 99214 OFFICE O/P EST MOD 30 MIN: CPT | Performed by: NURSE PRACTITIONER

## 2024-10-10 PROCEDURE — 1123F ACP DISCUSS/DSCN MKR DOCD: CPT | Performed by: NURSE PRACTITIONER

## 2024-10-10 RX ORDER — TRIMETHOPRIM 100 MG/1
100 TABLET ORAL 2 TIMES DAILY
Qty: 60 TABLET | Refills: 11 | Status: SHIPPED | OUTPATIENT
Start: 2024-10-10

## 2024-10-10 RX ORDER — SOLIFENACIN SUCCINATE 5 MG/1
5 TABLET, FILM COATED ORAL DAILY
Qty: 90 TABLET | Refills: 3 | Status: SHIPPED | OUTPATIENT
Start: 2024-10-10 | End: 2025-10-10

## 2024-10-10 NOTE — PROGRESS NOTES
[facility]  [address] [place] [id number]  [phone]          Sobeida Ugarte  : 1931    No chief complaint on file.         HPI     Sobeida Ugarte is a 89 y.o. female    Returns today for follow-up on recurrent urinary infection.  She continues daily antibiotic suppression of Macrobid.    The daughter tells me that the patient has missed several doses but has been at least getting in 3 a week.  A urine spec was brought last week due to pt being confused. Urine however was clear. She is unable to bring me urine today. She is also taking oxybutynin for urinary frequency.       Patient is asymptomatic today.  No urinary symptoms.     Initially she was  referred by primary care physician due to recurrent urinary infections. Patient is accompanied by her daughter who assists with the HPI. Patient does have memory issues.     The daughter tells me in the last year and a half they have been to the ER twice due to symptoms of altered mental status confusion and lethargy.  She also will lose her appetite become nauseated and has worsening balance.  With each visit she has been diagnosed with a urinary infection.  Once she starts the antibiotic the symptoms resolved pretty quickly.    As far as urinary frequency she feels that she voids normally.  She does occasionally have urge incontinence.  It is usually problematic when she does wear an adult depends but only goes through 2 of these daily typically.     There have been no fever chills or gross hematuria.  The patient tells me that she would have a rare infections throughout her life but never had them recurrent         Past Medical History:   Diagnosis Date    CAD (coronary artery disease)     Had 4 stents    GERD (gastroesophageal reflux disease)     Has been in ER about 2 mo ago for issues Has issues with dry coughing, vomiting,and bad bowels issues    Hearing loss     Been wearing aids for a long time

## 2024-10-16 ASSESSMENT — ENCOUNTER SYMPTOMS
ABDOMINAL PAIN: 0
EYES NEGATIVE: 1
WHEEZING: 0
VOICE CHANGE: 0
CHEST TIGHTNESS: 0
RECTAL PAIN: 0
SINUS PRESSURE: 0
ABDOMINAL DISTENTION: 0
CONSTIPATION: 0
CHOKING: 0
COUGH: 1
APNEA: 0
ANAL BLEEDING: 0
VOMITING: 0
COLOR CHANGE: 0
EYE DISCHARGE: 0
SORE THROAT: 0
TROUBLE SWALLOWING: 0
FACIAL SWELLING: 0
STRIDOR: 0
DIARRHEA: 1
BLOOD IN STOOL: 0
SINUS PAIN: 0
RHINORRHEA: 0
EYE PAIN: 0
NAUSEA: 1
SHORTNESS OF BREATH: 0
BACK PAIN: 1

## 2024-10-17 DIAGNOSIS — L30.1 DYSHIDROTIC ECZEMA: Primary | ICD-10-CM

## 2024-10-17 RX ORDER — TRIAMCINOLONE ACETONIDE 1 MG/G
OINTMENT TOPICAL 2 TIMES DAILY
Qty: 30 G | Refills: 0 | Status: SHIPPED | OUTPATIENT
Start: 2024-10-17 | End: 2024-10-24

## 2024-10-18 ENCOUNTER — TELEPHONE (OUTPATIENT)
Dept: PULMONOLOGY | Age: 89
End: 2024-10-18

## 2024-10-18 NOTE — TELEPHONE ENCOUNTER
LOV 9/11/2024 [place]-several months worth of persistent coughing and a very abnormal chest x-ray here for evaluation     HRCT chest ordered and 3 separate daily sputum samples to be sent for AFB and culture-patient  Ct chest 9/16/2024  IMPRESSION:  Honeycombing and fibrosis with bronchiectasis within bilateral lung fields  concerning for UIP.     Electronically signed by Huma Beck           Exam Ended: 09/16/24 10:59 EDT        Call 10/8/2024:  Signed         Direct conversation with Dr. Bedolla that he will need to examine sputum/mucous to make a determination for the next steps.   Advised daughter of this information and she needs to speak to others and research a bit before committing to a bronchoscopy procedure, concerned with the risks r/t patient's age.  She will call back when & if she needs to get scheduled for Bronch w/ BAL.               Electronically signed by Monae Hernandez RN at 10/9/2024  9:45 AM    I have spoken with patient's daughter, Sobeida. She reports that patient has opted to not have bronchoscopy as she would not undergo triple antibiotic therapy for MAC. Patient is coughing. No fever. Patient is coughing, weaker and unable to cough anything up. Patient's SpO2 last was 95%. We have discussed difficultly in treating without specimen for testing.  Asking for provider input on any other possible solutions.  Patient is scheduled on 10/25 at 1 pm to see Dr Gaona. Appointment will be kept in [place] with Dr Hutton.

## 2024-10-18 NOTE — TELEPHONE ENCOUNTER
Brenda from [facility] . Says that the patient has been complaining about her breathing . She told them no one has contacted her from our office . Marshall Linder N.P. says that he wants her to see pulmonary asap.     Brenda   [phone]

## 2024-10-25 ENCOUNTER — OFFICE VISIT (OUTPATIENT)
Dept: PULMONOLOGY | Age: 89
End: 2024-10-25

## 2024-10-25 VITALS
OXYGEN SATURATION: 88 % | BODY MASS INDEX: 20.77 KG/M2 | HEART RATE: 79 BPM | DIASTOLIC BLOOD PRESSURE: 70 MMHG | WEIGHT: 110 LBS | HEIGHT: 61 IN | SYSTOLIC BLOOD PRESSURE: 120 MMHG | TEMPERATURE: 98 F | RESPIRATION RATE: 20 BRPM

## 2024-10-25 DIAGNOSIS — R13.10 DYSPHAGIA, UNSPECIFIED TYPE: Primary | ICD-10-CM

## 2024-10-25 DIAGNOSIS — J84.89 NSIP (NONSPECIFIC INTERSTITIAL PNEUMONIA) (HCC): ICD-10-CM

## 2024-10-25 DIAGNOSIS — J47.1 BRONCHIECTASIS WITH ACUTE EXACERBATION (HCC): ICD-10-CM

## 2024-10-25 DIAGNOSIS — R54 AGE-RELATED PHYSICAL DEBILITY: ICD-10-CM

## 2024-10-25 DIAGNOSIS — R93.89 ABNORMAL CHEST X-RAY: ICD-10-CM

## 2024-10-25 DIAGNOSIS — R05.9 COUGH, UNSPECIFIED TYPE: ICD-10-CM

## 2024-10-25 LAB
ALBUMIN SERPL-MCNC: 3.3 G/DL (ref 3.2–4.6)
ALBUMIN/GLOB SERPL: 0.7 (ref 1–1.9)
ALP SERPL-CCNC: 88 U/L (ref 35–104)
ALT SERPL-CCNC: 47 U/L (ref 8–45)
ANION GAP SERPL CALC-SCNC: 9 MMOL/L (ref 9–18)
AST SERPL-CCNC: 51 U/L (ref 15–37)
BASOPHILS # BLD: 0.1 K/UL (ref 0–0.2)
BASOPHILS NFR BLD: 1 % (ref 0–2)
BILIRUB SERPL-MCNC: 0.6 MG/DL (ref 0–1.2)
BUN SERPL-MCNC: 9 MG/DL (ref 8–23)
CALCIUM SERPL-MCNC: 9 MG/DL (ref 8.8–10.2)
CHLORIDE SERPL-SCNC: 95 MMOL/L (ref 98–107)
CO2 SERPL-SCNC: 26 MMOL/L (ref 20–28)
CREAT SERPL-MCNC: 0.83 MG/DL (ref 0.6–1.1)
DIFFERENTIAL METHOD BLD: NORMAL
EOSINOPHIL # BLD: 0.1 K/UL (ref 0–0.8)
EOSINOPHIL NFR BLD: 1 % (ref 0.5–7.8)
ERYTHROCYTE [DISTWIDTH] IN BLOOD BY AUTOMATED COUNT: 14.3 % (ref 11.9–14.6)
ERYTHROCYTE [SEDIMENTATION RATE] IN BLOOD: 14 MM/HR (ref 0–30)
GLOBULIN SER CALC-MCNC: 4.5 G/DL (ref 2.3–3.5)
GLUCOSE SERPL-MCNC: 92 MG/DL (ref 70–99)
HCT VFR BLD AUTO: 40.4 % (ref 35.8–46.3)
HGB BLD-MCNC: 13.4 G/DL (ref 11.7–15.4)
IMM GRANULOCYTES # BLD AUTO: 0 K/UL (ref 0–0.5)
IMM GRANULOCYTES NFR BLD AUTO: 0 % (ref 0–5)
LYMPHOCYTES # BLD: 1.2 K/UL (ref 0.5–4.6)
LYMPHOCYTES NFR BLD: 14 % (ref 13–44)
MCH RBC QN AUTO: 31.1 PG (ref 26.1–32.9)
MCHC RBC AUTO-ENTMCNC: 33.2 G/DL (ref 31.4–35)
MCV RBC AUTO: 93.7 FL (ref 82–102)
MONOCYTES # BLD: 1 K/UL (ref 0.1–1.3)
MONOCYTES NFR BLD: 12 % (ref 4–12)
NEUTS SEG # BLD: 5.8 K/UL (ref 1.7–8.2)
NEUTS SEG NFR BLD: 72 % (ref 43–78)
NRBC # BLD: 0 K/UL (ref 0–0.2)
PLATELET # BLD AUTO: 210 K/UL (ref 150–450)
PMV BLD AUTO: 9.7 FL (ref 9.4–12.3)
POTASSIUM SERPL-SCNC: 4.4 MMOL/L (ref 3.5–5.1)
PROT SERPL-MCNC: 7.8 G/DL (ref 6.3–8.2)
RBC # BLD AUTO: 4.31 M/UL (ref 4.05–5.2)
SODIUM SERPL-SCNC: 129 MMOL/L (ref 136–145)
WBC # BLD AUTO: 8.1 K/UL (ref 4.3–11.1)

## 2024-10-25 RX ORDER — ALBUTEROL SULFATE 0.83 MG/ML
2.5 SOLUTION RESPIRATORY (INHALATION) ONCE
Status: COMPLETED | OUTPATIENT
Start: 2024-10-25 | End: 2024-10-25

## 2024-10-25 RX ORDER — ALBUTEROL SULFATE 0.83 MG/ML
2.5 SOLUTION RESPIRATORY (INHALATION) EVERY 6 HOURS PRN
Qty: 120 EACH | Refills: 11 | Status: SHIPPED | OUTPATIENT
Start: 2024-10-25

## 2024-10-25 RX ADMIN — ALBUTEROL SULFATE 2.5 MG: 0.83 SOLUTION RESPIRATORY (INHALATION) at 14:19

## 2024-10-25 NOTE — PROGRESS NOTES
history on file for this patient.  Past Medical History:   Diagnosis Date    CAD (coronary artery disease) 2008    Had 4 stents    GERD (gastroesophageal reflux disease)     Has been in ER about 2 mo ago for issues Has issues with dry coughing, vomiting,and bad bowels issues    Hearing loss     Been wearing aids for a long time    Hyperlipidemia     Irritable bowel syndrome     Never professionally diagnosed but thinks she has it    Osteoarthritis Many years    Has it in neck, back and hands    Primary hypertension         Tobacco Use      Smoking status: Never        Passive exposure: Never      Smokeless tobacco: Never    Allergies   Allergen Reactions    Doxycycline Itching    Seasonal      Current Outpatient Medications   Medication Instructions    aspirin 81 mg, Oral, DAILY    benzonatate (TESSALON) 100 mg, Oral, 3 TIMES DAILY PRN    cetirizine (ZYRTEC) 5 mg, Oral, DAILY    Cholecalciferol (VITAMIN D) 50 MCG (2000 UT) CAPS capsule 2,000 Int'l Units, Oral, DAILY    cholestyramine (QUESTRAN) 4 g, Oral, DAILY PRN    ezetimibe (ZETIA) 10 mg, Oral, DAILY    famotidine (PEPCID) 40 mg, Oral, EVERY EVENING    fluticasone (FLONASE) 50 MCG/ACT nasal spray 2 sprays, Each Nostril, DAILY    lisinopril (PRINIVIL;ZESTRIL) 10 mg, Oral, DAILY, TAKE 1 TABLET BY MOUTH DAILY FOR 90 DAYS    omeprazole (PRILOSEC) 40 mg, Oral, 2 TIMES DAILY BEFORE MEALS    ondansetron (ZOFRAN) 4 mg, Oral, 3 TIMES DAILY PRN    solifenacin (VESICARE) 5 mg, Oral, DAILY    trimethoprim (TRIMPEX) 100 mg, Oral, 2 TIMES DAILY    valACYclovir (VALTREX) 1 g tablet TAKE 2 TABLETS AT THE ONSET OF THE COLD SORE, AND 2 TABLETS 12 HOURS LATER

## 2024-10-27 LAB — ANA SER QL: NEGATIVE

## 2024-10-28 ENCOUNTER — TELEPHONE (OUTPATIENT)
Dept: GASTROENTEROLOGY | Age: 89
End: 2024-10-28

## 2024-10-28 LAB — RHEUMATOID FACT SER QL LA: NEGATIVE

## 2024-10-28 NOTE — TELEPHONE ENCOUNTER
Called pt's emergency contact to determine how they would like to proceed. Pt's emergency contact states that due to recent change to O2 by pulmonologist, they prefer to remain on current dose of omeprazolge BID with famotidine qhs.     \"Per my discussion in the office if she were to continue having cough then we were planning to switch her to from omeprazole to Nexium 40 mg twice daily AC and she will continue Pepcid.  If this does not also help then we will move onto Dexilant 60 mg daily in the morning and Pepcid at bedtime.  She can give us an update with Nexium/Pepcid regimen in 2 weeks.\"

## 2024-10-29 LAB
C-ANCA TITR SER IF: NORMAL TITER
MYELOPEROXIDASE AB SER IA-ACNC: <0.2 UNITS (ref 0–0.9)
P-ANCA ATYPICAL TITR SER IF: NORMAL TITER
P-ANCA TITR SER IF: NORMAL TITER
PROTEINASE3 AB SER IA-ACNC: <0.2 UNITS (ref 0–0.9)

## 2024-11-01 ENCOUNTER — TELEPHONE (OUTPATIENT)
Age: 89
End: 2024-11-01

## 2024-11-01 NOTE — TELEPHONE ENCOUNTER
Patient's daughter (America Landry) stated that they have not heard from the DME in regards of the patient's O2, POC or Nebulizer.  I called America Landry and explained that the order has been sent to [facility] and provided her the number via [employer] since the patient was driving.  Patient also stated that they would like to get a referral sent for Home Health.  I sent a message to Triage to further evaluate this.  No further questions or concerns were asked at this time. // Aliza Leone [facility]

## 2024-11-06 ENCOUNTER — TELEPHONE (OUTPATIENT)
Dept: PULMONOLOGY | Age: 89
End: 2024-11-06

## 2024-11-06 DIAGNOSIS — R54 AGE-RELATED PHYSICAL DEBILITY: Primary | ICD-10-CM

## 2024-11-06 DIAGNOSIS — J84.89 NSIP (NONSPECIFIC INTERSTITIAL PNEUMONIA) (HCC): ICD-10-CM

## 2024-11-06 DIAGNOSIS — R63.0 LACK OF APPETITE: ICD-10-CM

## 2024-11-06 NOTE — TELEPHONE ENCOUNTER
Aliza Leone, MA  [facility] Triage  Patient's daughter is requesting a Home Health Nurse.  Stating that they spoke with Dr. Gaona in regards of this. // Aliza MARRERO    I have spoken with Dr Gaona who approves /HH referral.  I have notified DENISE Jacobo.  While on phone, she reports that patient has not yet had O2 delivered.  She is aware that I will call [facility] upon conclusion of this call.  I have spoken with Renaldo at [facility].  Order for O2 is completed in [employer] and Renaldo will contact patient's daughter at this time. HH referral faxed to [facility].

## 2024-11-07 DIAGNOSIS — I10 PRIMARY HYPERTENSION: ICD-10-CM

## 2024-11-07 RX ORDER — LISINOPRIL 10 MG/1
10 TABLET ORAL DAILY
Qty: 90 TABLET | Refills: 1 | Status: SHIPPED | OUTPATIENT
Start: 2024-11-07

## 2024-11-12 ENCOUNTER — TELEPHONE (OUTPATIENT)
Dept: PULMONOLOGY | Age: 89
End: 2024-11-12

## 2024-11-12 NOTE — TELEPHONE ENCOUNTER
Tarah with [facility] called to let you know that the patient declined social work. But will be having home health care .        Tarah   [phone]

## 2024-11-15 ENCOUNTER — TELEPHONE (OUTPATIENT)
Dept: PULMONOLOGY | Age: 89
End: 2024-11-15

## 2024-11-18 RX ORDER — ALBUTEROL SULFATE 90 UG/1
2 INHALANT RESPIRATORY (INHALATION) EVERY 6 HOURS PRN
Qty: 18 G | Refills: 3 | Status: SHIPPED | OUTPATIENT
Start: 2024-11-18

## 2024-11-26 ENCOUNTER — TELEPHONE (OUTPATIENT)
Dept: FAMILY MEDICINE CLINIC | Facility: CLINIC | Age: 88
End: 2024-11-26

## 2024-11-26 NOTE — TELEPHONE ENCOUNTER
Home health nurse called and wanted to let you know that patient reported waking up and getting out of bed and slid to floor, no injuries.

## 2024-12-03 ENCOUNTER — LAB (OUTPATIENT)
Dept: FAMILY MEDICINE CLINIC | Facility: CLINIC | Age: 88
End: 2024-12-03

## 2024-12-03 DIAGNOSIS — K58.0 IRRITABLE BOWEL SYNDROME WITH DIARRHEA: ICD-10-CM

## 2024-12-03 DIAGNOSIS — B00.1 COLD SORE: ICD-10-CM

## 2024-12-03 DIAGNOSIS — I10 PRIMARY HYPERTENSION: ICD-10-CM

## 2024-12-03 DIAGNOSIS — E78.2 MIXED HYPERLIPIDEMIA: ICD-10-CM

## 2024-12-03 DIAGNOSIS — I25.10 CORONARY ARTERY DISEASE INVOLVING NATIVE CORONARY ARTERY OF NATIVE HEART WITHOUT ANGINA PECTORIS: ICD-10-CM

## 2024-12-03 DIAGNOSIS — K21.9 GASTROESOPHAGEAL REFLUX DISEASE WITHOUT ESOPHAGITIS: ICD-10-CM

## 2024-12-03 DIAGNOSIS — J30.9 ALLERGIC RHINITIS, UNSPECIFIED SEASONALITY, UNSPECIFIED TRIGGER: ICD-10-CM

## 2024-12-03 LAB
ALBUMIN SERPL-MCNC: 3.1 G/DL (ref 3.2–4.6)
ALBUMIN/GLOB SERPL: 0.7 (ref 1–1.9)
ALP SERPL-CCNC: 83 U/L (ref 35–104)
ALT SERPL-CCNC: 24 U/L (ref 8–45)
ANION GAP SERPL CALC-SCNC: 10 MMOL/L (ref 7–16)
AST SERPL-CCNC: 39 U/L (ref 15–37)
BASOPHILS # BLD: 0.1 K/UL (ref 0–0.2)
BASOPHILS NFR BLD: 2 % (ref 0–2)
BILIRUB SERPL-MCNC: 0.7 MG/DL (ref 0–1.2)
BUN SERPL-MCNC: 8 MG/DL (ref 8–23)
CALCIUM SERPL-MCNC: 9.2 MG/DL (ref 8.8–10.2)
CHLORIDE SERPL-SCNC: 98 MMOL/L (ref 98–107)
CHOLEST SERPL-MCNC: 152 MG/DL (ref 0–200)
CO2 SERPL-SCNC: 27 MMOL/L (ref 20–29)
CREAT SERPL-MCNC: 0.92 MG/DL (ref 0.6–1.1)
DIFFERENTIAL METHOD BLD: ABNORMAL
EOSINOPHIL # BLD: 0.5 K/UL (ref 0–0.8)
EOSINOPHIL NFR BLD: 7 % (ref 0.5–7.8)
ERYTHROCYTE [DISTWIDTH] IN BLOOD BY AUTOMATED COUNT: 14.7 % (ref 11.9–14.6)
GLOBULIN SER CALC-MCNC: 4.4 G/DL (ref 2.3–3.5)
GLUCOSE SERPL-MCNC: 91 MG/DL (ref 70–99)
HCT VFR BLD AUTO: 40.4 % (ref 35.8–46.3)
HDLC SERPL-MCNC: 88 MG/DL (ref 40–60)
HDLC SERPL: 1.7 (ref 0–5)
HGB BLD-MCNC: 13.6 G/DL (ref 11.7–15.4)
IMM GRANULOCYTES # BLD AUTO: 0 K/UL (ref 0–0.5)
IMM GRANULOCYTES NFR BLD AUTO: 0 % (ref 0–5)
LDLC SERPL CALC-MCNC: 49 MG/DL (ref 0–100)
LYMPHOCYTES # BLD: 1.6 K/UL (ref 0.5–4.6)
LYMPHOCYTES NFR BLD: 25 % (ref 13–44)
MCH RBC QN AUTO: 32.3 PG (ref 26.1–32.9)
MCHC RBC AUTO-ENTMCNC: 33.7 G/DL (ref 31.4–35)
MCV RBC AUTO: 96 FL (ref 82–102)
MONOCYTES # BLD: 0.9 K/UL (ref 0.1–1.3)
MONOCYTES NFR BLD: 15 % (ref 4–12)
NEUTS SEG # BLD: 3.1 K/UL (ref 1.7–8.2)
NEUTS SEG NFR BLD: 51 % (ref 43–78)
NRBC # BLD: 0 K/UL (ref 0–0.2)
PLATELET # BLD AUTO: 213 K/UL (ref 150–450)
PMV BLD AUTO: 10 FL (ref 9.4–12.3)
POTASSIUM SERPL-SCNC: 4.7 MMOL/L (ref 3.5–5.1)
PROT SERPL-MCNC: 7.5 G/DL (ref 6.3–8.2)
RBC # BLD AUTO: 4.21 M/UL (ref 4.05–5.2)
SODIUM SERPL-SCNC: 135 MMOL/L (ref 136–145)
T4 FREE SERPL-MCNC: 1.2 NG/DL (ref 0.9–1.7)
TRIGL SERPL-MCNC: 72 MG/DL (ref 0–150)
TSH, 3RD GENERATION: 2 UIU/ML (ref 0.27–4.2)
VLDLC SERPL CALC-MCNC: 14 MG/DL (ref 6–23)
WBC # BLD AUTO: 6.2 K/UL (ref 4.3–11.1)

## 2024-12-09 ENCOUNTER — OFFICE VISIT (OUTPATIENT)
Dept: FAMILY MEDICINE CLINIC | Facility: CLINIC | Age: 88
End: 2024-12-09

## 2024-12-09 VITALS
SYSTOLIC BLOOD PRESSURE: 112 MMHG | OXYGEN SATURATION: 100 % | HEART RATE: 81 BPM | RESPIRATION RATE: 18 BRPM | WEIGHT: 106.4 LBS | DIASTOLIC BLOOD PRESSURE: 68 MMHG | BODY MASS INDEX: 19.58 KG/M2 | HEIGHT: 62 IN

## 2024-12-09 DIAGNOSIS — R41.3 MEMORY LOSS: ICD-10-CM

## 2024-12-09 DIAGNOSIS — N28.9 RENAL INSUFFICIENCY: ICD-10-CM

## 2024-12-09 DIAGNOSIS — K21.9 GASTROESOPHAGEAL REFLUX DISEASE WITHOUT ESOPHAGITIS: ICD-10-CM

## 2024-12-09 DIAGNOSIS — I10 PRIMARY HYPERTENSION: ICD-10-CM

## 2024-12-09 DIAGNOSIS — J47.9 BRONCHIECTASIS WITHOUT COMPLICATION (HCC): Primary | ICD-10-CM

## 2024-12-09 DIAGNOSIS — L40.9 PSORIASIS: ICD-10-CM

## 2024-12-09 DIAGNOSIS — N39.0 FREQUENT UTI: ICD-10-CM

## 2024-12-09 DIAGNOSIS — J30.9 ALLERGIC RHINITIS, UNSPECIFIED SEASONALITY, UNSPECIFIED TRIGGER: ICD-10-CM

## 2024-12-09 DIAGNOSIS — E78.2 MIXED HYPERLIPIDEMIA: ICD-10-CM

## 2024-12-09 DIAGNOSIS — E55.9 VITAMIN D DEFICIENCY: ICD-10-CM

## 2024-12-09 DIAGNOSIS — M19.90 OSTEOARTHRITIS, UNSPECIFIED OSTEOARTHRITIS TYPE, UNSPECIFIED SITE: ICD-10-CM

## 2024-12-09 DIAGNOSIS — Z23 ENCOUNTER FOR IMMUNIZATION: ICD-10-CM

## 2024-12-09 DIAGNOSIS — H91.93 BILATERAL HEARING LOSS, UNSPECIFIED HEARING LOSS TYPE: ICD-10-CM

## 2024-12-09 DIAGNOSIS — R93.89 ABNORMAL CXR: ICD-10-CM

## 2024-12-09 DIAGNOSIS — B00.1 COLD SORE: ICD-10-CM

## 2024-12-09 DIAGNOSIS — R74.01 ELEVATED AST (SGOT): ICD-10-CM

## 2024-12-09 DIAGNOSIS — I25.10 CORONARY ARTERY DISEASE INVOLVING NATIVE CORONARY ARTERY OF NATIVE HEART WITHOUT ANGINA PECTORIS: ICD-10-CM

## 2024-12-09 DIAGNOSIS — K58.0 IRRITABLE BOWEL SYNDROME WITH DIARRHEA: ICD-10-CM

## 2024-12-09 DIAGNOSIS — N32.81 OAB (OVERACTIVE BLADDER): ICD-10-CM

## 2024-12-09 LAB
25(OH)D3 SERPL-MCNC: 26.5 NG/ML (ref 30–100)
ALBUMIN SERPL-MCNC: 3 G/DL (ref 3.2–4.6)
ALBUMIN/GLOB SERPL: 0.7 (ref 1–1.9)
ALP SERPL-CCNC: 77 U/L (ref 35–104)
ALT SERPL-CCNC: 21 U/L (ref 8–45)
ANION GAP SERPL CALC-SCNC: 9 MMOL/L (ref 7–16)
AST SERPL-CCNC: 35 U/L (ref 15–37)
BILIRUB SERPL-MCNC: 0.5 MG/DL (ref 0–1.2)
BUN SERPL-MCNC: 11 MG/DL (ref 8–23)
CALCIUM SERPL-MCNC: 8.7 MG/DL (ref 8.8–10.2)
CHLORIDE SERPL-SCNC: 97 MMOL/L (ref 98–107)
CO2 SERPL-SCNC: 25 MMOL/L (ref 20–29)
CREAT SERPL-MCNC: 0.86 MG/DL (ref 0.6–1.1)
GLOBULIN SER CALC-MCNC: 4.2 G/DL (ref 2.3–3.5)
GLUCOSE SERPL-MCNC: 105 MG/DL (ref 70–99)
POTASSIUM SERPL-SCNC: 5 MMOL/L (ref 3.5–5.1)
PROT SERPL-MCNC: 7.2 G/DL (ref 6.3–8.2)
SODIUM SERPL-SCNC: 131 MMOL/L (ref 136–145)

## 2024-12-09 ASSESSMENT — PATIENT HEALTH QUESTIONNAIRE - PHQ9
SUM OF ALL RESPONSES TO PHQ9 QUESTIONS 1 & 2: 0
SUM OF ALL RESPONSES TO PHQ QUESTIONS 1-9: 0
1. LITTLE INTEREST OR PLEASURE IN DOING THINGS: NOT AT ALL
2. FEELING DOWN, DEPRESSED OR HOPELESS: NOT AT ALL
SUM OF ALL RESPONSES TO PHQ QUESTIONS 1-9: 0

## 2024-12-09 NOTE — PROGRESS NOTES
\"Have you been to the ER, urgent care clinic since your last visit?  Hospitalized since your last visit?\"    NO    “Have you seen or consulted any other health care providers outside our system since your last visit?”    NO            
white matter changes that are common with age and can be contributing to memory loss). Pt on Trimethoprim 100 mg po bid to prevent UTI. Pt denies any urinary symptoms except for urinary frequency from OAB. Pt A&O x 3 today. Discussed with pt/daughter. Will have pt continue medications/POC per PGU. Pt's daughter agrees to let me know if pt's confusion worsens again and I will refer her again to Neurology. Pt to f/u with me in 6 months. Will monitor.   - Comprehensive Metabolic Panel; Future  - Comprehensive Metabolic Panel; Future    13. Memory loss  See # 11-12 above.   - TSH with Reflex; Future    14. Vitamin D deficiency  Pt has Vitamin D deficiency. Pt reports taking Vitamin D 2000 units po daily as directed. For some reason, pt's Vitamin D level was not drawn prior to this appointment. Discussed with pt/daughter. Will have pt continue Vitamin D at same dose for now. Will check Vitamin D level and adjust dose of Vitamin D if needed based on result. Pt to f/u with me in 6 months. Will monitor.   - Vitamin D 25 Hydroxy; Future  - Vitamin D 25 Hydroxy; Future    15. Osteoarthritis, unspecified osteoarthritis type, unspecified site  Pt has OA pain to neck, back, hands-relieved by occasional PRN OTC Aleve, but this was discouraged previously and pt recommended to take PRN OTC Tylenol Arthritis as directed due to age/past mild renal insufficiency/hx of GERD. Pt reports using rolling walker as directed and is extra careful to prevent falls. Pt has cut back on her consumption of wine. Discussed with pt/daughter. Pt again encouraged to avoid PRN OTC Aleve and other NSAIDs and take PRN OTC Tylenol Arthritis sparingly as directed for her OA pain. Pt encouraged to continue to use rolling walker. Pt to f/u with me in 6 months. Will monitor.     16. Cold sore  Pt has hx of cold sores to her mouth. Takes PRN Valtrex with relief. Pt denies having any recent cold sores. Discussed with pt/daughter. Will have pt continue PRN

## 2024-12-10 PROBLEM — E87.1 HYPONATREMIA: Status: ACTIVE | Noted: 2024-12-10

## 2024-12-12 ENCOUNTER — APPOINTMENT (OUTPATIENT)
Dept: GENERAL RADIOLOGY | Age: 88
End: 2024-12-12
Payer: MEDICARE

## 2024-12-12 ENCOUNTER — HOSPITAL ENCOUNTER (INPATIENT)
Age: 88
LOS: 1 days | End: 2024-12-13
Attending: GENERAL PRACTICE
Payer: MEDICARE

## 2024-12-12 DIAGNOSIS — A41.9 SEPTICEMIA (HCC): ICD-10-CM

## 2024-12-12 DIAGNOSIS — J18.9 ACUTE PNEUMONIA: Primary | ICD-10-CM

## 2024-12-12 DIAGNOSIS — G93.41 METABOLIC ENCEPHALOPATHY: ICD-10-CM

## 2024-12-12 PROBLEM — R41.82 AMS (ALTERED MENTAL STATUS): Status: ACTIVE | Noted: 2024-12-12

## 2024-12-12 LAB
ALBUMIN SERPL-MCNC: 2.9 G/DL (ref 3.2–4.6)
ALBUMIN/GLOB SERPL: 0.6 (ref 1–1.9)
ALP SERPL-CCNC: 99 U/L (ref 35–104)
ALT SERPL-CCNC: 15 U/L (ref 8–45)
AMMONIA PLAS-SCNC: 11 UMOL/L (ref 11–51)
ANION GAP SERPL CALC-SCNC: 15 MMOL/L (ref 7–16)
APPEARANCE UR: CLEAR
AST SERPL-CCNC: 48 U/L (ref 15–37)
BACTERIA URNS QL MICRO: NEGATIVE /HPF
BASOPHILS # BLD: 0 K/UL (ref 0–0.2)
BASOPHILS NFR BLD: 0 % (ref 0–2)
BILIRUB SERPL-MCNC: 1.2 MG/DL (ref 0–1.2)
BILIRUB UR QL: NEGATIVE
BUN SERPL-MCNC: 16 MG/DL (ref 8–23)
CALCIUM SERPL-MCNC: 8.9 MG/DL (ref 8.8–10.2)
CHLORIDE SERPL-SCNC: 92 MMOL/L (ref 98–107)
CK SERPL-CCNC: 85 U/L (ref 21–215)
CO2 SERPL-SCNC: 22 MMOL/L (ref 20–29)
COLOR UR: ABNORMAL
CREAT SERPL-MCNC: 0.88 MG/DL (ref 0.6–1.1)
DIFFERENTIAL METHOD BLD: ABNORMAL
EKG ATRIAL RATE: 166 BPM
EKG DIAGNOSIS: NORMAL
EKG P AXIS: 77 DEGREES
EKG P-R INTERVAL: 196 MS
EKG Q-T INTERVAL: 317 MS
EKG QRS DURATION: 78 MS
EKG QTC CALCULATION (BAZETT): 431 MS
EKG R AXIS: 43 DEGREES
EKG T AXIS: 62 DEGREES
EKG VENTRICULAR RATE: 111 BPM
EOSINOPHIL # BLD: 0 K/UL (ref 0–0.8)
EOSINOPHIL NFR BLD: 0 % (ref 0.5–7.8)
EPI CELLS #/AREA URNS HPF: ABNORMAL /HPF
ERYTHROCYTE [DISTWIDTH] IN BLOOD BY AUTOMATED COUNT: 14.6 % (ref 11.9–14.6)
FOLATE SERPL-MCNC: 4.4 NG/ML (ref 3.1–17.5)
GLOBULIN SER CALC-MCNC: 5 G/DL (ref 2.3–3.5)
GLUCOSE SERPL-MCNC: 110 MG/DL (ref 70–99)
GLUCOSE UR STRIP.AUTO-MCNC: NEGATIVE MG/DL
HCT VFR BLD AUTO: 40.2 % (ref 35.8–46.3)
HGB BLD-MCNC: 13.7 G/DL (ref 11.7–15.4)
HGB UR QL STRIP: NEGATIVE
HYALINE CASTS URNS QL MICRO: ABNORMAL /LPF
IMM GRANULOCYTES # BLD AUTO: 0.1 K/UL (ref 0–0.5)
IMM GRANULOCYTES NFR BLD AUTO: 1 % (ref 0–5)
KETONES UR QL STRIP.AUTO: ABNORMAL MG/DL
LACTATE SERPL-SCNC: 3 MMOL/L (ref 0.5–2)
LACTATE SERPL-SCNC: 3 MMOL/L (ref 0.5–2)
LACTATE SERPL-SCNC: 3.9 MMOL/L (ref 0.5–2)
LACTATE SERPL-SCNC: 5.8 MMOL/L (ref 0.5–2)
LEUKOCYTE ESTERASE UR QL STRIP.AUTO: NEGATIVE
LYMPHOCYTES # BLD: 0.6 K/UL (ref 0.5–4.6)
LYMPHOCYTES NFR BLD: 4 % (ref 13–44)
MCH RBC QN AUTO: 31.7 PG (ref 26.1–32.9)
MCHC RBC AUTO-ENTMCNC: 34.1 G/DL (ref 31.4–35)
MCV RBC AUTO: 93.1 FL (ref 82–102)
MONOCYTES # BLD: 1.4 K/UL (ref 0.1–1.3)
MONOCYTES NFR BLD: 8 % (ref 4–12)
NEUTS SEG # BLD: 14.2 K/UL (ref 1.7–8.2)
NEUTS SEG NFR BLD: 87 % (ref 43–78)
NITRITE UR QL STRIP.AUTO: NEGATIVE
NRBC # BLD: 0 K/UL (ref 0–0.2)
PH UR STRIP: 6 (ref 5–9)
PLATELET # BLD AUTO: 240 K/UL (ref 150–450)
PMV BLD AUTO: 10.2 FL (ref 9.4–12.3)
POTASSIUM SERPL-SCNC: 5.3 MMOL/L (ref 3.5–5.1)
PROT SERPL-MCNC: 7.8 G/DL (ref 6.3–8.2)
PROT UR STRIP-MCNC: 30 MG/DL
RBC # BLD AUTO: 4.32 M/UL (ref 4.05–5.2)
RBC #/AREA URNS HPF: ABNORMAL /HPF
SODIUM SERPL-SCNC: 129 MMOL/L (ref 136–145)
SP GR UR REFRACTOMETRY: 1.02 (ref 1–1.02)
T PALLIDUM AB SER QL IA: NONREACTIVE
TSH W FREE THYROID IF ABNORMAL: 1.08 UIU/ML (ref 0.27–4.2)
UROBILINOGEN UR QL STRIP.AUTO: 0.2 EU/DL (ref 0.2–1)
VIT B12 SERPL-MCNC: 2563 PG/ML (ref 193–986)
WBC # BLD AUTO: 16.2 K/UL (ref 4.3–11.1)
WBC URNS QL MICRO: ABNORMAL /HPF

## 2024-12-12 PROCEDURE — 93005 ELECTROCARDIOGRAM TRACING: CPT | Performed by: GENERAL PRACTICE

## 2024-12-12 PROCEDURE — 96365 THER/PROPH/DIAG IV INF INIT: CPT

## 2024-12-12 PROCEDURE — 1100000000 HC RM PRIVATE

## 2024-12-12 PROCEDURE — 2580000003 HC RX 258: Performed by: GENERAL PRACTICE

## 2024-12-12 PROCEDURE — 82550 ASSAY OF CK (CPK): CPT

## 2024-12-12 PROCEDURE — 82607 VITAMIN B-12: CPT

## 2024-12-12 PROCEDURE — 99285 EMERGENCY DEPT VISIT HI MDM: CPT

## 2024-12-12 PROCEDURE — 96361 HYDRATE IV INFUSION ADD-ON: CPT

## 2024-12-12 PROCEDURE — 36415 COLL VENOUS BLD VENIPUNCTURE: CPT

## 2024-12-12 PROCEDURE — 6360000002 HC RX W HCPCS

## 2024-12-12 PROCEDURE — 84443 ASSAY THYROID STIM HORMONE: CPT

## 2024-12-12 PROCEDURE — 93010 ELECTROCARDIOGRAM REPORT: CPT | Performed by: INTERNAL MEDICINE

## 2024-12-12 PROCEDURE — 2580000003 HC RX 258

## 2024-12-12 PROCEDURE — 6370000000 HC RX 637 (ALT 250 FOR IP)

## 2024-12-12 PROCEDURE — 82140 ASSAY OF AMMONIA: CPT

## 2024-12-12 PROCEDURE — 85025 COMPLETE CBC W/AUTO DIFF WBC: CPT

## 2024-12-12 PROCEDURE — 71045 X-RAY EXAM CHEST 1 VIEW: CPT

## 2024-12-12 PROCEDURE — 83605 ASSAY OF LACTIC ACID: CPT

## 2024-12-12 PROCEDURE — 72170 X-RAY EXAM OF PELVIS: CPT

## 2024-12-12 PROCEDURE — 87040 BLOOD CULTURE FOR BACTERIA: CPT

## 2024-12-12 PROCEDURE — 80053 COMPREHEN METABOLIC PANEL: CPT

## 2024-12-12 PROCEDURE — 81001 URINALYSIS AUTO W/SCOPE: CPT

## 2024-12-12 PROCEDURE — 86780 TREPONEMA PALLIDUM: CPT

## 2024-12-12 PROCEDURE — 6360000002 HC RX W HCPCS: Performed by: GENERAL PRACTICE

## 2024-12-12 PROCEDURE — 82746 ASSAY OF FOLIC ACID SERUM: CPT

## 2024-12-12 RX ORDER — ENOXAPARIN SODIUM 100 MG/ML
30 INJECTION SUBCUTANEOUS EVERY 24 HOURS
Status: DISCONTINUED | OUTPATIENT
Start: 2024-12-12 | End: 2024-12-13 | Stop reason: HOSPADM

## 2024-12-12 RX ORDER — POTASSIUM CHLORIDE 1500 MG/1
40 TABLET, EXTENDED RELEASE ORAL PRN
Status: DISCONTINUED | OUTPATIENT
Start: 2024-12-12 | End: 2024-12-13 | Stop reason: HOSPADM

## 2024-12-12 RX ORDER — ALBUTEROL SULFATE 0.83 MG/ML
2.5 SOLUTION RESPIRATORY (INHALATION) EVERY 4 HOURS PRN
Status: DISCONTINUED | OUTPATIENT
Start: 2024-12-12 | End: 2024-12-13 | Stop reason: HOSPADM

## 2024-12-12 RX ORDER — SODIUM CHLORIDE 9 MG/ML
INJECTION, SOLUTION INTRAVENOUS PRN
Status: DISCONTINUED | OUTPATIENT
Start: 2024-12-12 | End: 2024-12-13 | Stop reason: HOSPADM

## 2024-12-12 RX ORDER — SODIUM CHLORIDE 9 MG/ML
INJECTION, SOLUTION INTRAVENOUS CONTINUOUS
Status: DISCONTINUED | OUTPATIENT
Start: 2024-12-12 | End: 2024-12-13 | Stop reason: HOSPADM

## 2024-12-12 RX ORDER — CETIRIZINE HYDROCHLORIDE 5 MG/1
5 TABLET ORAL DAILY
Status: DISCONTINUED | OUTPATIENT
Start: 2024-12-13 | End: 2024-12-13 | Stop reason: HOSPADM

## 2024-12-12 RX ORDER — ACETAMINOPHEN 650 MG/1
650 SUPPOSITORY RECTAL EVERY 6 HOURS PRN
Status: DISCONTINUED | OUTPATIENT
Start: 2024-12-12 | End: 2024-12-13 | Stop reason: HOSPADM

## 2024-12-12 RX ORDER — MAGNESIUM SULFATE IN WATER 40 MG/ML
2000 INJECTION, SOLUTION INTRAVENOUS PRN
Status: DISCONTINUED | OUTPATIENT
Start: 2024-12-12 | End: 2024-12-13 | Stop reason: HOSPADM

## 2024-12-12 RX ORDER — PANTOPRAZOLE SODIUM 40 MG/1
40 TABLET, DELAYED RELEASE ORAL
Status: DISCONTINUED | OUTPATIENT
Start: 2024-12-13 | End: 2024-12-13 | Stop reason: HOSPADM

## 2024-12-12 RX ORDER — FLUTICASONE PROPIONATE 50 MCG
2 SPRAY, SUSPENSION (ML) NASAL DAILY PRN
Status: DISCONTINUED | OUTPATIENT
Start: 2024-12-13 | End: 2024-12-13 | Stop reason: HOSPADM

## 2024-12-12 RX ORDER — POTASSIUM CHLORIDE 7.45 MG/ML
10 INJECTION INTRAVENOUS PRN
Status: DISCONTINUED | OUTPATIENT
Start: 2024-12-12 | End: 2024-12-13 | Stop reason: HOSPADM

## 2024-12-12 RX ORDER — LISINOPRIL 5 MG/1
10 TABLET ORAL DAILY
Status: DISCONTINUED | OUTPATIENT
Start: 2024-12-12 | End: 2024-12-13 | Stop reason: HOSPADM

## 2024-12-12 RX ORDER — AZITHROMYCIN 250 MG/1
500 TABLET, FILM COATED ORAL EVERY 24 HOURS
Status: DISCONTINUED | OUTPATIENT
Start: 2024-12-12 | End: 2024-12-13 | Stop reason: HOSPADM

## 2024-12-12 RX ORDER — ACETAMINOPHEN 325 MG/1
650 TABLET ORAL EVERY 6 HOURS PRN
Status: DISCONTINUED | OUTPATIENT
Start: 2024-12-12 | End: 2024-12-13 | Stop reason: HOSPADM

## 2024-12-12 RX ORDER — 0.9 % SODIUM CHLORIDE 0.9 %
1000 INTRAVENOUS SOLUTION INTRAVENOUS ONCE
Status: COMPLETED | OUTPATIENT
Start: 2024-12-12 | End: 2024-12-12

## 2024-12-12 RX ORDER — ASPIRIN 81 MG/1
81 TABLET ORAL DAILY
Status: DISCONTINUED | OUTPATIENT
Start: 2024-12-13 | End: 2024-12-13 | Stop reason: HOSPADM

## 2024-12-12 RX ORDER — POLYETHYLENE GLYCOL 3350 17 G/17G
17 POWDER, FOR SOLUTION ORAL DAILY PRN
Status: DISCONTINUED | OUTPATIENT
Start: 2024-12-12 | End: 2024-12-13

## 2024-12-12 RX ORDER — SODIUM CHLORIDE 0.9 % (FLUSH) 0.9 %
5-40 SYRINGE (ML) INJECTION EVERY 12 HOURS SCHEDULED
Status: DISCONTINUED | OUTPATIENT
Start: 2024-12-12 | End: 2024-12-13 | Stop reason: HOSPADM

## 2024-12-12 RX ORDER — ONDANSETRON 4 MG/1
4 TABLET, ORALLY DISINTEGRATING ORAL EVERY 8 HOURS PRN
Status: DISCONTINUED | OUTPATIENT
Start: 2024-12-12 | End: 2024-12-13 | Stop reason: HOSPADM

## 2024-12-12 RX ORDER — EZETIMIBE 10 MG/1
10 TABLET ORAL DAILY
Status: DISCONTINUED | OUTPATIENT
Start: 2024-12-13 | End: 2024-12-13 | Stop reason: HOSPADM

## 2024-12-12 RX ORDER — CHOLESTYRAMINE LIGHT 4 G/5.7G
1 POWDER, FOR SUSPENSION ORAL DAILY
Status: DISCONTINUED | OUTPATIENT
Start: 2024-12-13 | End: 2024-12-13 | Stop reason: HOSPADM

## 2024-12-12 RX ORDER — FAMOTIDINE 20 MG/1
20 TABLET, FILM COATED ORAL EVERY EVENING
Status: DISCONTINUED | OUTPATIENT
Start: 2024-12-13 | End: 2024-12-13 | Stop reason: HOSPADM

## 2024-12-12 RX ORDER — ONDANSETRON 2 MG/ML
4 INJECTION INTRAMUSCULAR; INTRAVENOUS EVERY 6 HOURS PRN
Status: DISCONTINUED | OUTPATIENT
Start: 2024-12-12 | End: 2024-12-13 | Stop reason: HOSPADM

## 2024-12-12 RX ORDER — SODIUM CHLORIDE 0.9 % (FLUSH) 0.9 %
5-40 SYRINGE (ML) INJECTION PRN
Status: DISCONTINUED | OUTPATIENT
Start: 2024-12-12 | End: 2024-12-13 | Stop reason: HOSPADM

## 2024-12-12 RX ORDER — BENZONATATE 100 MG/1
100 CAPSULE ORAL 3 TIMES DAILY PRN
Status: DISCONTINUED | OUTPATIENT
Start: 2024-12-12 | End: 2024-12-13 | Stop reason: HOSPADM

## 2024-12-12 RX ADMIN — PIPERACILLIN AND TAZOBACTAM 4500 MG: 4; .5 INJECTION, POWDER, FOR SOLUTION INTRAVENOUS at 10:17

## 2024-12-12 RX ADMIN — ENOXAPARIN SODIUM 30 MG: 100 INJECTION SUBCUTANEOUS at 22:33

## 2024-12-12 RX ADMIN — AZITHROMYCIN DIHYDRATE 500 MG: 250 TABLET ORAL at 22:33

## 2024-12-12 RX ADMIN — CEFTRIAXONE 1000 MG: 1 INJECTION, POWDER, FOR SOLUTION INTRAMUSCULAR; INTRAVENOUS at 22:33

## 2024-12-12 RX ADMIN — SODIUM CHLORIDE 1000 ML: 9 INJECTION, SOLUTION INTRAVENOUS at 10:43

## 2024-12-12 RX ADMIN — SODIUM CHLORIDE: 9 INJECTION, SOLUTION INTRAVENOUS at 22:34

## 2024-12-12 RX ADMIN — SODIUM CHLORIDE: 9 INJECTION, SOLUTION INTRAVENOUS at 13:21

## 2024-12-12 ASSESSMENT — PAIN - FUNCTIONAL ASSESSMENT: PAIN_FUNCTIONAL_ASSESSMENT: NONE - DENIES PAIN

## 2024-12-12 NOTE — CARE COORDINATION
Patient is from home, lives in independent living at [facility] with her . Daughter America lui and supportive. Patient is insured with primary care. She uses a walker to ambulate, is independent in her ADLs at baseline, and fell today. Patient require 3L O2 baseline, daughter unsure of DME company. Patient has had a sharp decline in mobility and independence.     Upon arrival to the ER, patient's daughter asking about hospice. NIA advised she could provide a hospice directory but hospice referrals are usually sent from the patient's PCP as the MD has to sign an attestation stating that the patient has 6 months or less to live and most of the ER doctors do not have adequate information to make that judgement in the short amount of time they treat the patient.     Patient's daughter later told her RN that the patient had a bed at [facility] and needs NIA to send over her mother's medical records. NIA called Ashu with [facility] to clarify. Ashu states that the patient lives in independent living and wants to go from STR to LTC. NIA unsure at this time if the patient is going to admit.     NIA met with the patient's daughter, explained that for a patient to qualify for STR they must be admitted for a medical reason, evaluated by therapy who recommends STR, then accepted to the facility of their choosing, and then the patient's insurance has to authorize rehab through pre-certification. Patient's daughter then asked about LTC with hospice services and what [facility] could offer. NIA provided generalized information with the understanding that NIA does not work for [facility] and these questions are better answered by Blanca or Ashu. Patient's daughter then told NIA that Blanca told her to bring the patient to the ER to be evaluated and transferred to LTC from the ER. NIA advised if medically cleared for discharge, the patient would need to return home and work on LTC placement from her apartment with the

## 2024-12-12 NOTE — ED PROVIDER NOTES
Emergency Department Provider Note       PCP: Marshall Linder, APRN - NP   Age: 89 y.o.   Sex: female     DISPOSITION Admitted 12/12/2024 12:22:02 PM                ICD-10-CM    1. Acute pneumonia  J18.9       2. Metabolic encephalopathy  G93.41       3. Septicemia (HCC)  A41.9           Medical Decision Making     Patient presents with altered mental status, weakness, and increased respiratory distress.  Patient was found to have worsening of chest x-ray concerning for pneumonia.  Patient also has leukocytosis and lactic acidosis.  Possibly severe sepsis.  Did not give full amount of fluids due to chest x-ray findings and respiratory distress concerning for possible edema.  Patient started on broad-spectrum antibiotics.  Patient will be admitted for further workup and management.     1 or more acute illnesses that pose a threat to life or bodily function.   Drug therapy given requiring intensive monitoring for toxicity.  Discussion with external consultants.  Chronic medical problems impacting care include bronchiectasis.  Shared medical decision making was utilized in creating the patients health plan today.    I independently ordered and reviewed each unique test.  I reviewed external records: provider visit note from outside specialist.  I reviewed external records: previous lab results from outside ED.  I reviewed external records: previous imaging study including radiologist interpretation.     I interpreted the X-rays chest x-ray shows bilateral worsening infiltrates compared to previous.  No obvious edema.  Normal heart size.  I have reviewed and agree with radiology report.  My Independent EKG Interpretation: sinus rhythm, no evidence of arrhythmia      ST Segments:Normal ST segments - NO STEMI   Rate: 111  The patient was admitted and I have discussed patient management with the admitting provider.    SEP-1 CORE MEASURE    Fluid Resuscitation Rational: less than 30ml/kg because concern for possible  Diagnosis       Sinus tachycardia  Ventricular premature complex  Poor R wave progression    Confirmed by MD VIKI (), KAYLEN ([id number]) on 12/12/2024 11:29:43 AM           XR CHEST PORTABLE   Final Result   Findings/impression: Utilizing the above-mentioned prior exams as a baseline   interval development of ill-defined airspace opacities predominantly of the   perihilar regions and left lower lung. Recommend dedicated PA/lateral chest when   clinically feasible.      Electronically signed by Genaro Bryant      XR PELVIS (1-2 VIEWS)   Final Result   Findings/impression: Chronic fractures of the right pubic rami. No definite   acute finding on this limited portable exam. Degenerative changes of the hips.   If clinical concern remains particularly if the patient is unable to bear weight   CT hip/pelvis may be useful.      Electronically signed by Genaro Bryant                   No results for input(s): \"COVID19\" in the last 72 hours.    Voice dictation software was used during the making of this note.  This software is not perfect and grammatical and other typographical errors may be present.  This note has not been completely proofread for errors.     Bryan Dolan,   12/12/24 1508

## 2024-12-12 NOTE — ACP (ADVANCE CARE PLANNING)
Advance Care Planning Note   Admit Date:  2024  8:45 AM   Name:  Sobeida Ugarte   Age:  89 y.o.  Sex:  female  :  1931   MRN:  [id number]   Room:      Sobeida Ugarte has capacity to make her own decisions:   No    If pt unable to make decisions, POA/surrogate decision maker:  Spouse and daughter    Other people present:   Spouse    Patient / surrogate decision-maker directed code status:  DNR/DNI    Other ACP topics discussed, if applicable:   Discussed possibility of hospice or comfort measures.      Will workup reversible causes of patient's clinical presentation but likely requires hospice.  Daughter who is bedside and is agreeable to this and does not want aggressive/unnecessary interventions    Patient or surrogate consented to discussion of the current conditions, workup, management plans, prognosis, and the risk for further deterioration.  Time spent: 20 minutes in direct discussion.      Signed:  Aaron Murguia MD

## 2024-12-12 NOTE — ACP (ADVANCE CARE PLANNING)
Advance Care Planning     Advance Care Planning Activator (Inpatient)  Conversation Note      Date of ACP Conversation: 12/12/2024     Conversation Conducted with: Patient with Decision Making Capacity and Healthcare Decision Maker    ACP Activator: Nenita Christine MSW      Health Care Decision Maker:     Current Designated Health Care Decision Maker:     Primary Decision Maker: America Perry Jo - Child - [phone]  Click here to complete Healthcare Decision Makers including section of the Healthcare Decision Maker Relationship (ie \"Primary\")  Today we documented Decision Maker(s) consistent with ACP documents on file.

## 2024-12-12 NOTE — ED TRIAGE NOTES
Per ems called for unwitnessed fall this am. States of r hip pain. Notably delayed responses which has been normal over the last 2 days. States of rapid decline. On 3l via nc. Dry cough that has been gong on \"for a while\". Denies n/v/d. Ems states family interest in hospice care.

## 2024-12-12 NOTE — PLAN OF CARE
Problem: Discharge Planning  Goal: Discharge to home or other facility with appropriate resources  Outcome: Progressing  Flowsheets (Taken 12/12/2024 1816)  Discharge to home or other facility with appropriate resources: Identify barriers to discharge with patient and caregiver

## 2024-12-12 NOTE — PROGRESS NOTES
TRANSFER - IN REPORT:    Verbal report received from ILDEOFNSO Aaron on Sobeida Ugarte  being received from ED for routine progression of patient care      Report consisted of patient's Situation, Background, Assessment and   Recommendations(SBAR).     Information from the following report(s) ED SBAR was reviewed with the receiving nurse.    Opportunity for questions and clarification was provided.      Assessment completed upon patient's arrival to unit and care assumed.

## 2024-12-13 ENCOUNTER — APPOINTMENT (OUTPATIENT)
Dept: CT IMAGING | Age: 88
End: 2024-12-13
Payer: MEDICARE

## 2024-12-13 VITALS
HEART RATE: 63 BPM | DIASTOLIC BLOOD PRESSURE: 81 MMHG | TEMPERATURE: 97.7 F | WEIGHT: 106.4 LBS | OXYGEN SATURATION: 100 % | RESPIRATION RATE: 20 BRPM | SYSTOLIC BLOOD PRESSURE: 122 MMHG | BODY MASS INDEX: 19.58 KG/M2 | HEIGHT: 62 IN

## 2024-12-13 PROBLEM — Z66 DNR (DO NOT RESUSCITATE): Status: ACTIVE | Noted: 2024-12-13

## 2024-12-13 LAB
ANION GAP SERPL CALC-SCNC: 20 MMOL/L (ref 7–16)
BUN SERPL-MCNC: 23 MG/DL (ref 8–23)
CALCIUM SERPL-MCNC: 8.5 MG/DL (ref 8.8–10.2)
CHLORIDE SERPL-SCNC: 97 MMOL/L (ref 98–107)
CO2 SERPL-SCNC: 15 MMOL/L (ref 20–29)
CREAT SERPL-MCNC: 0.83 MG/DL (ref 0.6–1.1)
ERYTHROCYTE [DISTWIDTH] IN BLOOD BY AUTOMATED COUNT: 14.6 % (ref 11.9–14.6)
GLUCOSE SERPL-MCNC: 91 MG/DL (ref 70–99)
HCT VFR BLD AUTO: 39.7 % (ref 35.8–46.3)
HGB BLD-MCNC: 13.2 G/DL (ref 11.7–15.4)
LACTATE SERPL-SCNC: 14.1 MMOL/L (ref 0.5–2)
LACTATE SERPL-SCNC: 6.7 MMOL/L (ref 0.5–2)
MCH RBC QN AUTO: 31.7 PG (ref 26.1–32.9)
MCHC RBC AUTO-ENTMCNC: 33.2 G/DL (ref 31.4–35)
MCV RBC AUTO: 95.4 FL (ref 82–102)
NRBC # BLD: 0 K/UL (ref 0–0.2)
PLATELET # BLD AUTO: 236 K/UL (ref 150–450)
PMV BLD AUTO: 9.4 FL (ref 9.4–12.3)
POTASSIUM SERPL-SCNC: 4.6 MMOL/L (ref 3.5–5.1)
RBC # BLD AUTO: 4.16 M/UL (ref 4.05–5.2)
SODIUM SERPL-SCNC: 132 MMOL/L (ref 136–145)
WBC # BLD AUTO: 17.2 K/UL (ref 4.3–11.1)

## 2024-12-13 PROCEDURE — 74177 CT ABD & PELVIS W/CONTRAST: CPT

## 2024-12-13 PROCEDURE — 51701 INSERT BLADDER CATHETER: CPT

## 2024-12-13 PROCEDURE — 51798 US URINE CAPACITY MEASURE: CPT

## 2024-12-13 PROCEDURE — 6370000000 HC RX 637 (ALT 250 FOR IP)

## 2024-12-13 PROCEDURE — 2580000003 HC RX 258: Performed by: FAMILY MEDICINE

## 2024-12-13 PROCEDURE — 6360000004 HC RX CONTRAST MEDICATION

## 2024-12-13 PROCEDURE — 85027 COMPLETE CBC AUTOMATED: CPT

## 2024-12-13 PROCEDURE — 83605 ASSAY OF LACTIC ACID: CPT

## 2024-12-13 PROCEDURE — 80048 BASIC METABOLIC PNL TOTAL CA: CPT

## 2024-12-13 PROCEDURE — 36415 COLL VENOUS BLD VENIPUNCTURE: CPT

## 2024-12-13 RX ORDER — SODIUM CHLORIDE, SODIUM LACTATE, POTASSIUM CHLORIDE, AND CALCIUM CHLORIDE .6; .31; .03; .02 G/100ML; G/100ML; G/100ML; G/100ML
500 INJECTION, SOLUTION INTRAVENOUS ONCE
Status: COMPLETED | OUTPATIENT
Start: 2024-12-13 | End: 2024-12-13

## 2024-12-13 RX ORDER — POLYETHYLENE GLYCOL 3350 17 G/17G
17 POWDER, FOR SOLUTION ORAL 2 TIMES DAILY
Status: DISCONTINUED | OUTPATIENT
Start: 2024-12-13 | End: 2024-12-13 | Stop reason: HOSPADM

## 2024-12-13 RX ORDER — SENNOSIDES A AND B 8.6 MG/1
1 TABLET, FILM COATED ORAL 2 TIMES DAILY
Status: DISCONTINUED | OUTPATIENT
Start: 2024-12-13 | End: 2024-12-13 | Stop reason: HOSPADM

## 2024-12-13 RX ORDER — IOPAMIDOL 755 MG/ML
100 INJECTION, SOLUTION INTRAVASCULAR
Status: COMPLETED | OUTPATIENT
Start: 2024-12-13 | End: 2024-12-13

## 2024-12-13 RX ADMIN — PANTOPRAZOLE SODIUM 40 MG: 40 TABLET, DELAYED RELEASE ORAL at 06:38

## 2024-12-13 RX ADMIN — SODIUM CHLORIDE, POTASSIUM CHLORIDE, SODIUM LACTATE AND CALCIUM CHLORIDE 500 ML: 600; 310; 30; 20 INJECTION, SOLUTION INTRAVENOUS at 05:44

## 2024-12-13 RX ADMIN — IOPAMIDOL 100 ML: 755 INJECTION, SOLUTION INTRAVENOUS at 05:43

## 2024-12-13 ASSESSMENT — ENCOUNTER SYMPTOMS
RECTAL PAIN: 0
DIARRHEA: 0
ANAL BLEEDING: 0
SHORTNESS OF BREATH: 0
EYE PAIN: 0
APNEA: 0
CONSTIPATION: 0
WHEEZING: 0
VOICE CHANGE: 0
NAUSEA: 0
BACK PAIN: 1
EYES NEGATIVE: 1
STRIDOR: 0
VOMITING: 0
CHOKING: 0
EYE DISCHARGE: 0
FACIAL SWELLING: 0
RHINORRHEA: 0
SINUS PAIN: 0
COUGH: 1
ABDOMINAL PAIN: 0
TROUBLE SWALLOWING: 0
BLOOD IN STOOL: 0
CHEST TIGHTNESS: 0
SINUS PRESSURE: 0
COLOR CHANGE: 0
SORE THROAT: 0
ABDOMINAL DISTENTION: 0

## 2024-12-13 NOTE — DISCHARGE SUMMARY
Hospitalist Discharge Summary   Admit Date:  2024  8:45 AM   DC Note date: 2024  Name:  Sobeida Ugarte   Age:  89 y.o.  Sex:  female  :  1931   MRN:  [id number]   Room:  [facility]  PCP:  Marshall Linder APRN - NP    Presenting Complaint: Fall     Initial Admission Diagnosis: Metabolic encephalopathy [G93.41]  Septicemia (HCC) [A41.9]  Acute pneumonia [J18.9]  AMS (altered mental status) [R41.82]     Problem List for this Hospitalization (present on admission):    Principal Problem:    AMS (altered mental status)  Active Problems:    Primary hypertension    Hyperlipidemia    Hearing loss    GERD (gastroesophageal reflux disease)    CAD (coronary artery disease)    Persistent cough for 3 weeks or longer    Memory loss    Bronchiectasis without complication (HCC)    Hyponatremia    DNR (do not resuscitate)  Resolved Problems:    * No resolved hospital problems. *      Hospital Course:  Sobeida Ugarte is a 89 y.o. female with medical history of chronic lung disease/interstitial lung disease, bronchiectasis, GERD, hypertension, CKD, hyperlipidemia, coronary artery disease, memory loss, vitamin D deficiency, osteoarthritis who presents today after being found down by her spouse at home.  Daughter is bedside who provided most of the history.     Of note, patient was recently seen in pulmonology clinic on 10/25.  At that time was complaining of shortness of breath and chronic cough.  Chest x-ray and CT scan showed traction bronchiectasis with significant infiltrates bilaterally along with honeycombing.  Being worked up for chronic infection such as UIP/NSIP and considering BAL.  Based on discussions with daughter, this was likely to not be pursued given patient's DNR/DNI status and concern for prolonged antibiotic/side effects.  Family was considering hospice care around this time.     Patient recently saw PCP on , and was having baseline cough/shortness of breath.  Yesterday

## 2024-12-13 NOTE — PLAN OF CARE
Problem: Discharge Planning  Goal: Discharge to home or other facility with appropriate resources  12/13/2024 0108 by Brittany Dueñas, RN  Outcome: Progressing  12/12/2024 1816 by Yosef Barnes, RN  Outcome: Progressing  Flowsheets (Taken 12/12/2024 1816)  Discharge to home or other facility with appropriate resources: Identify barriers to discharge with patient and caregiver     Problem: Safety - Adult  Goal: Free from fall injury  Outcome: Progressing

## 2024-12-13 NOTE — PROGRESS NOTES
[facility] received notification PT had passed away. [facility] offered traditional Holiness prayers including the Commendation of the Body.     Rev. ANTONIO Castelan.Div.

## 2024-12-13 NOTE — PROGRESS NOTES
Called to bedside due to concern the patient passed away    On my evaluation, absent spontaneous movements.  Eyes infix position.  No spontaneous breath sounds or lung sounds.  No palpable carotid pulses.  Patient unresponsive.    Time of death declared at 0800 on 12/13

## 2024-12-13 NOTE — PROGRESS NOTES
Checked on patient this morning around 0720, hallucinating and confused.  Check on patient again at 0750, ,patient not breathing. Attempted to arouse patient but not arousable. Listened for heart sounds, not heard. Doctor at bedside shortly after that.

## 2024-12-15 LAB
BACTERIA SPEC CULT: NORMAL
BACTERIA SPEC CULT: NORMAL
SERVICE CMNT-IMP: NORMAL
SERVICE CMNT-IMP: NORMAL
